# Patient Record
Sex: FEMALE | Race: WHITE | Employment: OTHER | URBAN - METROPOLITAN AREA
[De-identification: names, ages, dates, MRNs, and addresses within clinical notes are randomized per-mention and may not be internally consistent; named-entity substitution may affect disease eponyms.]

---

## 2017-03-13 ENCOUNTER — TELEPHONE (OUTPATIENT)
Dept: FAMILY MEDICINE CLINIC | Age: 62
End: 2017-03-13

## 2017-06-22 ENCOUNTER — OFFICE VISIT (OUTPATIENT)
Dept: FAMILY MEDICINE CLINIC | Age: 62
End: 2017-06-22

## 2017-06-22 VITALS
WEIGHT: 233 LBS | TEMPERATURE: 97.9 F | HEART RATE: 60 BPM | DIASTOLIC BLOOD PRESSURE: 86 MMHG | RESPIRATION RATE: 16 BRPM | BODY MASS INDEX: 42.62 KG/M2 | SYSTOLIC BLOOD PRESSURE: 142 MMHG

## 2017-06-22 DIAGNOSIS — Z87.81 S/P ORIF (OPEN REDUCTION INTERNAL FIXATION) FRACTURE: ICD-10-CM

## 2017-06-22 DIAGNOSIS — Z98.890 S/P ORIF (OPEN REDUCTION INTERNAL FIXATION) FRACTURE: ICD-10-CM

## 2017-06-22 DIAGNOSIS — M53.3 SACROILIAC JOINT PAIN: Primary | ICD-10-CM

## 2017-06-22 DIAGNOSIS — Z72.0 TOBACCO ABUSE: ICD-10-CM

## 2017-06-22 DIAGNOSIS — I10 ESSENTIAL HYPERTENSION: ICD-10-CM

## 2017-06-22 DIAGNOSIS — E66.01 MORBID (SEVERE) OBESITY DUE TO EXCESS CALORIES (HCC): ICD-10-CM

## 2017-06-22 PROCEDURE — 99214 OFFICE O/P EST MOD 30 MIN: CPT | Performed by: NURSE PRACTITIONER

## 2017-06-22 RX ORDER — NAPROXEN 250 MG/1
250 TABLET ORAL 2 TIMES DAILY WITH MEALS
Qty: 60 TABLET | Refills: 0 | Status: SHIPPED | OUTPATIENT
Start: 2017-06-22 | End: 2017-08-15 | Stop reason: SDUPTHER

## 2017-06-22 ASSESSMENT — ENCOUNTER SYMPTOMS
BACK PAIN: 1
ABDOMINAL PAIN: 0
COUGH: 1
APNEA: 0
ABDOMINAL DISTENTION: 0
TROUBLE SWALLOWING: 0
NAUSEA: 0
BLOOD IN STOOL: 0
ANAL BLEEDING: 0
SHORTNESS OF BREATH: 0
CONSTIPATION: 1
VOICE CHANGE: 0
DIARRHEA: 0
VOMITING: 0
WHEEZING: 1
PHOTOPHOBIA: 0

## 2017-08-15 DIAGNOSIS — J20.9 ACUTE BRONCHITIS, UNSPECIFIED ORGANISM: ICD-10-CM

## 2017-08-15 DIAGNOSIS — M53.3 SACROILIAC JOINT PAIN: ICD-10-CM

## 2017-08-15 RX ORDER — NAPROXEN 250 MG/1
250 TABLET ORAL 2 TIMES DAILY PRN
Qty: 60 TABLET | Refills: 0 | Status: SHIPPED | OUTPATIENT
Start: 2017-08-15 | End: 2017-10-17 | Stop reason: SDUPTHER

## 2017-08-16 RX ORDER — ALBUTEROL SULFATE 90 UG/1
1 AEROSOL, METERED RESPIRATORY (INHALATION) EVERY 6 HOURS PRN
Qty: 1 INHALER | Refills: 2 | Status: SHIPPED | OUTPATIENT
Start: 2017-08-16 | End: 2018-01-16 | Stop reason: SDUPTHER

## 2017-08-28 ENCOUNTER — HOSPITAL ENCOUNTER (OUTPATIENT)
Dept: WOMENS IMAGING | Age: 62
Discharge: HOME | End: 2017-08-28
Payer: COMMERCIAL

## 2017-08-28 DIAGNOSIS — Z12.31 VISIT FOR SCREENING MAMMOGRAM: ICD-10-CM

## 2017-08-28 PROCEDURE — G0202 SCR MAMMO BI INCL CAD: HCPCS

## 2017-10-17 ENCOUNTER — OFFICE VISIT (OUTPATIENT)
Dept: FAMILY MEDICINE CLINIC | Age: 62
End: 2017-10-17
Payer: COMMERCIAL

## 2017-10-17 VITALS
WEIGHT: 241 LBS | RESPIRATION RATE: 16 BRPM | HEIGHT: 62 IN | BODY MASS INDEX: 44.35 KG/M2 | DIASTOLIC BLOOD PRESSURE: 78 MMHG | HEART RATE: 60 BPM | TEMPERATURE: 98.1 F | SYSTOLIC BLOOD PRESSURE: 120 MMHG

## 2017-10-17 DIAGNOSIS — Z00.00 ANNUAL PHYSICAL EXAM: Primary | ICD-10-CM

## 2017-10-17 DIAGNOSIS — H61.21 EXCESSIVE CERUMEN IN EAR CANAL, RIGHT: ICD-10-CM

## 2017-10-17 DIAGNOSIS — M17.0 PRIMARY OSTEOARTHRITIS OF BOTH KNEES: ICD-10-CM

## 2017-10-17 DIAGNOSIS — Z72.0 TOBACCO ABUSE: ICD-10-CM

## 2017-10-17 DIAGNOSIS — R63.5 WEIGHT GAIN: ICD-10-CM

## 2017-10-17 PROCEDURE — 99396 PREV VISIT EST AGE 40-64: CPT | Performed by: FAMILY MEDICINE

## 2017-10-17 RX ORDER — NAPROXEN 250 MG/1
250 TABLET ORAL 2 TIMES DAILY PRN
Qty: 60 TABLET | Refills: 1 | Status: SHIPPED | OUTPATIENT
Start: 2017-10-17 | End: 2018-07-11 | Stop reason: SDUPTHER

## 2017-10-17 RX ORDER — NAPROXEN 250 MG/1
250 TABLET ORAL 2 TIMES DAILY PRN
COMMUNITY
End: 2017-10-17 | Stop reason: SDUPTHER

## 2017-10-17 ASSESSMENT — PATIENT HEALTH QUESTIONNAIRE - PHQ9
SUM OF ALL RESPONSES TO PHQ QUESTIONS 1-9: 1
1. LITTLE INTEREST OR PLEASURE IN DOING THINGS: 0
2. FEELING DOWN, DEPRESSED OR HOPELESS: 1
SUM OF ALL RESPONSES TO PHQ9 QUESTIONS 1 & 2: 1

## 2017-10-18 ENCOUNTER — HOSPITAL ENCOUNTER (OUTPATIENT)
Age: 62
Discharge: HOME | End: 2017-10-18
Payer: COMMERCIAL

## 2017-10-18 DIAGNOSIS — R63.5 WEIGHT GAIN: ICD-10-CM

## 2017-10-18 DIAGNOSIS — Z00.00 ANNUAL PHYSICAL EXAM: ICD-10-CM

## 2017-10-18 LAB
ALBUMIN SERPL-MCNC: 3.9 G/DL (ref 3.5–5.1)
ALP BLD-CCNC: 95 U/L (ref 38–126)
ALT SERPL-CCNC: 6 U/L (ref 11–66)
ANION GAP SERPL CALCULATED.3IONS-SCNC: 11 MEQ/L (ref 8–16)
AST SERPL-CCNC: 16 U/L (ref 5–40)
BILIRUB SERPL-MCNC: 0.3 MG/DL (ref 0.3–1.2)
BUN BLDV-MCNC: 11 MG/DL (ref 7–22)
CALCIUM SERPL-MCNC: 8.8 MG/DL (ref 8.5–10.5)
CHLORIDE BLD-SCNC: 103 MEQ/L (ref 98–111)
CHOLESTEROL, TOTAL: 149 MG/DL (ref 100–199)
CO2: 31 MEQ/L (ref 23–33)
CREAT SERPL-MCNC: 0.5 MG/DL (ref 0.4–1.2)
GFR SERPL CREATININE-BSD FRML MDRD: > 90 ML/MIN/1.73M2
GLUCOSE BLD-MCNC: 93 MG/DL (ref 70–108)
HDLC SERPL-MCNC: 49 MG/DL
LDL CHOLESTEROL CALCULATED: 79 MG/DL
POTASSIUM SERPL-SCNC: 4.7 MEQ/L (ref 3.5–5.2)
SODIUM BLD-SCNC: 145 MEQ/L (ref 135–145)
TOTAL PROTEIN: 7 G/DL (ref 6.1–8)
TRIGL SERPL-MCNC: 106 MG/DL (ref 0–199)
TSH SERPL DL<=0.05 MIU/L-ACNC: 1.77 UIU/ML (ref 0.4–4.2)

## 2017-10-18 PROCEDURE — 80061 LIPID PANEL: CPT

## 2017-10-18 PROCEDURE — 84443 ASSAY THYROID STIM HORMONE: CPT

## 2017-10-18 PROCEDURE — 80053 COMPREHEN METABOLIC PANEL: CPT

## 2017-10-18 PROCEDURE — 36415 COLL VENOUS BLD VENIPUNCTURE: CPT

## 2017-10-18 ASSESSMENT — ENCOUNTER SYMPTOMS
SHORTNESS OF BREATH: 0
ABDOMINAL PAIN: 0
NAUSEA: 0
VOMITING: 0
EYES NEGATIVE: 1
DIARRHEA: 0
BLOOD IN STOOL: 0

## 2017-10-18 NOTE — PROGRESS NOTES
Chief Complaint   Patient presents with   Christopher Wilhelm Check-Up     Patient requesting general check up. Requesting lab orders.  Medication Refill     Order pended. Bhavesh Benjamin is a 57 y. o.female    Pt presents for annual wellness physical exam.        Pt stable since last visit- no new problems for diagnoses listed below:  Patient Active Problem List   Diagnosis    Tobacco abuse    Class 3 obesity due to excess calories without serious comorbidity with body mass index (BMI) of 40.0 to 44.9 in adult [facility])     No complaints today except for some right ear discomfort with decreased hearing. Due for labs and screenings. Still smoking. Has cut back to 1/2 ppd. Not exercising due to knee issues and arthritis. No changes in family history. Declines flu shot. Will be due for colonoscopy in the spring. Body mass index is 44.08 kg/m². Review of Systems   Constitutional: Positive for unexpected weight change (gain). Negative for chills, fatigue and fever. HENT: Negative. Eyes: Negative. Respiratory: Negative for shortness of breath. Cardiovascular: Negative for chest pain, palpitations and leg swelling. Gastrointestinal: Negative for abdominal pain, blood in stool, diarrhea, nausea and vomiting. Genitourinary: Negative. Musculoskeletal: Positive for arthralgias. Negative for myalgias. Skin: Negative for rash. Neurological: Negative for dizziness and headaches. Hematological: Negative. Psychiatric/Behavioral: Negative. All other systems reviewed and are negative.           OBJECTIVE     /78 (Site: Left Arm, Position: Sitting, Cuff Size: Large Adult)   Pulse 60   Temp 98.1 °F (36.7 °C) (Oral)   Resp 16   Ht 5' 2\" (1.575 m)   Wt 241 lb (109.3 kg)   BMI 44.08 kg/m²     Wt Readings from Last 3 Encounters:   10/17/17 241 lb (109.3 kg)   06/22/17 233 lb (105.7 kg)   06/29/16 232 lb (105.2 kg)     BP Readings from Last 3 Encounters:   10/17/17 120/78

## 2018-01-15 ENCOUNTER — TELEPHONE (OUTPATIENT)
Dept: FAMILY MEDICINE CLINIC | Age: 63
End: 2018-01-15

## 2018-01-16 ENCOUNTER — OFFICE VISIT (OUTPATIENT)
Dept: FAMILY MEDICINE CLINIC | Age: 63
End: 2018-01-16
Payer: COMMERCIAL

## 2018-01-16 VITALS
RESPIRATION RATE: 20 BRPM | WEIGHT: 247.2 LBS | BODY MASS INDEX: 45.21 KG/M2 | SYSTOLIC BLOOD PRESSURE: 132 MMHG | HEART RATE: 64 BPM | DIASTOLIC BLOOD PRESSURE: 84 MMHG | TEMPERATURE: 97.8 F

## 2018-01-16 DIAGNOSIS — J01.91 ACUTE RECURRENT SINUSITIS, UNSPECIFIED LOCATION: Primary | ICD-10-CM

## 2018-01-16 PROCEDURE — 99213 OFFICE O/P EST LOW 20 MIN: CPT | Performed by: FAMILY MEDICINE

## 2018-01-16 RX ORDER — CEFUROXIME AXETIL 250 MG/1
250 TABLET ORAL 2 TIMES DAILY
Qty: 20 TABLET | Refills: 0 | Status: SHIPPED | OUTPATIENT
Start: 2018-01-16 | End: 2018-01-26

## 2018-01-16 RX ORDER — ALBUTEROL SULFATE 90 UG/1
1 AEROSOL, METERED RESPIRATORY (INHALATION) EVERY 6 HOURS PRN
Qty: 1 INHALER | Refills: 2 | Status: SHIPPED | OUTPATIENT
Start: 2018-01-16 | End: 2018-05-18

## 2018-01-16 ASSESSMENT — ENCOUNTER SYMPTOMS
NAUSEA: 0
SORE THROAT: 1
RHINORRHEA: 1
VOMITING: 0
SINUS PRESSURE: 1
SINUS PAIN: 1
COUGH: 1

## 2018-01-16 NOTE — PROGRESS NOTES
Chief Complaint   Patient presents with    Sinusitis     Sinus pressure, productive cough, and congestion for the past few weeks but pressure worsening over the past few days. Not using anything OTC for symptoms. Yumiko Mahoney is a 57 y. o.female      Pt complains of a 2 week h/o sinus pressure, cough and congestion. Symptoms worse over the last 3-4 days. Not using any OTC meds. Smoker of 1/2 ppd. Declines smoking cessation aids. No fever or body aches. Denies wheezing. Using her albuterol inhaler at night. Review of Systems   Constitutional: Positive for fatigue. Negative for fever. HENT: Positive for congestion, postnasal drip, rhinorrhea, sinus pain, sinus pressure and sore throat. Negative for ear pain. Respiratory: Positive for cough. Cardiovascular: Negative. Gastrointestinal: Negative for nausea and vomiting. Musculoskeletal: Negative for myalgias. Neurological: Negative for headaches. All other systems reviewed and are negative. OBJECTIVE     /84 (Site: Left Arm, Position: Sitting, Cuff Size: Large Adult)   Pulse 64   Temp 97.8 °F (36.6 °C) (Oral)   Resp 20   Wt 247 lb 3.2 oz (112.1 kg)   BMI 45.21 kg/m²     Physical Exam   Constitutional: She appears well-developed and well-nourished. HENT:   Right Ear: Tympanic membrane normal.   Left Ear: Tympanic membrane normal.   Nose: Mucosal edema present. Right sinus exhibits maxillary sinus tenderness and frontal sinus tenderness. Left sinus exhibits maxillary sinus tenderness and frontal sinus tenderness. Mouth/Throat: Oropharynx is clear and moist.   Cardiovascular: Normal rate and regular rhythm. No murmur heard. Pulmonary/Chest: Breath sounds normal. She has no wheezes. Musculoskeletal: She exhibits no edema. Lymphadenopathy:     She has no cervical adenopathy. ASSESSMENT      1.  Acute recurrent sinusitis, unspecified location        PLAN     Requested Prescriptions Signed Prescriptions Disp Refills    albuterol sulfate HFA (PROAIR HFA) 108 (90 Base) MCG/ACT inhaler 1 Inhaler 2     Sig: Inhale 1 puff into the lungs every 6 hours as needed for Wheezing or Shortness of Breath    cefUROXime (CEFTIN) 250 MG tablet 20 tablet 0     Sig: Take 1 tablet by mouth 2 times daily for 10 days       Trial of nasal saline rinses for 24-48 hours. If not improved, start ceftin as above for sinuses.     Refill of albuterol given to use prn wheezing      Follow up if not better            Electronically signed by Alcira Richardson MD on 1/16/2018 at 4:48 PM

## 2018-02-02 ENCOUNTER — OFFICE VISIT (OUTPATIENT)
Dept: FAMILY MEDICINE CLINIC | Age: 63
End: 2018-02-02
Payer: COMMERCIAL

## 2018-02-02 VITALS
BODY MASS INDEX: 44.77 KG/M2 | SYSTOLIC BLOOD PRESSURE: 116 MMHG | TEMPERATURE: 97.8 F | OXYGEN SATURATION: 91 % | WEIGHT: 243.3 LBS | HEIGHT: 62 IN | RESPIRATION RATE: 22 BRPM | HEART RATE: 69 BPM | DIASTOLIC BLOOD PRESSURE: 70 MMHG

## 2018-02-02 DIAGNOSIS — J20.9 ACUTE BRONCHITIS DUE TO INFECTION: Primary | ICD-10-CM

## 2018-02-02 DIAGNOSIS — R05.9 COUGH: ICD-10-CM

## 2018-02-02 DIAGNOSIS — R06.02 SOB (SHORTNESS OF BREATH): ICD-10-CM

## 2018-02-02 PROCEDURE — 96372 THER/PROPH/DIAG INJ SC/IM: CPT | Performed by: NURSE PRACTITIONER

## 2018-02-02 PROCEDURE — 99213 OFFICE O/P EST LOW 20 MIN: CPT | Performed by: NURSE PRACTITIONER

## 2018-02-02 RX ORDER — ALBUTEROL SULFATE 2.5 MG/3ML
2.5 SOLUTION RESPIRATORY (INHALATION) ONCE
Status: COMPLETED | OUTPATIENT
Start: 2018-02-02 | End: 2018-02-02

## 2018-02-02 RX ORDER — AZITHROMYCIN 250 MG/1
TABLET, FILM COATED ORAL
Qty: 6 TABLET | Refills: 0 | Status: SHIPPED | OUTPATIENT
Start: 2018-02-02 | End: 2018-05-18

## 2018-02-02 RX ORDER — PROMETHAZINE HYDROCHLORIDE AND CODEINE PHOSPHATE 6.25; 1 MG/5ML; MG/5ML
5 SYRUP ORAL 4 TIMES DAILY PRN
Qty: 120 ML | Refills: 0 | Status: SHIPPED | OUTPATIENT
Start: 2018-02-02 | End: 2018-02-09

## 2018-02-02 RX ORDER — PREDNISONE 20 MG/1
20 TABLET ORAL 2 TIMES DAILY
Qty: 10 TABLET | Refills: 0 | Status: SHIPPED | OUTPATIENT
Start: 2018-02-02 | End: 2018-02-07

## 2018-02-02 RX ORDER — METHYLPREDNISOLONE ACETATE 80 MG/ML
80 INJECTION, SUSPENSION INTRA-ARTICULAR; INTRALESIONAL; INTRAMUSCULAR; SOFT TISSUE ONCE
Status: COMPLETED | OUTPATIENT
Start: 2018-02-02 | End: 2018-02-02

## 2018-02-02 RX ADMIN — METHYLPREDNISOLONE ACETATE 80 MG: 80 INJECTION, SUSPENSION INTRA-ARTICULAR; INTRALESIONAL; INTRAMUSCULAR; SOFT TISSUE at 09:57

## 2018-02-02 RX ADMIN — ALBUTEROL SULFATE 2.5 MG: 2.5 SOLUTION RESPIRATORY (INHALATION) at 09:56

## 2018-02-02 ASSESSMENT — ENCOUNTER SYMPTOMS
EYE ITCHING: 0
VOMITING: 0
ABDOMINAL PAIN: 0
EYE DISCHARGE: 0
CONSTIPATION: 0
RHINORRHEA: 1
WHEEZING: 1
CHEST TIGHTNESS: 1
SHORTNESS OF BREATH: 1
EYE REDNESS: 0
DIARRHEA: 0
SINUS PRESSURE: 1
EYE PAIN: 0
NAUSEA: 0
BACK PAIN: 0
COUGH: 1
TROUBLE SWALLOWING: 0
SORE THROAT: 0

## 2018-02-02 NOTE — PATIENT INSTRUCTIONS
You may receive a survey about your visit with us today. The feedback from our patients helps us identify what is working well and where the service to all patients can be enhanced. Thank you! Patient Education        Bronchitis: Care Instructions  Your Care Instructions    Bronchitis is inflammation of the bronchial tubes, which carry air to the lungs. The tubes swell and produce mucus, or phlegm. The mucus and inflamed bronchial tubes make you cough. You may have trouble breathing. Most cases of bronchitis are caused by viruses like those that cause colds. Antibiotics usually do not help and they may be harmful. Bronchitis usually develops rapidly and lasts about 2 to 3 weeks in otherwise healthy people. Follow-up care is a key part of your treatment and safety. Be sure to make and go to all appointments, and call your doctor if you are having problems. It's also a good idea to know your test results and keep a list of the medicines you take. How can you care for yourself at home? · Take all medicines exactly as prescribed. Call your doctor if you think you are having a problem with your medicine. · Get some extra rest.  · Take an over-the-counter pain medicine, such as acetaminophen (Tylenol), ibuprofen (Advil, Motrin), or naproxen (Aleve) to reduce fever and relieve body aches. Read and follow all instructions on the label. · Do not take two or more pain medicines at the same time unless the doctor told you to. Many pain medicines have acetaminophen, which is Tylenol. Too much acetaminophen (Tylenol) can be harmful. · Take an over-the-counter cough medicine that contains dextromethorphan to help quiet a dry, hacking cough so that you can sleep. Avoid cough medicines that have more than one active ingredient. Read and follow all instructions on the label. · Breathe moist air from a humidifier, hot shower, or sink filled with hot water.  The heat and moisture will thin mucus so you can cough it out.  · Do not smoke. Smoking can make bronchitis worse. If you need help quitting, talk to your doctor about stop-smoking programs and medicines. These can increase your chances of quitting for good. When should you call for help? Call 911 anytime you think you may need emergency care. For example, call if:  ? · You have severe trouble breathing. ?Call your doctor now or seek immediate medical care if:  ? · You have new or worse trouble breathing. ? · You cough up dark brown or bloody mucus (sputum). ? · You have a new or higher fever. ? · You have a new rash. ? Watch closely for changes in your health, and be sure to contact your doctor if:  ? · You cough more deeply or more often, especially if you notice more mucus or a change in the color of your mucus. ? · You are not getting better as expected. Where can you learn more? Go to [email or web address].[email or web address] org and sign in to your Find[employer]Best account. Enter H333 in the Carlypso box to learn more about \"Bronchitis: Care Instructions. \"     If you do not have an account, please click on the \"Sign Up Now\" link. Current as of: May 12, 2017  Content Version: 11.5  © 4860-6845 [employer], [employer]orporated. Care instructions adapted under license by [facility] ([facility]). If you have questions about a medical condition or this instruction, always ask your healthcare professional. [place] [address] any warranty or liability for your use of this information.

## 2018-02-05 ENCOUNTER — OFFICE VISIT (OUTPATIENT)
Dept: FAMILY MEDICINE CLINIC | Age: 63
End: 2018-02-05
Payer: COMMERCIAL

## 2018-02-05 ENCOUNTER — HOSPITAL ENCOUNTER (EMERGENCY)
Age: 63
Discharge: HOME | End: 2018-02-05
Attending: FAMILY MEDICINE
Payer: COMMERCIAL

## 2018-02-05 ENCOUNTER — TELEPHONE (OUTPATIENT)
Dept: FAMILY MEDICINE CLINIC | Age: 63
End: 2018-02-05

## 2018-02-05 ENCOUNTER — APPOINTMENT (OUTPATIENT)
Dept: GENERAL RADIOLOGY | Age: 63
End: 2018-02-05
Payer: COMMERCIAL

## 2018-02-05 VITALS
SYSTOLIC BLOOD PRESSURE: 138 MMHG | RESPIRATION RATE: 24 BRPM | OXYGEN SATURATION: 85 % | DIASTOLIC BLOOD PRESSURE: 88 MMHG | TEMPERATURE: 97.3 F | BODY MASS INDEX: 44.72 KG/M2 | HEIGHT: 62 IN | WEIGHT: 243 LBS | HEART RATE: 68 BPM

## 2018-02-05 VITALS
BODY MASS INDEX: 43.05 KG/M2 | OXYGEN SATURATION: 93 % | HEART RATE: 73 BPM | SYSTOLIC BLOOD PRESSURE: 150 MMHG | RESPIRATION RATE: 17 BRPM | HEIGHT: 63 IN | TEMPERATURE: 97.8 F | WEIGHT: 243 LBS | DIASTOLIC BLOOD PRESSURE: 68 MMHG

## 2018-02-05 DIAGNOSIS — J44.1 COPD EXACERBATION (HCC): Primary | ICD-10-CM

## 2018-02-05 DIAGNOSIS — R06.02 SHORTNESS OF BREATH: Primary | ICD-10-CM

## 2018-02-05 DIAGNOSIS — J44.1 COPD EXACERBATION (HCC): ICD-10-CM

## 2018-02-05 LAB
ALBUMIN SERPL-MCNC: 3.9 G/DL (ref 3.5–5.1)
ALP BLD-CCNC: 81 U/L (ref 38–126)
ALT SERPL-CCNC: 10 U/L (ref 11–66)
ANION GAP SERPL CALCULATED.3IONS-SCNC: 14 MEQ/L (ref 8–16)
ANISOCYTOSIS: ABNORMAL
APTT: 29.7 SECONDS (ref 22–38)
AST SERPL-CCNC: 26 U/L (ref 5–40)
BASOPHILS # BLD: 0.3 %
BASOPHILS ABSOLUTE: 0 THOU/MM3 (ref 0–0.1)
BILIRUB SERPL-MCNC: 0.3 MG/DL (ref 0.3–1.2)
BUN BLDV-MCNC: 15 MG/DL (ref 7–22)
CALCIUM SERPL-MCNC: 9.2 MG/DL (ref 8.5–10.5)
CHLORIDE BLD-SCNC: 97 MEQ/L (ref 98–111)
CO2: 32 MEQ/L (ref 23–33)
CREAT SERPL-MCNC: 0.6 MG/DL (ref 0.4–1.2)
D-DIMER QUANTITATIVE: 450 NG/ML FEU (ref 0–500)
EKG ATRIAL RATE: 67 BPM
EKG P AXIS: 70 DEGREES
EKG P-R INTERVAL: 110 MS
EKG Q-T INTERVAL: 414 MS
EKG QRS DURATION: 78 MS
EKG QTC CALCULATION (BAZETT): 437 MS
EKG R AXIS: -6 DEGREES
EKG T AXIS: -28 DEGREES
EKG VENTRICULAR RATE: 67 BPM
EOSINOPHIL # BLD: 0.1 %
EOSINOPHILS ABSOLUTE: 0 THOU/MM3 (ref 0–0.4)
FLU A ANTIGEN: NEGATIVE
FLU B ANTIGEN: NEGATIVE
GFR SERPL CREATININE-BSD FRML MDRD: > 90 ML/MIN/1.73M2
GLUCOSE BLD-MCNC: 139 MG/DL (ref 70–108)
HCT VFR BLD CALC: 44.8 % (ref 37–47)
HEMOGLOBIN: 14.2 GM/DL (ref 12–16)
INR BLD: 0.97 (ref 0.85–1.13)
LACTIC ACID: 1.9 MMOL/L (ref 0.5–2.2)
LIPASE: 15.4 U/L (ref 5.6–51.3)
LYMPHOCYTES # BLD: 12.2 %
LYMPHOCYTES ABSOLUTE: 1 THOU/MM3 (ref 1–4.8)
MCH RBC QN AUTO: 28.4 PG (ref 27–31)
MCHC RBC AUTO-ENTMCNC: 31.6 GM/DL (ref 33–37)
MCV RBC AUTO: 89.8 FL (ref 81–99)
MONOCYTES # BLD: 7.4 %
MONOCYTES ABSOLUTE: 0.6 THOU/MM3 (ref 0.4–1.3)
NUCLEATED RED BLOOD CELLS: 0 /100 WBC
OSMOLALITY CALCULATION: 288.1 MOSMOL/KG (ref 275–300)
PDW BLD-RTO: 15.2 % (ref 11.5–14.5)
PLATELET # BLD: 263 THOU/MM3 (ref 130–400)
PMV BLD AUTO: 8.6 FL (ref 7.4–10.4)
POTASSIUM REFLEX MAGNESIUM: 4.5 MEQ/L (ref 3.5–5.2)
PRO-BNP: 310.7 PG/ML (ref 0–900)
RBC # BLD: 4.99 MILL/MM3 (ref 4.2–5.4)
SEG NEUTROPHILS: 80 %
SEGMENTED NEUTROPHILS ABSOLUTE COUNT: 6.6 THOU/MM3 (ref 1.8–7.7)
SODIUM BLD-SCNC: 143 MEQ/L (ref 135–145)
TOTAL PROTEIN: 7.4 G/DL (ref 6.1–8)
TROPONIN T: < 0.01 NG/ML
WBC # BLD: 8.2 THOU/MM3 (ref 4.8–10.8)

## 2018-02-05 PROCEDURE — 96375 TX/PRO/DX INJ NEW DRUG ADDON: CPT

## 2018-02-05 PROCEDURE — 99212 OFFICE O/P EST SF 10 MIN: CPT | Performed by: NURSE PRACTITIONER

## 2018-02-05 PROCEDURE — 36415 COLL VENOUS BLD VENIPUNCTURE: CPT

## 2018-02-05 PROCEDURE — 85379 FIBRIN DEGRADATION QUANT: CPT

## 2018-02-05 PROCEDURE — 99285 EMERGENCY DEPT VISIT HI MDM: CPT

## 2018-02-05 PROCEDURE — 6360000002 HC RX W HCPCS: Performed by: FAMILY MEDICINE

## 2018-02-05 PROCEDURE — 85730 THROMBOPLASTIN TIME PARTIAL: CPT

## 2018-02-05 PROCEDURE — 87040 BLOOD CULTURE FOR BACTERIA: CPT

## 2018-02-05 PROCEDURE — 85610 PROTHROMBIN TIME: CPT

## 2018-02-05 PROCEDURE — 80053 COMPREHEN METABOLIC PANEL: CPT

## 2018-02-05 PROCEDURE — 93005 ELECTROCARDIOGRAM TRACING: CPT

## 2018-02-05 PROCEDURE — 83690 ASSAY OF LIPASE: CPT

## 2018-02-05 PROCEDURE — 71046 X-RAY EXAM CHEST 2 VIEWS: CPT

## 2018-02-05 PROCEDURE — 84484 ASSAY OF TROPONIN QUANT: CPT

## 2018-02-05 PROCEDURE — 6370000000 HC RX 637 (ALT 250 FOR IP): Performed by: FAMILY MEDICINE

## 2018-02-05 PROCEDURE — 83880 ASSAY OF NATRIURETIC PEPTIDE: CPT

## 2018-02-05 PROCEDURE — 85025 COMPLETE CBC W/AUTO DIFF WBC: CPT

## 2018-02-05 PROCEDURE — 94640 AIRWAY INHALATION TREATMENT: CPT

## 2018-02-05 PROCEDURE — 96365 THER/PROPH/DIAG IV INF INIT: CPT

## 2018-02-05 PROCEDURE — 87804 INFLUENZA ASSAY W/OPTIC: CPT

## 2018-02-05 PROCEDURE — 83605 ASSAY OF LACTIC ACID: CPT

## 2018-02-05 RX ORDER — ALBUTEROL SULFATE 2.5 MG/3ML
5 SOLUTION RESPIRATORY (INHALATION) ONCE
Status: COMPLETED | OUTPATIENT
Start: 2018-02-05 | End: 2018-02-05

## 2018-02-05 RX ORDER — LEVOFLOXACIN 5 MG/ML
750 INJECTION, SOLUTION INTRAVENOUS ONCE
Status: COMPLETED | OUTPATIENT
Start: 2018-02-05 | End: 2018-02-05

## 2018-02-05 RX ORDER — ALBUTEROL SULFATE 90 UG/1
2 AEROSOL, METERED RESPIRATORY (INHALATION) EVERY 4 HOURS PRN
Qty: 1 INHALER | Refills: 0 | Status: SHIPPED | OUTPATIENT
Start: 2018-02-05 | End: 2019-02-05 | Stop reason: SDUPTHER

## 2018-02-05 RX ORDER — METHYLPREDNISOLONE SODIUM SUCCINATE 125 MG/2ML
125 INJECTION, POWDER, LYOPHILIZED, FOR SOLUTION INTRAMUSCULAR; INTRAVENOUS ONCE
Status: COMPLETED | OUTPATIENT
Start: 2018-02-05 | End: 2018-02-05

## 2018-02-05 RX ORDER — PREDNISONE 20 MG/1
60 TABLET ORAL DAILY
Qty: 15 TABLET | Refills: 0 | Status: SHIPPED | OUTPATIENT
Start: 2018-02-05 | End: 2018-02-10

## 2018-02-05 RX ORDER — IPRATROPIUM BROMIDE AND ALBUTEROL SULFATE 2.5; .5 MG/3ML; MG/3ML
2 SOLUTION RESPIRATORY (INHALATION) ONCE
Status: COMPLETED | OUTPATIENT
Start: 2018-02-05 | End: 2018-02-05

## 2018-02-05 RX ORDER — LEVOFLOXACIN 500 MG/1
500 TABLET, FILM COATED ORAL DAILY
Qty: 10 TABLET | Refills: 0 | Status: SHIPPED | OUTPATIENT
Start: 2018-02-05 | End: 2018-02-15

## 2018-02-05 RX ADMIN — IPRATROPIUM BROMIDE AND ALBUTEROL SULFATE 2 AMPULE: .5; 3 SOLUTION RESPIRATORY (INHALATION) at 18:23

## 2018-02-05 RX ADMIN — LEVOFLOXACIN 750 MG: 5 INJECTION, SOLUTION INTRAVENOUS at 18:17

## 2018-02-05 RX ADMIN — ALBUTEROL SULFATE 5 MG: 2.5 SOLUTION RESPIRATORY (INHALATION) at 18:33

## 2018-02-05 RX ADMIN — METHYLPREDNISOLONE SODIUM SUCCINATE 125 MG: 125 INJECTION, POWDER, FOR SOLUTION INTRAMUSCULAR; INTRAVENOUS at 18:16

## 2018-02-05 ASSESSMENT — ENCOUNTER SYMPTOMS
SHORTNESS OF BREATH: 1
CONSTIPATION: 0
SHORTNESS OF BREATH: 1
BACK PAIN: 0
NAUSEA: 0
VOMITING: 0
CHEST TIGHTNESS: 1
ABDOMINAL PAIN: 0
EYE ITCHING: 0
EYE PAIN: 0
BACK PAIN: 0
TROUBLE SWALLOWING: 0
CHEST TIGHTNESS: 1
NAUSEA: 0
RHINORRHEA: 0
RHINORRHEA: 0
WHEEZING: 1
EYE DISCHARGE: 0
WHEEZING: 1
EYE DISCHARGE: 0
SORE THROAT: 0
EYE PAIN: 0
SINUS PRESSURE: 0
DIARRHEA: 0
ABDOMINAL PAIN: 1
DIARRHEA: 0
VOMITING: 0
EYE REDNESS: 0
SORE THROAT: 0
COUGH: 1
COUGH: 1

## 2018-02-05 ASSESSMENT — PAIN DESCRIPTION - DESCRIPTORS: DESCRIPTORS: DISCOMFORT

## 2018-02-05 ASSESSMENT — PAIN DESCRIPTION - PAIN TYPE: TYPE: ACUTE PAIN

## 2018-02-05 ASSESSMENT — PAIN SCALES - GENERAL: PAINLEVEL_OUTOF10: 3

## 2018-02-05 ASSESSMENT — PAIN DESCRIPTION - ORIENTATION: ORIENTATION: LEFT;MID;UPPER

## 2018-02-05 ASSESSMENT — PAIN DESCRIPTION - LOCATION: LOCATION: ABDOMEN

## 2018-02-05 ASSESSMENT — PAIN DESCRIPTION - FREQUENCY: FREQUENCY: INTERMITTENT

## 2018-02-05 NOTE — PROGRESS NOTES
[address] [facility]  [facility]  [address] [address]  Dept: [phone]  Dept Fax: [phone]  Loc: [phone]      Celsa Hughes is a 57 y.o. female who presents today for Shortness of Breath (here today for trouble breathing- seen JE 02/02/2018 for same issues- did not wake up last evening to do inhalers- thinks this is issue. Cannot cough this up. did take cough syrup last evening)      HPI:     URI    This is a recurrent problem. The current episode started in the past 7 days (Symptoms started last week, worsening SOB). The problem has been gradually worsening. There has been no fever. Associated symptoms include congestion, coughing and wheezing. Pertinent negatives include no abdominal pain, chest pain, diarrhea, dysuria, ear pain, headaches, nausea, rhinorrhea, sore throat or vomiting. The patient is allergic to flonase [fluticasone propionate]; lisinopril; and mucinex d [pseudoephedrine-guaifenesin er]. Past Medical History  Zoey  has a past medical history of Anxiety; Chronic back pain; Depression; Diverticulosis; Obesity; Osteoarthritis; Tobacco abuse; and Tobacco abuse. Medications    Current Outpatient Prescriptions:     albuterol-ipratropium (COMBIVENT RESPIMAT)  MCG/ACT AERS inhaler, Inhale 1 puff into the lungs every 6 hours as needed for Wheezing, Disp: 1 Inhaler, Rfl: 0    azithromycin (ZITHROMAX Z-ANDRÉS) 250 MG tablet, 2 tablets day 1 then1 tablet days 2 - 5., Disp: 6 tablet, Rfl: 0    predniSONE (DELTASONE) 20 MG tablet, Take 1 tablet by mouth 2 times daily for 5 days, Disp: 10 tablet, Rfl: 0    promethazine-codeine (PHENERGAN WITH CODEINE) 6.25-10 MG/5ML syrup, Take 5 mLs by mouth 4 times daily as needed for Cough for up to 7 days. , Disp: 120 mL, Rfl: 0    albuterol sulfate HFA (PROAIR HFA) 108 (90 Base) MCG/ACT inhaler, Inhale 1 puff into the lungs every 6 hours as needed for Wheezing or Shortness of Breath, Disp: 1 Inhaler, Rfl: 2    naproxen (NAPROSYN) 250 MG tablet, Take 1 tablet by mouth 2 times daily as needed for Pain, Disp: 60 tablet, Rfl: 1    Calcium Citrate-Vitamin D (CALCIUM + D PO), Take by mouth daily, Disp: , Rfl:     UNABLE TO FIND, Occuvite 1 QD, Disp: , Rfl:     fexofenadine (ALLEGRA) 180 MG tablet, Take 180 mg by mouth daily. , Disp: , Rfl:     Subjective:      Review of Systems   Constitutional: Negative for activity change, appetite change, chills, fatigue and fever. HENT: Positive for congestion. Negative for ear discharge, ear pain, hearing loss, postnasal drip, rhinorrhea, sinus pressure, sore throat and trouble swallowing. Eyes: Negative for pain, discharge, redness and itching. Respiratory: Positive for cough, chest tightness, shortness of breath and wheezing. Cardiovascular: Negative for chest pain, palpitations and leg swelling. Gastrointestinal: Negative for abdominal pain, constipation, diarrhea, nausea and vomiting. Endocrine: Negative. Genitourinary: Negative for dysuria, flank pain, frequency and hematuria. Musculoskeletal: Negative for arthralgias, back pain, joint swelling and myalgias. Skin: Negative. Neurological: Negative for dizziness, light-headedness and headaches. Hematological: Negative. Objective:     Vitals:    02/05/18 1542   BP: 138/88   Site: Left Arm   Position: Sitting   Pulse: 68   Resp: 24   Temp: 97.3 °F (36.3 °C)   TempSrc: Oral   SpO2: (!) 85%   Weight: 243 lb (110.2 kg)   Height: 5' 2\" (1.575 m)     Physical Exam   Constitutional: She is oriented to person, place, and time. She appears well-developed and well-nourished. No distress. HENT:   Head: Normocephalic and atraumatic. Right Ear: External ear normal.   Left Ear: External ear normal.   Nose: Nose normal.   Mouth/Throat: Oropharynx is clear and moist. No oropharyngeal exudate. Cardiovascular: Normal rate, regular rhythm and normal heart sounds.   Exam reveals no gallop and no friction rub.    No murmur heard. Pulmonary/Chest: Effort normal. No respiratory distress. She has no wheezes. She has no rales. She exhibits no tenderness. DBS throughout with an occasional exp wheeze. PO 85% RA. Pursed lip breathing and orthopneic on exam.    Neurological: She is alert and oriented to person, place, and time. Skin: Skin is warm and dry. Psychiatric: She has a normal mood and affect. Her behavior is normal. Judgment normal.   Nursing note and vitals reviewed. Assessment/Plan:    Patient evaluated in office on Friday. No improvement noted in condition. Patient continues to smoke. She called office today for a Qvar or Combivent inhaler and Tramadol. Combivent was called into [facility]'s, patient states that she used old Combivent inhaler from another provider. Patient was told that there was no more that I could do that would help her outpatient. The next course of treatment would be to go to ER for further testing. Patient refusing EMS transport to ER, she wishes to drive herself despite her orthopnea and SOB on exertion. She will be met be a friend who will then help her into the ER. I did discuss with the patient risks to self and others if she chooses to drive self. León De La Rosa was seen today for shortness of breath. Diagnoses and all orders for this visit:    Shortness of breath    COPD exacerbation ([address]        Return if symptoms worsen or fail to improve. Patient instructions given and reviewed.      Electronically signed by Patricia Spencer CNP on 2/5/2018 at 4:56 PM

## 2018-02-05 NOTE — TELEPHONE ENCOUNTER
She will need referral to another Pulmonologist after she feels better for PFTs etc. I will give her the Combivent inhaler, [facility] on [address]. She can DC use of Albuterol,  She can use Tylenol for the pain, she was also given Phenergan/Codeine cough syrup.  Mayi iVdales

## 2018-02-05 NOTE — ED PROVIDER NOTES
hemotympanum. Mouth/Throat: Uvula is midline, oropharynx is clear and moist and mucous membranes are normal. Mucous membranes are not pale, not dry and not cyanotic. No oropharyngeal exudate, posterior oropharyngeal edema, posterior oropharyngeal erythema or tonsillar abscesses. Eyes: Conjunctivae are normal. Right eye exhibits no discharge. Left eye exhibits no discharge. No scleral icterus. Neck: Normal range of motion. Neck supple. No JVD present. Cardiovascular: Normal rate, regular rhythm and normal heart sounds. Exam reveals no gallop and no friction rub. No murmur heard. Pulmonary/Chest: Effort normal. No accessory muscle usage. No respiratory distress. She has no decreased breath sounds. She has wheezes (diffuse). She has no rhonchi. She has no rales. Abdominal: Soft. She exhibits no distension. There is no tenderness. There is no rebound and no guarding. Musculoskeletal: Normal range of motion. She exhibits no edema. Neurological: She is alert and oriented to person, place, and time. She exhibits normal muscle tone. She displays no seizure activity. GCS eye subscore is 4. GCS verbal subscore is 5. GCS motor subscore is 6. Skin: Skin is warm and dry. No rash noted. She is not diaphoretic. Psychiatric: She has a normal mood and affect. Her behavior is normal. Thought content normal.   Nursing note and vitals reviewed. DIFFERENTIAL DIAGNOSIS:   Differentials include, but are not limited to COPD exacerbation, pneumonia, and URI. DIAGNOSTIC RESULTS     EKG: All EKG's are interpreted by the Emergency Department Physician who either signs or Co-signs this chart in the absence of a cardiologist.  EKG interpreted by Rogers Abbott MD:    RADIOLOGY: non-plain film images(s) such as CT, Ultrasound and MRI are read by the radiologist.    XR CHEST STANDARD (2 VW)   Final Result   1. Slight hyperinflation is noted. No other acute cardiac pulmonary findings.             **This report has been created using voice recognition software. It may contain minor errors which are inherent in voice recognition technology. **      Final report electronically signed by Dr. Smith Friday on 2/5/2018 6:07 PM          LABS:   Labs Reviewed   CBC WITH AUTO DIFFERENTIAL - Abnormal; Notable for the following:        Result Value    MCHC 31.6 (*)     RDW 15.2 (*)     All other components within normal limits   COMPREHENSIVE METABOLIC PANEL W/ REFLEX TO MG FOR LOW K - Abnormal; Notable for the following:     Glucose 139 (*)     Chloride 97 (*)     ALT 10 (*)     All other components within normal limits   RAPID INFLUENZA A/B ANTIGENS   CULTURE BLOOD #1   CULTURE BLOOD #2   APTT   TROPONIN   BRAIN NATRIURETIC PEPTIDE   LIPASE   LACTIC ACID, PLASMA   PROTIME-INR   D-DIMER, QUANTITATIVE   ANION GAP   GLOMERULAR FILTRATION RATE, ESTIMATED   OSMOLALITY       EMERGENCY DEPARTMENT COURSE:   Vitals:    Vitals:    02/05/18 1658 02/05/18 1819 02/05/18 1916 02/05/18 1920   BP: (!) 193/112 (!) 201/101 (!) 170/105 (!) 150/68   Pulse: 71 61 73    Resp: 28 19 17    Temp: 97.8 °F (36.6 °C)      TempSrc: Oral      SpO2: 90% 95% 93%    Weight: 243 lb (110.2 kg)      Height: 5' 2.5\" (1.588 m)          5:35 PM: The patient was seen and evaluated. Appropriate laboratory and radiology tests were ordered. At bedside, the patient was treated with Levaquin, solu-medrol, duoneb, and Proventil. MDM:  The patient was seen and evaluated for cough. On exam, the patient had diffuse wheezing. Appropriate laboratory and radiology tests were ordered. At bedside, the patient was treated with Levaquin, solu-medrol, duoneb, and Proventil. Patient's workup is negative. X-ray does not reveal any pneumonia. X-ray reveals hyperinflation consistent with COPD. Patient's lab work is satisfactory. 750pm patient is offered  an admission for COPD exacerbation but she declines. She would like to try outpatient treatment.   Blood pressure is slightly elevated

## 2018-02-06 NOTE — ED NOTES
In to assess pt. Pt sitting up on side of bed talking to family member at bedside. Pt denies pain. Respirations easy. Call light in reach. Will monitor.      Dawit Lester RN  02/05/18 1429

## 2018-02-11 LAB
BLOOD CULTURE, ROUTINE: NORMAL
BLOOD CULTURE, ROUTINE: NORMAL

## 2018-05-16 ENCOUNTER — TELEPHONE (OUTPATIENT)
Dept: FAMILY MEDICINE CLINIC | Age: 63
End: 2018-05-16

## 2018-05-18 ENCOUNTER — OFFICE VISIT (OUTPATIENT)
Dept: FAMILY MEDICINE CLINIC | Age: 63
End: 2018-05-18
Payer: COMMERCIAL

## 2018-05-18 VITALS
SYSTOLIC BLOOD PRESSURE: 126 MMHG | HEIGHT: 62 IN | HEART RATE: 60 BPM | DIASTOLIC BLOOD PRESSURE: 80 MMHG | TEMPERATURE: 97.5 F | RESPIRATION RATE: 16 BRPM | BODY MASS INDEX: 44.16 KG/M2 | WEIGHT: 240 LBS

## 2018-05-18 DIAGNOSIS — R19.7 DIARRHEA, UNSPECIFIED TYPE: Primary | ICD-10-CM

## 2018-05-18 DIAGNOSIS — R03.0 TRANSIENT ELEVATED BLOOD PRESSURE: ICD-10-CM

## 2018-05-18 PROCEDURE — 99213 OFFICE O/P EST LOW 20 MIN: CPT | Performed by: FAMILY MEDICINE

## 2018-05-18 RX ORDER — LISINOPRIL 5 MG/1
5 TABLET ORAL DAILY
Qty: 30 TABLET | Refills: 0 | Status: SHIPPED | OUTPATIENT
Start: 2018-05-18 | End: 2019-06-20 | Stop reason: SDUPTHER

## 2018-05-20 ASSESSMENT — ENCOUNTER SYMPTOMS
ABDOMINAL DISTENTION: 1
VOMITING: 0
RESPIRATORY NEGATIVE: 1
BLOOD IN STOOL: 0
NAUSEA: 0
DIARRHEA: 1

## 2018-07-11 DIAGNOSIS — M17.0 PRIMARY OSTEOARTHRITIS OF BOTH KNEES: ICD-10-CM

## 2018-07-11 RX ORDER — NAPROXEN 250 MG/1
TABLET ORAL
Qty: 60 TABLET | Refills: 0 | Status: SHIPPED | OUTPATIENT
Start: 2018-07-11 | End: 2018-11-02 | Stop reason: SDUPTHER

## 2018-09-05 ENCOUNTER — HOSPITAL ENCOUNTER (OUTPATIENT)
Dept: WOMENS IMAGING | Age: 63
Discharge: HOME | End: 2018-09-05
Payer: MEDICARE

## 2018-09-05 DIAGNOSIS — Z12.31 VISIT FOR SCREENING MAMMOGRAM: ICD-10-CM

## 2018-09-05 PROCEDURE — 77067 SCR MAMMO BI INCL CAD: CPT

## 2018-09-10 ENCOUNTER — TELEPHONE (OUTPATIENT)
Dept: FAMILY MEDICINE CLINIC | Age: 63
End: 2018-09-10

## 2018-09-25 ENCOUNTER — CARE COORDINATION (OUTPATIENT)
Dept: CARE COORDINATION | Age: 63
End: 2018-09-25

## 2018-09-26 ENCOUNTER — HOSPITAL ENCOUNTER (OUTPATIENT)
Age: 63
Discharge: HOME | End: 2018-09-26
Payer: MEDICARE

## 2018-09-26 ENCOUNTER — HOSPITAL ENCOUNTER (OUTPATIENT)
Dept: GENERAL RADIOLOGY | Age: 63
Discharge: HOME | End: 2018-09-26
Payer: MEDICARE

## 2018-09-26 ENCOUNTER — OFFICE VISIT (OUTPATIENT)
Dept: FAMILY MEDICINE CLINIC | Age: 63
End: 2018-09-26
Payer: MEDICARE

## 2018-09-26 VITALS
RESPIRATION RATE: 18 BRPM | WEIGHT: 241.6 LBS | OXYGEN SATURATION: 92 % | BODY MASS INDEX: 44.19 KG/M2 | DIASTOLIC BLOOD PRESSURE: 80 MMHG | TEMPERATURE: 97.6 F | HEART RATE: 60 BPM | SYSTOLIC BLOOD PRESSURE: 126 MMHG

## 2018-09-26 DIAGNOSIS — J44.1 COPD EXACERBATION (HCC): ICD-10-CM

## 2018-09-26 DIAGNOSIS — J01.90 ACUTE BACTERIAL SINUSITIS: Primary | ICD-10-CM

## 2018-09-26 DIAGNOSIS — B96.89 ACUTE BACTERIAL SINUSITIS: Primary | ICD-10-CM

## 2018-09-26 DIAGNOSIS — Z72.0 TOBACCO ABUSE: ICD-10-CM

## 2018-09-26 PROCEDURE — 99213 OFFICE O/P EST LOW 20 MIN: CPT | Performed by: NURSE PRACTITIONER

## 2018-09-26 PROCEDURE — 71046 X-RAY EXAM CHEST 2 VIEWS: CPT

## 2018-09-26 RX ORDER — CEFDINIR 300 MG/1
300 CAPSULE ORAL 2 TIMES DAILY
Qty: 20 CAPSULE | Refills: 0 | Status: SHIPPED | OUTPATIENT
Start: 2018-09-26 | End: 2018-10-06

## 2018-09-26 RX ORDER — AZITHROMYCIN 250 MG/1
TABLET, FILM COATED ORAL
Qty: 1 PACKET | Refills: 0 | Status: CANCELLED | OUTPATIENT
Start: 2018-09-26 | End: 2018-09-30

## 2018-09-26 ASSESSMENT — PATIENT HEALTH QUESTIONNAIRE - PHQ9
SUM OF ALL RESPONSES TO PHQ9 QUESTIONS 1 & 2: 0
1. LITTLE INTEREST OR PLEASURE IN DOING THINGS: 0
SUM OF ALL RESPONSES TO PHQ QUESTIONS 1-9: 0
SUM OF ALL RESPONSES TO PHQ QUESTIONS 1-9: 0
2. FEELING DOWN, DEPRESSED OR HOPELESS: 0

## 2018-09-26 ASSESSMENT — ENCOUNTER SYMPTOMS
SORE THROAT: 0
SINUS PRESSURE: 1
SINUS COMPLAINT: 1
HOARSE VOICE: 1
TROUBLE SWALLOWING: 0
COUGH: 1
RHINORRHEA: 1
SHORTNESS OF BREATH: 1

## 2018-09-26 NOTE — PROGRESS NOTES
[facility]  [address] [address]  Dept: [phone]  Dept Fax: [phone]     Visit Date:  9/26/2018      Patient:  Leida Bradford  YOB: 1955    HPI:     Chief Complaint   Patient presents with    Sinusitis     allergies and sinus issues for several months, congestion mostly at night, in the morning very congested, head ache, congested cough, SOB    Mass     left hand sometimes painful, x 1 month or so        Sinus Problem   This is a new problem. Episode onset: 3 months. The problem has been waxing and waning since onset. There has been no fever. Associated symptoms include congestion, coughing, headaches, a hoarse voice, neck pain, shortness of breath (at times with activity), sinus pressure and sneezing. Pertinent negatives include no chills or sore throat. Past treatments include acetaminophen and oral decongestants. The treatment provided mild relief. Medications    Current Outpatient Prescriptions:     cefdinir (OMNICEF) 300 MG capsule, Take 1 capsule by mouth 2 times daily for 10 days, Disp: 20 capsule, Rfl: 0    albuterol sulfate HFA (PROVENTIL HFA) 108 (90 Base) MCG/ACT inhaler, Inhale 2 puffs into the lungs every 4 hours as needed for Wheezing or Shortness of Breath (Space out to every 6 hours as symptoms improve) Space out to every 6 hours as symptoms improve., Disp: 1 Inhaler, Rfl: 0    Calcium Citrate-Vitamin D (CALCIUM + D PO), Take by mouth daily, Disp: , Rfl:     UNABLE TO FIND, Occuvite 1 QD, Disp: , Rfl:     fexofenadine (ALLEGRA) 180 MG tablet, Take 180 mg by mouth daily. , Disp: , Rfl:     naproxen (NAPROSYN) 250 MG tablet, TAKE 1 TABLET BY MOUTH TWICE DAILY AS NEEDED FOR PAIN, Disp: 60 tablet, Rfl: 0    lisinopril (PRINIVIL;ZESTRIL) 5 MG tablet, Take 1 tablet by mouth daily, Disp: 30 tablet, Rfl: 0    albuterol-ipratropium (COMBIVENT RESPIMAT)  MCG/ACT AERS inhaler, Inhale 1 puff into the lungs every 6 hours as needed for Wheezing, Disp: 1 Inhaler, Rfl: 0    The patient is allergic to flonase [fluticasone propionate] and mucinex d [pseudoephedrine-guaifenesin er]. Past Medical History  Zoey  has a past medical history of Anxiety; Chronic back pain; Diverticulosis; Obesity; Osteoarthritis; Tobacco abuse; and Tobacco abuse. Subjective:      Review of Systems   Constitutional: Positive for fatigue. Negative for chills and fever. HENT: Positive for congestion, hoarse voice, postnasal drip, rhinorrhea, sinus pressure and sneezing. Negative for sore throat and trouble swallowing. Respiratory: Positive for cough and shortness of breath (at times with activity). Cardiovascular: Negative for chest pain and palpitations. Musculoskeletal: Positive for neck pain. Neurological: Positive for headaches. Objective:     /80   Pulse 60   Temp 97.6 °F (36.4 °C) (Oral)   Resp 18   Wt 241 lb 9.6 oz (109.6 kg)   SpO2 92%   BMI 44.19 kg/m²     Physical Exam   Constitutional: She is oriented to person, place, and time. She appears well-developed and well-nourished. No distress. HENT:   Head: Normocephalic and atraumatic. Right Ear: Hearing, tympanic membrane, external ear and ear canal normal.   Left Ear: Hearing, tympanic membrane, external ear and ear canal normal.   Nose: Mucosal edema present. Right sinus exhibits frontal sinus tenderness. Right sinus exhibits no maxillary sinus tenderness. Left sinus exhibits frontal sinus tenderness. Left sinus exhibits no maxillary sinus tenderness. Mouth/Throat: Uvula is midline, oropharynx is clear and moist and mucous membranes are normal. No oropharyngeal exudate. Neck: Normal range of motion. Neck supple. No tracheal deviation present. Cardiovascular: Normal rate, regular rhythm and normal heart sounds. Pulmonary/Chest: Effort normal. No respiratory distress. She has wheezes (expiratory). Abdominal: Soft. Bowel sounds are normal. There is no tenderness. Neurological: She is alert and oriented to person, place, and time. Coordination normal.   Skin: Skin is warm and dry. Psychiatric: She has a normal mood and affect. Her behavior is normal. Judgment and thought content normal.   Vitals reviewed. Assessment/Plan:      Zoey was seen today for sinusitis and mass. Diagnoses and all orders for this visit:    Acute bacterial sinusitis  -     cefdinir (OMNICEF) 300 MG capsule; Take 1 capsule by mouth 2 times daily for 10 days    COPD exacerbation (HCC)  -     XR CHEST STANDARD (2 VW); Future    Tobacco abuse  -     XR CHEST STANDARD (2 VW); Future    Class 3 obesity due to excess calories without serious comorbidity with body mass index (BMI) of 40.0 to 44.9 in [facility])    - rest and increase fluids  - tylenol as needed for pain  - May switch from allegra to Claritin  - Chest x-ray today, will call with results  - Call office with any questions or concerns, or if symptoms are getting worse or changing      Return if symptoms worsen or fail to improve. Patient instructions given and reviewed.         Electronically signed by GAETANO Harry CNP on 9/26/2018 at 1:40 PM

## 2018-09-26 NOTE — PATIENT INSTRUCTIONS
hot, wet towel or a warm gel pack on your face 3 or 4 times a day for 5 to 10 minutes each time. · Try a decongestant nasal spray like oxymetazoline (Afrin). Do not use it for more than 3 days in a row. Using it for more than 3 days can make your congestion worse. When should you call for help? Call your doctor now or seek immediate medical care if:    · You have new or worse swelling or redness in your face or around your eyes.     · You have a new or higher fever.    Watch closely for changes in your health, and be sure to contact your doctor if:    · You have new or worse facial pain.     · The mucus from your nose becomes thicker (like pus) or has new blood in it.     · You are not getting better as expected. Where can you learn more? Go to https://Zhui Xinmaceb.RoosterBi. org and sign in to your Otelic account. Enter H036 in the [employer] box to learn more about \"Sinusitis: Care Instructions. \"     If you do not have an account, please click on the \"Sign Up Now\" link. Current as of: May 12, 2017  Content Version: 11.7  © 7036-7679 [employer]. Care instructions adapted under license by [facility] ([facility]). If you have questions about a medical condition or this instruction, always ask your healthcare professional. [place] [address] any warranty or liability for your use of this information. Patient Education        Stopping Smoking: Care Instructions  Your Care Instructions  Cigarette smokers crave the nicotine in cigarettes. Giving it up is much harder than simply changing a habit. Your body has to stop craving the nicotine. It is hard to quit, but you can do it. There are many tools that people use to quit smoking. You may find that combining tools works best for you. There are several steps to quitting. First you get ready to quit. Then you get support to help you. After that, you learn new skills and behaviors to become a nonsmoker.  For many people, a of: November 29, 2017  Content Version: 11.7  © 5978-4902 [employer]. Care instructions adapted under license by [facility] ([facility]). If you have questions about a medical condition or this instruction, always ask your healthcare professional. [address] any warranty or liability for your use of this information.

## 2018-09-28 ENCOUNTER — TELEPHONE (OUTPATIENT)
Dept: FAMILY MEDICINE CLINIC | Age: 63
End: 2018-09-28

## 2018-09-28 DIAGNOSIS — R93.89 ABNORMAL RADIOLOGIC DENSITY: Primary | ICD-10-CM

## 2018-09-28 NOTE — TELEPHONE ENCOUNTER
----- Message from GAETANO Sanders CNP sent at 9/26/2018 12:39 PM EDT -----  Please call pt and let her know that the x-ray did not show any pneumonia or other acute process. The radiologist would like to have another view (apical lordotic view of the chest) done to look at a dark spot on the right upper chest, it could be a shadow or vessel, but he wants to rule out that a more significant lung nodule.   Thanks- WS

## 2018-10-23 ENCOUNTER — HOSPITAL ENCOUNTER (OUTPATIENT)
Dept: GENERAL RADIOLOGY | Age: 63
Discharge: HOME | End: 2018-10-23
Payer: MEDICARE

## 2018-10-23 ENCOUNTER — HOSPITAL ENCOUNTER (OUTPATIENT)
Age: 63
Discharge: HOME | End: 2018-10-23
Payer: MEDICARE

## 2018-10-23 DIAGNOSIS — R93.89 ABNORMAL RADIOLOGIC DENSITY: ICD-10-CM

## 2018-10-23 PROCEDURE — 71045 X-RAY EXAM CHEST 1 VIEW: CPT

## 2018-11-02 DIAGNOSIS — M17.0 PRIMARY OSTEOARTHRITIS OF BOTH KNEES: ICD-10-CM

## 2018-11-05 RX ORDER — NAPROXEN 250 MG/1
TABLET ORAL
Qty: 60 TABLET | Refills: 0 | Status: SHIPPED | OUTPATIENT
Start: 2018-11-05 | End: 2019-02-05 | Stop reason: SDUPTHER

## 2018-11-26 ENCOUNTER — TELEPHONE (OUTPATIENT)
Dept: FAMILY MEDICINE CLINIC | Age: 63
End: 2018-11-26

## 2018-11-29 ENCOUNTER — NURSE ONLY (OUTPATIENT)
Dept: FAMILY MEDICINE CLINIC | Age: 63
End: 2018-11-29
Payer: MEDICARE

## 2018-11-29 DIAGNOSIS — Z23 NEED FOR VACCINATION WITH 13-POLYVALENT PNEUMOCOCCAL CONJUGATE VACCINE: Primary | ICD-10-CM

## 2018-11-29 PROCEDURE — G0009 ADMIN PNEUMOCOCCAL VACCINE: HCPCS | Performed by: FAMILY MEDICINE

## 2018-11-29 PROCEDURE — 90670 PCV13 VACCINE IM: CPT | Performed by: FAMILY MEDICINE

## 2018-12-04 ENCOUNTER — CARE COORDINATION (OUTPATIENT)
Dept: CARE COORDINATION | Age: 63
End: 2018-12-04

## 2019-02-05 DIAGNOSIS — M17.0 PRIMARY OSTEOARTHRITIS OF BOTH KNEES: ICD-10-CM

## 2019-02-05 RX ORDER — NAPROXEN 250 MG/1
TABLET ORAL
Qty: 60 TABLET | Refills: 0 | Status: SHIPPED | OUTPATIENT
Start: 2019-02-05 | End: 2019-05-14 | Stop reason: SDUPTHER

## 2019-02-18 ENCOUNTER — TELEPHONE (OUTPATIENT)
Dept: FAMILY MEDICINE CLINIC | Age: 64
End: 2019-02-18

## 2019-02-19 ENCOUNTER — OFFICE VISIT (OUTPATIENT)
Dept: FAMILY MEDICINE CLINIC | Age: 64
End: 2019-02-19
Payer: MEDICARE

## 2019-02-19 VITALS
WEIGHT: 242.38 LBS | DIASTOLIC BLOOD PRESSURE: 78 MMHG | TEMPERATURE: 97.7 F | RESPIRATION RATE: 20 BRPM | HEART RATE: 68 BPM | SYSTOLIC BLOOD PRESSURE: 146 MMHG | BODY MASS INDEX: 44.33 KG/M2

## 2019-02-19 DIAGNOSIS — M54.40 BACK PAIN OF LUMBOSACRAL REGION WITH SCIATICA: ICD-10-CM

## 2019-02-19 DIAGNOSIS — M54.31 SCIATICA OF RIGHT SIDE: Primary | ICD-10-CM

## 2019-02-19 PROCEDURE — 99213 OFFICE O/P EST LOW 20 MIN: CPT | Performed by: NURSE PRACTITIONER

## 2019-02-19 RX ORDER — TRAMADOL HYDROCHLORIDE 50 MG/1
50 TABLET ORAL EVERY 8 HOURS PRN
Qty: 9 TABLET | Refills: 0 | Status: SHIPPED | OUTPATIENT
Start: 2019-02-19 | End: 2019-02-22

## 2019-02-19 RX ORDER — PREDNISONE 10 MG/1
TABLET ORAL
Qty: 30 TABLET | Refills: 0 | Status: SHIPPED | OUTPATIENT
Start: 2019-02-19 | End: 2019-03-01

## 2019-02-19 RX ORDER — BACLOFEN 10 MG/1
10 TABLET ORAL 2 TIMES DAILY
Qty: 20 TABLET | Refills: 0 | Status: SHIPPED | OUTPATIENT
Start: 2019-02-19 | End: 2019-03-01

## 2019-02-19 ASSESSMENT — ENCOUNTER SYMPTOMS
COLOR CHANGE: 0
BACK PAIN: 1
ABDOMINAL PAIN: 0
DIARRHEA: 0
NAUSEA: 0
CONSTIPATION: 1

## 2019-02-22 DIAGNOSIS — M54.16 LUMBAR BACK PAIN WITH RADICULOPATHY AFFECTING RIGHT LOWER EXTREMITY: Primary | ICD-10-CM

## 2019-02-22 RX ORDER — HYDROCODONE BITARTRATE AND ACETAMINOPHEN 5; 325 MG/1; MG/1
1 TABLET ORAL 2 TIMES DAILY PRN
Qty: 6 TABLET | Refills: 0 | Status: SHIPPED | OUTPATIENT
Start: 2019-02-22 | End: 2019-02-25

## 2019-03-20 ENCOUNTER — TELEPHONE (OUTPATIENT)
Dept: FAMILY MEDICINE CLINIC | Age: 64
End: 2019-03-20

## 2019-03-22 ENCOUNTER — OFFICE VISIT (OUTPATIENT)
Dept: FAMILY MEDICINE CLINIC | Age: 64
End: 2019-03-22
Payer: MEDICARE

## 2019-03-22 VITALS
WEIGHT: 247 LBS | RESPIRATION RATE: 17 BRPM | OXYGEN SATURATION: 95 % | DIASTOLIC BLOOD PRESSURE: 90 MMHG | SYSTOLIC BLOOD PRESSURE: 160 MMHG | HEART RATE: 68 BPM | HEIGHT: 62 IN | TEMPERATURE: 97.4 F | BODY MASS INDEX: 45.45 KG/M2

## 2019-03-22 DIAGNOSIS — J20.9 BRONCHITIS, ACUTE, WITH BRONCHOSPASM: Primary | ICD-10-CM

## 2019-03-22 PROCEDURE — 99213 OFFICE O/P EST LOW 20 MIN: CPT | Performed by: NURSE PRACTITIONER

## 2019-03-22 RX ORDER — METHYLPREDNISOLONE 4 MG/1
TABLET ORAL
Qty: 1 KIT | Refills: 0 | Status: SHIPPED | OUTPATIENT
Start: 2019-03-22 | End: 2019-03-28

## 2019-03-22 RX ORDER — CEFUROXIME AXETIL 250 MG/1
250 TABLET ORAL 2 TIMES DAILY
Qty: 20 TABLET | Refills: 0 | Status: SHIPPED | OUTPATIENT
Start: 2019-03-22 | End: 2019-04-01

## 2019-03-22 ASSESSMENT — ENCOUNTER SYMPTOMS
EYE DISCHARGE: 0
HEMOPTYSIS: 0
COUGH: 1
SHORTNESS OF BREATH: 1
SORE THROAT: 1
WHEEZING: 1
HEARTBURN: 0
RHINORRHEA: 1
EYE REDNESS: 0
EYE PAIN: 1
TROUBLE SWALLOWING: 0

## 2019-03-22 ASSESSMENT — PATIENT HEALTH QUESTIONNAIRE - PHQ9
2. FEELING DOWN, DEPRESSED OR HOPELESS: 0
SUM OF ALL RESPONSES TO PHQ QUESTIONS 1-9: 0
1. LITTLE INTEREST OR PLEASURE IN DOING THINGS: 0
SUM OF ALL RESPONSES TO PHQ9 QUESTIONS 1 & 2: 0
SUM OF ALL RESPONSES TO PHQ QUESTIONS 1-9: 0

## 2019-05-14 DIAGNOSIS — M17.0 PRIMARY OSTEOARTHRITIS OF BOTH KNEES: ICD-10-CM

## 2019-05-15 RX ORDER — NAPROXEN 250 MG/1
TABLET ORAL
Qty: 60 TABLET | Refills: 0 | Status: SHIPPED | OUTPATIENT
Start: 2019-05-15 | End: 2019-06-14 | Stop reason: SDUPTHER

## 2019-05-15 NOTE — TELEPHONE ENCOUNTER
Can we call pt and remind her that she needs to schedule a follow up appointment for routine visits? Does she even need the naprosyn anymore?   Thanks -WS

## 2019-05-15 NOTE — TELEPHONE ENCOUNTER
EP request received from [facility] for refill on naprosyn  Last seen 3/22/19 WS.  No future appt  Last rx 2/5/19 #60/0

## 2019-06-10 ENCOUNTER — TELEPHONE (OUTPATIENT)
Dept: FAMILY MEDICINE CLINIC | Age: 64
End: 2019-06-10

## 2019-06-10 NOTE — TELEPHONE ENCOUNTER
Pt called and is not feeling well and would like to have a wellness and a office visit back to back. She knows that no acute issues can be talked about in her wellness but she doesn't want to come on 2 different days she wants them to be back to back and I couldn't find anything until July and she wants it as soon as possible.  Please call pt with appt

## 2019-06-10 NOTE — TELEPHONE ENCOUNTER
I am assuming that it is a Medicare Wellness that patient is referring to. Hasn't had a f/u for chronic conditions in our office for several months. Ok to schedule as Medicare Wellness/6 month check in the next week or two? I can see if patient willing to see WS or TS also.

## 2019-06-14 ENCOUNTER — OFFICE VISIT (OUTPATIENT)
Dept: FAMILY MEDICINE CLINIC | Age: 64
End: 2019-06-14
Payer: MEDICARE

## 2019-06-14 VITALS
SYSTOLIC BLOOD PRESSURE: 118 MMHG | HEIGHT: 62 IN | OXYGEN SATURATION: 93 % | HEART RATE: 68 BPM | WEIGHT: 242 LBS | DIASTOLIC BLOOD PRESSURE: 74 MMHG | RESPIRATION RATE: 20 BRPM | BODY MASS INDEX: 44.53 KG/M2 | TEMPERATURE: 97.6 F

## 2019-06-14 DIAGNOSIS — Z00.00 ROUTINE GENERAL MEDICAL EXAMINATION AT A HEALTH CARE FACILITY: ICD-10-CM

## 2019-06-14 DIAGNOSIS — Z13.220 SCREENING FOR HYPERLIPIDEMIA: ICD-10-CM

## 2019-06-14 DIAGNOSIS — J20.9 BRONCHITIS, ACUTE, WITH BRONCHOSPASM: ICD-10-CM

## 2019-06-14 DIAGNOSIS — Z12.12 SCREENING FOR COLORECTAL CANCER: ICD-10-CM

## 2019-06-14 DIAGNOSIS — Z12.11 SCREENING FOR COLORECTAL CANCER: ICD-10-CM

## 2019-06-14 DIAGNOSIS — J44.1 COPD EXACERBATION (HCC): Primary | ICD-10-CM

## 2019-06-14 DIAGNOSIS — R53.83 FATIGUE, UNSPECIFIED TYPE: ICD-10-CM

## 2019-06-14 DIAGNOSIS — Z87.891 PERSONAL HISTORY OF TOBACCO USE, PRESENTING HAZARDS TO HEALTH: ICD-10-CM

## 2019-06-14 DIAGNOSIS — Z13.1 SCREENING FOR DIABETES MELLITUS (DM): ICD-10-CM

## 2019-06-14 DIAGNOSIS — M17.0 PRIMARY OSTEOARTHRITIS OF BOTH KNEES: ICD-10-CM

## 2019-06-14 PROCEDURE — 99406 BEHAV CHNG SMOKING 3-10 MIN: CPT | Performed by: NURSE PRACTITIONER

## 2019-06-14 PROCEDURE — 99213 OFFICE O/P EST LOW 20 MIN: CPT | Performed by: NURSE PRACTITIONER

## 2019-06-14 PROCEDURE — G0438 PPPS, INITIAL VISIT: HCPCS | Performed by: NURSE PRACTITIONER

## 2019-06-14 RX ORDER — NAPROXEN 250 MG/1
TABLET ORAL
Qty: 60 TABLET | Refills: 3 | Status: SHIPPED | OUTPATIENT
Start: 2019-06-14 | End: 2020-10-21 | Stop reason: SDUPTHER

## 2019-06-14 RX ORDER — AZITHROMYCIN 250 MG/1
250 TABLET, FILM COATED ORAL SEE ADMIN INSTRUCTIONS
Qty: 6 TABLET | Refills: 0 | Status: SHIPPED | OUTPATIENT
Start: 2019-06-14 | End: 2019-06-19

## 2019-06-14 RX ORDER — PREDNISONE 10 MG/1
TABLET ORAL
Qty: 30 TABLET | Refills: 0 | Status: SHIPPED | OUTPATIENT
Start: 2019-06-14 | End: 2019-06-24

## 2019-06-14 RX ORDER — ALBUTEROL SULFATE 90 UG/1
AEROSOL, METERED RESPIRATORY (INHALATION)
Qty: 1 INHALER | Refills: 3 | Status: SHIPPED | OUTPATIENT
Start: 2019-06-14 | End: 2019-10-25 | Stop reason: SDUPTHER

## 2019-06-14 ASSESSMENT — ENCOUNTER SYMPTOMS
BACK PAIN: 1
SHORTNESS OF BREATH: 1
DIARRHEA: 0
COUGH: 1
VOMITING: 0
CONSTIPATION: 0
WHEEZING: 1
RHINORRHEA: 1
BLOOD IN STOOL: 0
NAUSEA: 0

## 2019-06-14 ASSESSMENT — LIFESTYLE VARIABLES
HAS A RELATIVE, FRIEND, DOCTOR, OR ANOTHER HEALTH PROFESSIONAL EXPRESSED CONCERN ABOUT YOUR DRINKING OR SUGGESTED YOU CUT DOWN: 0
HAVE YOU OR SOMEONE ELSE BEEN INJURED AS A RESULT OF YOUR DRINKING: 0
HOW MANY STANDARD DRINKS CONTAINING ALCOHOL DO YOU HAVE ON A TYPICAL DAY: 1
HOW OFTEN DO YOU HAVE A DRINK CONTAINING ALCOHOL: 1
HOW OFTEN DURING THE LAST YEAR HAVE YOU FAILED TO DO WHAT WAS NORMALLY EXPECTED FROM YOU BECAUSE OF DRINKING: 0
HOW OFTEN DURING THE LAST YEAR HAVE YOU NEEDED AN ALCOHOLIC DRINK FIRST THING IN THE MORNING TO GET YOURSELF GOING AFTER A NIGHT OF HEAVY DRINKING: 0
HOW OFTEN DURING THE LAST YEAR HAVE YOU FOUND THAT YOU WERE NOT ABLE TO STOP DRINKING ONCE YOU HAD STARTED: 1
HOW OFTEN DURING THE LAST YEAR HAVE YOU HAD A FEELING OF GUILT OR REMORSE AFTER DRINKING: 0
HOW OFTEN DURING THE LAST YEAR HAVE YOU BEEN UNABLE TO REMEMBER WHAT HAPPENED THE NIGHT BEFORE BECAUSE YOU HAD BEEN DRINKING: 0

## 2019-06-14 ASSESSMENT — ANXIETY QUESTIONNAIRES: GAD7 TOTAL SCORE: 0

## 2019-06-14 ASSESSMENT — PATIENT HEALTH QUESTIONNAIRE - PHQ9
SUM OF ALL RESPONSES TO PHQ QUESTIONS 1-9: 1
SUM OF ALL RESPONSES TO PHQ QUESTIONS 1-9: 1

## 2019-06-14 NOTE — PATIENT INSTRUCTIONS
Patient Education        Learning About Benefits From Quitting Smoking  How does quitting smoking make you healthier? If you're thinking about quitting smoking, you may have a few reasons to be smoke-free. Your health may be one of them. · When you quit smoking, you lower your risks for cancer, lung disease, heart attack, stroke, blood vessel disease, and blindness from macular degeneration. · When you're smoke-free, you get sick less often, and you heal faster. You are less likely to get colds, flu, bronchitis, and pneumonia. · As a nonsmoker, you may find that your mood is better and you are less stressed. When and how will you feel healthier? Quitting has real health benefits that start from day 1 of being smoke-free. And the longer you stay smoke-free, the healthier you get and the better you feel. The first hours  · After just 20 minutes, your blood pressure and heart rate go down. That means there's less stress on your heart and blood vessels. · Within 12 hours, the level of carbon monoxide in your blood drops back to normal. That makes room for more oxygen. With more oxygen in your body, you may notice that you have more energy than when you smoked. After 2 weeks  · Your lungs start to work better. · Your risk of heart attack starts to drop. After 1 month  · When your lungs are clear, you cough less and breathe deeper, so it's easier to be active. · Your sense of taste and smell return. That means you can enjoy food more than you have since you started smoking. Over the years  · After 1 year, your risk of heart disease is half what it would be if you kept smoking. · After 5 years, your risk of stroke starts to shrink. Within a few years after that, it's about the same as if you'd never smoked. · After 10 years, your risk of dying from lung cancer is cut by about half. And your risk for many other types of cancer is lower too. How would quitting help others in your life?   When you quit smoking, you improve the health of everyone who now breathes in your smoke. · Their heart, lung, and cancer risks drop, much like yours. · They are sick less. For babies and small children, living smoke-free means they're less likely to have ear infections, pneumonia, and bronchitis. · If you're a woman who is or will be pregnant someday, quitting smoking means a healthier . · Children who are close to you are less likely to become adult smokers. Where can you learn more? Go to https://Thetis PharmaceuticalspechanelIn*Situ Architecture.Calixar. org and sign in to your Factor.io account. Enter [id number] in the [employer] box to learn more about \"Learning About Benefits From Quitting Smoking. \"     If you do not have an account, please click on the \"Sign Up Now\" link. Current as of: 2018  Content Version: 12.0  © 6986-2587 [employer]wise, [employer]. Care instructions adapted under license by [facility] ([facility]). If you have questions about a medical condition or this instruction, always ask your healthcare professional. [address] any warranty or liability for your use of this information. You may receive a survey regarding the care you received during your visit. Your input is valuable to us. We encourage you to complete and return your survey. We hope you will choose us in the future for your healthcare needs. Personalized Preventive Plan for [facility] - 2019  Medicare offers a range of preventive health benefits. Some of the tests and screenings are paid in full while other may be subject to a deductible, co-insurance, and/or copay. Some of these benefits include a comprehensive review of your medical history including lifestyle, illnesses that may run in your family, and various assessments and screenings as appropriate.     After reviewing your medical record and screening and assessments performed today your provider may have ordered immunizations, labs, imaging, and/or referrals for you. A list of these orders (if applicable) as well as your Preventive Care list are included within your After Visit Summary for your review. Other Preventive Recommendations:    · A preventive eye exam performed by an eye specialist is recommended every 1-2 years to screen for glaucoma; cataracts, macular degeneration, and other eye disorders. · A preventive dental visit is recommended every 6 months. · Try to get at least 150 minutes of exercise per week or 10,000 steps per day on a pedometer . · Order or download the FREE \"Exercise & Physical Activity: Your Everyday Guide\" from [employer]. Call 7-548.998.7014 or search [employer] online. · You need 3951-4822 mg of calcium and 6651-4621 IU of vitamin D per day. It is possible to meet your calcium requirement with diet alone, but a vitamin D supplement is usually necessary to meet this goal.  · When exposed to the sun, use a sunscreen that protects against both UVA and UVB radiation with an SPF of 30 or greater. Reapply every 2 to 3 hours or after sweating, drying off with a towel, or swimming. · Always wear a seat belt when traveling in a car. Always wear a helmet when riding a bicycle or motorcycle.

## 2019-06-14 NOTE — PROGRESS NOTES
Chief Complaint   Patient presents with   [facility] AWV     Here today for Medicare AWV.  Cough     C/O cough with chest congestion, sinus congestion, HA, SOB x 2 weeks. Using inhaler.  Medication Refill     Requesting Rx for Naproxen with refills. Rafal Ram is a 70 y. o.female      Here for follow up of chronic health problems including:    Patient Active Problem List   Diagnosis    Tobacco abuse    Class 3 obesity due to excess calories without serious comorbidity with body mass index (BMI) of 40.0 to 44.9 in adult     PT has had cough, chest congestion, sinus congestion, Headache, sob for the last two weeks. Has been using her inhaler with minimal relief. Does have pain in her left leg from previous femur repair screws. States this causes her back and hip pain. Naprosyn offers moderate relief. Pt has had a 3# weight loss in the last 3 months    Review of Systems   Constitutional: Positive for activity change, appetite change and fatigue. Negative for chills, diaphoresis and fever. HENT: Positive for postnasal drip and rhinorrhea. Respiratory: Positive for cough, shortness of breath (with exertion) and wheezing. Cardiovascular: Negative for chest pain, palpitations and leg swelling. Gastrointestinal: Negative for blood in stool, constipation, diarrhea, nausea and vomiting. Genitourinary: Negative for dysuria and hematuria. Musculoskeletal: Positive for back pain and gait problem (due to back and hip pain). Negative for myalgias. Neurological: Negative for dizziness and headaches. All other systems reviewed and are negative.         OBJECTIVE     /74 (Site: Left Upper Arm, Cuff Size: Large Adult)   Pulse 68   Temp 97.6 °F (36.4 °C) (Oral)   Resp 20   Ht 5' 1.5\" (1.562 m)   Wt 242 lb (109.8 kg)   SpO2 93%   BMI 44.99 kg/m²     Wt Readings from Last 3 Encounters:   06/14/19 242 lb (109.8 kg)   03/22/19 247 lb (112 kg)   02/19/19 242 lb 6 oz (109.9 kg)       Physical Exam   Constitutional: She is oriented to person, place, and time. She appears well-developed and well-nourished. HENT:   Head: Normocephalic and atraumatic. Right Ear: External ear normal.   Left Ear: External ear normal.   Nose: Nose normal.   Mouth/Throat: Oropharynx is clear and moist.   Eyes: Pupils are equal, round, and reactive to light. Conjunctivae and EOM are normal.   Neck: Normal range of motion. Neck supple. Cardiovascular: Normal rate, regular rhythm, normal heart sounds and intact distal pulses. Pulmonary/Chest: Effort normal. She has wheezes. Abdominal: Soft. Bowel sounds are normal.   Musculoskeletal: Normal range of motion. Neurological: She is alert and oriented to person, place, and time. She has normal reflexes. Skin: Skin is warm and dry. Small red area below right great toe   Psychiatric: She has a normal mood and affect. Her behavior is normal. Judgment and thought content normal.   Nursing note and vitals reviewed. Immunization History   Administered Date(s) Administered    Pneumococcal 13-valent Conjugate ([id number]) 11/29/2018    Pneumococcal Polysaccharide ([id number]) 08/01/2009    Tdap (Boostrix, Adacel) 09/26/2011       Health Maintenance   Topic Date Due    Cervical cancer screen  06/25/1974    Diabetes screen  06/25/1993    Colon cancer screen colonoscopy  05/05/2018    DEXA (modify frequency per FRAX score)  06/25/2018    Potassium monitoring  02/05/2019    Creatinine monitoring  02/05/2019    Shingles Vaccine (1 of 2) 06/14/2020 (Originally 6/25/2003)    Hepatitis C screen  06/20/2022 (Originally 1955)    HIV screen  06/20/2022 (Originally 6/25/1968)    Flu vaccine (Season Ended) 09/01/2019    Pneumococcal 65+ years Vaccine (2 of 2 - PPSV23) 11/29/2019    Breast cancer screen  09/05/2020    DTaP/Tdap/Td vaccine (2 - Td) 09/26/2021    Lipid screen  10/18/2022       No future appointments. ASSESSMENT      1. COPD exacerbation ([facility] [address]    2. Bronchitis, acute, with bronchospasm    3. Primary osteoarthritis of both knees    4. Personal history of tobacco use, presenting hazards to health    5. Screening for colorectal cancer    6. Screening for diabetes mellitus (DM)    7. Screening for hyperlipidemia    8. Routine general medical examination at a health care facility        PLAN        Requested Prescriptions     Signed Prescriptions Disp Refills    naproxen (NAPROSYN) 250 MG tablet 60 tablet 3     Sig: TAKE 1 TABLET BY MOUTH TWICE DAILY AS NEEDED FOR PAIN    albuterol sulfate HFA (PROAIR HFA) 108 (90 Base) MCG/ACT inhaler 1 Inhaler 3     Sig: Inhale 2 puffs into the lungs every 4-6 hours as needed for wheezing x1 month    predniSONE (DELTASONE) 10 MG tablet 30 tablet 0     Si po qd for 3 days, then 3 po qd for 3 days, then 2 po qd for 3 days, then 1 po qd for 3 days    azithromycin (ZITHROMAX) 250 MG tablet 6 tablet 0     Sig: Take 1 tablet by mouth See Admin Instructions for 5 days 500mg on day 1 followed by 250mg on days 2 - 5       Orders Placed This Encounter   Procedures    Lipid Panel     Standing Status:   Future     Standing Expiration Date:   2020     Order Specific Question:   Is Patient Fasting?/# of Hours     Answer:   yes/12    Comprehensive Metabolic Panel     Standing Status:   Future     Standing Expiration Date:   2020    Amb External Referral To Gastroenterology     Referral Priority:   Routine     Referral Type:   Consult for Advice and Opinion     Referral Reason:   Specialty Services Required     Referred to Provider:   Ari Johnson MD     Requested Specialty:   Gastroenterology     Number of Visits Requested:   1    [place] TOBACCO USE CESSATION INTERMEDIATE 3-10 MINUTES [37706]     Declines Dexa at this time  Encouraged screening COLONOSCOPY- referral placed.  Last one 08  Encouraged NEW SHINGLES SH[facility]) - check with insurance re coverage and location of

## 2019-06-14 NOTE — PROGRESS NOTES
Medicare Annual Wellness Visit  Name: Cyndee Leung Date: 2019   MRN: [id number] Sex: Female   Age: 70 y.o. Ethnicity: Non-/Non    : 1955 Race: White      Zoey Edwards is here for [employer] (Here today for Medicare AWV.); Cough (C/O cough with chest congestion, sinus congestion, HA, SOB x 2 weeks. Using inhaler.); and Medication Refill (Requesting Rx for Naproxen with refills. )    Screenings for behavioral, psychosocial and functional/safety risks, and cognitive dysfunction are all negative except as indicated below. These results, as well as other patient data from the [address] form, are documented in Flowsheets linked to this Encounter. Allergies   Allergen Reactions    Flonase [Fluticasone Propionate] Other (See Comments)     Causes Epistaxis    Mucinex D [Pseudoephedrine-Guaifenesin Er] Other (See Comments)     Doesn't work, makes congestion worse.  Tramadol      Headache      Prior to Visit Medications    Medication Sig Taking? Authorizing Provider   naproxen (NAPROSYN) 250 MG tablet TAKE 1 TABLET BY MOUTH TWICE DAILY AS NEEDED FOR PAIN Yes GAETANO Wells - CNP   PROAIR  (90 Base) MCG/ACT inhaler INHALE 1 PUFF INTO THE LUNGS EVERY 6 HOURS AS NEEDED FOR WHEEZING OR SHORTNESS OF BREATH Yes Rainer Osei MD   lisinopril (PRINIVIL;ZESTRIL) 5 MG tablet Take 1 tablet by mouth daily  Patient taking differently: Take 5 mg by mouth daily Takes PRN Yes Rainer Osei MD   Calcium Citrate-Vitamin D (CALCIUM + D PO) Take by mouth daily Yes Historical Provider, MD   UNABLE TO FIND Occuvite 1 QD Yes Historical Provider, MD   fexofenadine (ALLEGRA) 180 MG tablet Take 180 mg by mouth daily.  Yes Historical Provider, MD     Past Medical History:   Diagnosis Date    Anxiety     Chronic back pain     Diverticulosis     Obesity     Osteoarthritis     Tobacco abuse     Tobacco abuse      Past Surgical History:   Procedure Laterality Date    APPENDECTOMY  1990's    CHOLECYSTECTOMY  1990's    COLONOSCOPY  2008    Dr. Terry Byrne Left 10/08/2016    femur fracture--done [address] [facility] [address]  2008    JOINT REPLACEMENT      Right total knee    KNEE ARTHROSCOPY  1998    left knee    KNEE ARTHROSCOPY Right 9/30/15,12/9/15    Dr. Pato Peña Right 05/23/2016    TONSILLECTOMY AND ADENOIDECTOMY  1980's     Family History   Problem Relation Age of Onset    High Blood Pressure Mother     Stroke Mother     Diabetes Father        CareTeam (Including outside providers/suppliers regularly involved in providing care):   Patient Care Team:  Hyacinth Garza MD as PCP - General (Family Medicine)  Hyacinth Garza [facility]    Wt Readings from Last 3 Encounters:   06/14/19 242 lb (109.8 kg)   03/22/19 247 lb (112 kg)   02/19/19 242 lb 6 oz (109.9 kg)     Vitals:    06/14/19 1422   BP: 118/74   Site: Left Upper Arm   Cuff Size: Large Adult   Pulse: 68   Resp: 20   Temp: 97.6 °F (36.4 °C)   TempSrc: Oral   SpO2: 93%   Weight: 242 lb (109.8 kg)   Height: 5' 1.5\" (1.562 m)     Body mass index is 44.99 kg/m². Based upon direct observation of the patient, evaluation of cognition reveals recent and remote memory intact.     General Appearance: alert and oriented to person, place and time, well developed and well- nourished, in no acute distress  Skin: warm and dry, no rash or erythema  Head: normocephalic and atraumatic  Eyes: pupils equal, round, and reactive to light, extraocular eye movements intact, conjunctivae normal  ENT: tympanic membrane, external ear and ear canal normal bilaterally, nose without deformity, nasal mucosa and turbinates normal without polyps  Neck: supple and non-tender without mass, no thyromegaly or thyroid nodules, no cervical lymphadenopathy  Pulmonary/Chest: clear to auscultation bilaterally- no wheezes, rales or rhonchi, normal air movement, issue    Personalized Preventive Plan   Current Health Maintenance Status  Immunization History   Administered Date(s) Administered    Pneumococcal 13-valent Conjugate ([id number]) 11/29/2018    Pneumococcal Polysaccharide (Cfangnsyt84) 08/01/2009    Tdap (Boostrix, Adacel) 09/26/2011        Health Maintenance   Topic Date Due    Cervical cancer screen  06/25/1974    Diabetes screen  06/25/1993    Shingles Vaccine (1 of 2) 06/25/2003    Colon cancer screen colonoscopy  05/05/2018    DEXA (modify frequency per FRAX score)  06/25/2018    Potassium monitoring  02/05/2019    Creatinine monitoring  02/05/2019    Hepatitis C screen  06/20/2022 (Originally 1955)    HIV screen  06/20/2022 (Originally 6/25/1968)    Flu vaccine (Season Ended) 09/01/2019    Pneumococcal 65+ years Vaccine (2 of 2 - PPSV23) 11/29/2019    Breast cancer screen  09/05/2020    DTaP/Tdap/Td vaccine (2 - Td) 09/26/2021    Lipid screen  10/18/2022     Recommendations for Preventive Services Due: see orders and patient instructions/AVS.  . Recommended screening schedule for the next 5-10 years is provided to the patient in written form: see Patient Instructions/AVS.        Cardiovascular Disease Risk Counseling: Assessed the patient's risk to develop cardiovascular disease and reviewed main risk factors. Reviewed steps to reduce disease risk including:   · Quitting tobacco use, reducing amount smoked, or not starting the habit  · Making healthy food choices  · Being physically active and gradualy increasing activity levels   · Reduce weight and determine a healthy BMI goal  · Monitor blood pressure and treat if higher than 140/90 mmHg  · Maintain blood total cholesterol levels under 5 mmol/l or 190 mg/dl  · Maintain LDL cholesterol levels under 3.0 mmol/l or 115 mg/dl   · Control blood glucose levels  · Consider taking aspirin (75 mg daily), once blood pressure is controlled   Provided a follow up plan.   Time spent (minutes): 10

## 2019-06-18 ENCOUNTER — HOSPITAL ENCOUNTER (OUTPATIENT)
Age: 64
Discharge: HOME | End: 2019-06-18
Payer: MEDICARE

## 2019-06-18 DIAGNOSIS — R53.83 FATIGUE, UNSPECIFIED TYPE: ICD-10-CM

## 2019-06-18 DIAGNOSIS — Z13.1 SCREENING FOR DIABETES MELLITUS (DM): ICD-10-CM

## 2019-06-18 DIAGNOSIS — Z13.220 SCREENING FOR HYPERLIPIDEMIA: ICD-10-CM

## 2019-06-18 LAB
ALBUMIN SERPL-MCNC: 4.2 G/DL (ref 3.5–5.1)
ALP BLD-CCNC: 90 U/L (ref 38–126)
ALT SERPL-CCNC: 7 U/L (ref 11–66)
ANION GAP SERPL CALCULATED.3IONS-SCNC: 11 MEQ/L (ref 8–16)
AST SERPL-CCNC: 14 U/L (ref 5–40)
BILIRUB SERPL-MCNC: 0.4 MG/DL (ref 0.3–1.2)
BUN BLDV-MCNC: 15 MG/DL (ref 7–22)
CALCIUM SERPL-MCNC: 9.3 MG/DL (ref 8.5–10.5)
CHLORIDE BLD-SCNC: 99 MEQ/L (ref 98–111)
CHOLESTEROL, TOTAL: 161 MG/DL (ref 100–199)
CO2: 31 MEQ/L (ref 23–33)
CREAT SERPL-MCNC: 0.6 MG/DL (ref 0.4–1.2)
GFR SERPL CREATININE-BSD FRML MDRD: > 90 ML/MIN/1.73M2
GLUCOSE BLD-MCNC: 79 MG/DL (ref 70–108)
HDLC SERPL-MCNC: 61 MG/DL
LDL CHOLESTEROL CALCULATED: 81 MG/DL
POTASSIUM SERPL-SCNC: 4.5 MEQ/L (ref 3.5–5.2)
SODIUM BLD-SCNC: 141 MEQ/L (ref 135–145)
T4 FREE: 1.26 NG/DL (ref 0.93–1.76)
TOTAL PROTEIN: 7.2 G/DL (ref 6.1–8)
TRIGL SERPL-MCNC: 97 MG/DL (ref 0–199)
TSH SERPL DL<=0.05 MIU/L-ACNC: 1.92 UIU/ML (ref 0.4–4.2)

## 2019-06-18 PROCEDURE — 80053 COMPREHEN METABOLIC PANEL: CPT

## 2019-06-18 PROCEDURE — 84443 ASSAY THYROID STIM HORMONE: CPT

## 2019-06-18 PROCEDURE — 84439 ASSAY OF FREE THYROXINE: CPT

## 2019-06-18 PROCEDURE — 80061 LIPID PANEL: CPT

## 2019-06-18 PROCEDURE — 36415 COLL VENOUS BLD VENIPUNCTURE: CPT

## 2019-06-19 ENCOUNTER — TELEPHONE (OUTPATIENT)
Dept: FAMILY MEDICINE CLINIC | Age: 64
End: 2019-06-19

## 2019-06-19 NOTE — TELEPHONE ENCOUNTER
Left message to call back.         ----- Message from GAETANO Snow CNP sent at 6/19/2019  7:51 AM EDT -----  Please let patient know her labs were all normal. Thanks, TS

## 2019-06-20 RX ORDER — LISINOPRIL 5 MG/1
5 TABLET ORAL DAILY
Qty: 30 TABLET | Refills: 0 | Status: SHIPPED | OUTPATIENT
Start: 2019-06-20 | End: 2019-10-25 | Stop reason: DRUGHIGH

## 2019-06-20 NOTE — TELEPHONE ENCOUNTER
Oj December for refill. Any chest pain, headache, arm/neck/jaw numbness or tingling? If any of these are occurring, she will need evaluated in ED. Please have her call me an update tomorrow.  TS

## 2019-07-02 ENCOUNTER — TELEPHONE (OUTPATIENT)
Dept: FAMILY MEDICINE CLINIC | Age: 64
End: 2019-07-02

## 2019-07-24 ENCOUNTER — OFFICE VISIT (OUTPATIENT)
Dept: FAMILY MEDICINE CLINIC | Age: 64
End: 2019-07-24
Payer: MEDICARE

## 2019-07-24 VITALS
HEART RATE: 62 BPM | WEIGHT: 241.4 LBS | DIASTOLIC BLOOD PRESSURE: 72 MMHG | SYSTOLIC BLOOD PRESSURE: 138 MMHG | OXYGEN SATURATION: 95 % | RESPIRATION RATE: 18 BRPM | BODY MASS INDEX: 44.87 KG/M2

## 2019-07-24 DIAGNOSIS — R53.83 FATIGUE, UNSPECIFIED TYPE: ICD-10-CM

## 2019-07-24 DIAGNOSIS — R06.02 SOB (SHORTNESS OF BREATH): Primary | ICD-10-CM

## 2019-07-24 DIAGNOSIS — Z87.891 PERSONAL HISTORY OF TOBACCO USE, PRESENTING HAZARDS TO HEALTH: ICD-10-CM

## 2019-07-24 PROCEDURE — 99214 OFFICE O/P EST MOD 30 MIN: CPT | Performed by: NURSE PRACTITIONER

## 2019-07-24 ASSESSMENT — ENCOUNTER SYMPTOMS
CONSTIPATION: 0
VOMITING: 0
COUGH: 1
BLOOD IN STOOL: 0
SHORTNESS OF BREATH: 1
WHEEZING: 1
DIARRHEA: 0
NAUSEA: 0

## 2019-07-24 NOTE — PATIENT INSTRUCTIONS
Patient Education        beclomethasone inhalation  Pronunciation:  be kloe METH a sone  Brand:  Qvar, Qvar Redihaler  What is the most important information I should know about beclomethasone inhalation? Beclomethasone inhalation will not work fast enough to treat an asthma attack. Use only a fast acting inhalation medicine for an asthma attack. What is beclomethasone inhalation? Beclomethasone is a steroid. It prevents the release of substances in the body that cause inflammation. Beclomethasone inhalation is used to prevent asthma attacks in adults and children who are at least 11years old. This medication will not treat an asthma attack that has already begun. Beclomethasone inhalation may also be used for purposes not listed in this medication guide. What should I discuss with my healthcare provider before using beclomethasone inhalation? You should not use beclomethasone if you are allergic to it. To make sure this medicine is safe for you, tell your doctor if you have ever had:  · any type of bacterial, fungal, or viral infection;  · active tuberculosis infection that is not being treated;  · herpes infection of the eyes;  · osteoporosis, or low bone mineral density;  · a weak immune system; or  · cataracts, glaucoma, or increased pressure inside your eyes. It is not known whether this medicine will harm an unborn baby. Tell your doctor if you are pregnant or plan to become pregnant. Beclomethasone inhalation can pass into breast milk and may harm a nursing baby. You should not breast-feed while using this medicine. Beclomethasone should not be given to a child younger than 11years old. How should I use beclomethasone inhalation? Follow all directions on your prescription label. Do not use this medicine in larger or smaller amounts or for longer than recommended. Beclomethasone is not a rescue medicine. It will not work fast enough to treat an asthma attack.  Use only a fast-acting asthma

## 2019-08-13 ENCOUNTER — HOSPITAL ENCOUNTER (OUTPATIENT)
Dept: PULMONOLOGY | Age: 64
Discharge: HOME | End: 2019-08-13
Payer: MEDICARE

## 2019-08-13 DIAGNOSIS — R06.02 SOB (SHORTNESS OF BREATH): ICD-10-CM

## 2019-08-13 PROCEDURE — 94060 EVALUATION OF WHEEZING: CPT

## 2019-08-13 PROCEDURE — 94729 DIFFUSING CAPACITY: CPT

## 2019-08-13 PROCEDURE — 94726 PLETHYSMOGRAPHY LUNG VOLUMES: CPT

## 2019-08-22 ENCOUNTER — TELEPHONE (OUTPATIENT)
Dept: FAMILY MEDICINE CLINIC | Age: 64
End: 2019-08-22

## 2019-09-18 ENCOUNTER — OFFICE VISIT (OUTPATIENT)
Dept: FAMILY MEDICINE CLINIC | Age: 64
End: 2019-09-18
Payer: MEDICARE

## 2019-09-18 VITALS
WEIGHT: 243 LBS | OXYGEN SATURATION: 95 % | HEART RATE: 63 BPM | DIASTOLIC BLOOD PRESSURE: 84 MMHG | RESPIRATION RATE: 16 BRPM | SYSTOLIC BLOOD PRESSURE: 146 MMHG | TEMPERATURE: 98.4 F | BODY MASS INDEX: 45.17 KG/M2

## 2019-09-18 DIAGNOSIS — M79.605 LEFT LEG PAIN: ICD-10-CM

## 2019-09-18 DIAGNOSIS — R09.81 NASAL CONGESTION: ICD-10-CM

## 2019-09-18 DIAGNOSIS — R05.9 COUGH: Primary | ICD-10-CM

## 2019-09-18 PROCEDURE — 99213 OFFICE O/P EST LOW 20 MIN: CPT | Performed by: NURSE PRACTITIONER

## 2019-09-18 ASSESSMENT — ENCOUNTER SYMPTOMS
COUGH: 1
RHINORRHEA: 1
SHORTNESS OF BREATH: 0
CONSTIPATION: 1
VOMITING: 0
DIARRHEA: 0
NAUSEA: 0
BLOOD IN STOOL: 0

## 2019-10-17 ENCOUNTER — HOSPITAL ENCOUNTER (OUTPATIENT)
Dept: WOMENS IMAGING | Age: 64
Discharge: HOME | End: 2019-10-17
Payer: MEDICARE

## 2019-10-17 DIAGNOSIS — Z12.31 VISIT FOR SCREENING MAMMOGRAM: ICD-10-CM

## 2019-10-17 PROCEDURE — 77067 SCR MAMMO BI INCL CAD: CPT

## 2019-10-17 PROCEDURE — 77063 BREAST TOMOSYNTHESIS BI: CPT

## 2019-10-24 ENCOUNTER — TELEPHONE (OUTPATIENT)
Dept: FAMILY MEDICINE CLINIC | Age: 64
End: 2019-10-24

## 2019-10-25 RX ORDER — LISINOPRIL 10 MG/1
10 TABLET ORAL DAILY
Qty: 30 TABLET | Refills: 0 | Status: SHIPPED | OUTPATIENT
Start: 2019-10-25 | End: 2019-11-05 | Stop reason: SDUPTHER

## 2019-10-25 RX ORDER — ALBUTEROL SULFATE 90 UG/1
AEROSOL, METERED RESPIRATORY (INHALATION)
Qty: 1 INHALER | Refills: 3 | Status: SHIPPED | OUTPATIENT
Start: 2019-10-25 | End: 2020-01-03 | Stop reason: ALTCHOICE

## 2019-10-25 RX ORDER — LISINOPRIL 10 MG/1
10 TABLET ORAL DAILY
Qty: 90 TABLET | Refills: 0 | OUTPATIENT
Start: 2019-10-25

## 2019-11-05 ENCOUNTER — OFFICE VISIT (OUTPATIENT)
Dept: FAMILY MEDICINE CLINIC | Age: 64
End: 2019-11-05
Payer: MEDICARE

## 2019-11-05 VITALS
RESPIRATION RATE: 20 BRPM | DIASTOLIC BLOOD PRESSURE: 90 MMHG | HEART RATE: 68 BPM | TEMPERATURE: 97.3 F | WEIGHT: 241 LBS | BODY MASS INDEX: 44.8 KG/M2 | SYSTOLIC BLOOD PRESSURE: 146 MMHG

## 2019-11-05 DIAGNOSIS — I10 ESSENTIAL HYPERTENSION: Primary | ICD-10-CM

## 2019-11-05 PROCEDURE — 99213 OFFICE O/P EST LOW 20 MIN: CPT | Performed by: FAMILY MEDICINE

## 2019-11-05 RX ORDER — LISINOPRIL 10 MG/1
10 TABLET ORAL DAILY
Qty: 30 TABLET | Refills: 5 | Status: SHIPPED | OUTPATIENT
Start: 2019-11-05 | End: 2020-04-07 | Stop reason: SDUPTHER

## 2019-11-05 ASSESSMENT — ENCOUNTER SYMPTOMS
SHORTNESS OF BREATH: 0
GASTROINTESTINAL NEGATIVE: 1
COUGH: 0

## 2019-12-17 ENCOUNTER — OFFICE VISIT (OUTPATIENT)
Dept: FAMILY MEDICINE CLINIC | Age: 64
End: 2019-12-17
Payer: MEDICARE

## 2019-12-17 VITALS
DIASTOLIC BLOOD PRESSURE: 80 MMHG | TEMPERATURE: 97.8 F | WEIGHT: 245 LBS | BODY MASS INDEX: 45.54 KG/M2 | SYSTOLIC BLOOD PRESSURE: 133 MMHG | HEART RATE: 64 BPM

## 2019-12-17 DIAGNOSIS — J20.9 BRONCHITIS, ACUTE, WITH BRONCHOSPASM: Primary | ICD-10-CM

## 2019-12-17 DIAGNOSIS — B96.89 ACUTE BACTERIAL SINUSITIS: ICD-10-CM

## 2019-12-17 DIAGNOSIS — J01.90 ACUTE BACTERIAL SINUSITIS: ICD-10-CM

## 2019-12-17 PROCEDURE — 99214 OFFICE O/P EST MOD 30 MIN: CPT | Performed by: NURSE PRACTITIONER

## 2019-12-17 RX ORDER — CEFDINIR 300 MG/1
300 CAPSULE ORAL 2 TIMES DAILY
Qty: 20 CAPSULE | Refills: 0 | Status: SHIPPED | OUTPATIENT
Start: 2019-12-17 | End: 2019-12-27

## 2019-12-17 RX ORDER — METHYLPREDNISOLONE 4 MG/1
TABLET ORAL
Qty: 1 KIT | Refills: 0 | Status: SHIPPED | OUTPATIENT
Start: 2019-12-17 | End: 2019-12-23

## 2019-12-17 ASSESSMENT — ENCOUNTER SYMPTOMS
SINUS PAIN: 1
SHORTNESS OF BREATH: 0
HOARSE VOICE: 0
TROUBLE SWALLOWING: 0
SINUS PRESSURE: 1
SWOLLEN GLANDS: 0
WHEEZING: 1
COUGH: 1
RHINORRHEA: 1
SORE THROAT: 1
BACK PAIN: 0

## 2019-12-25 ENCOUNTER — PATIENT MESSAGE (OUTPATIENT)
Dept: FAMILY MEDICINE CLINIC | Age: 64
End: 2019-12-25

## 2019-12-26 ENCOUNTER — PATIENT MESSAGE (OUTPATIENT)
Dept: FAMILY MEDICINE CLINIC | Age: 64
End: 2019-12-26

## 2019-12-26 ENCOUNTER — HOSPITAL ENCOUNTER (EMERGENCY)
Age: 64
Discharge: HOME | End: 2019-12-26
Payer: MEDICARE

## 2019-12-26 VITALS
HEIGHT: 62 IN | WEIGHT: 245 LBS | RESPIRATION RATE: 16 BRPM | SYSTOLIC BLOOD PRESSURE: 142 MMHG | DIASTOLIC BLOOD PRESSURE: 74 MMHG | HEART RATE: 73 BPM | OXYGEN SATURATION: 92 % | TEMPERATURE: 98.6 F | BODY MASS INDEX: 45.08 KG/M2

## 2019-12-26 DIAGNOSIS — B97.89 ACUTE VIRAL SINUSITIS: Primary | ICD-10-CM

## 2019-12-26 DIAGNOSIS — J01.90 ACUTE VIRAL SINUSITIS: Primary | ICD-10-CM

## 2019-12-26 PROCEDURE — 99212 OFFICE O/P EST SF 10 MIN: CPT

## 2019-12-26 PROCEDURE — 99213 OFFICE O/P EST LOW 20 MIN: CPT | Performed by: NURSE PRACTITIONER

## 2019-12-26 RX ORDER — AZELASTINE 1 MG/ML
1 SPRAY, METERED NASAL 2 TIMES DAILY
Qty: 1 BOTTLE | Refills: 3
Start: 2019-12-26 | End: 2020-07-15

## 2019-12-26 RX ORDER — PREDNISONE 20 MG/1
20 TABLET ORAL 2 TIMES DAILY
Qty: 10 TABLET | Refills: 0 | Status: SHIPPED | OUTPATIENT
Start: 2019-12-26 | End: 2019-12-31

## 2019-12-26 ASSESSMENT — ENCOUNTER SYMPTOMS
SINUS PAIN: 1
COUGH: 1
SORE THROAT: 1
WHEEZING: 1
RHINORRHEA: 1
SWOLLEN GLANDS: 0

## 2019-12-27 ENCOUNTER — TELEPHONE (OUTPATIENT)
Dept: FAMILY MEDICINE CLINIC | Age: 64
End: 2019-12-27

## 2020-01-03 ENCOUNTER — OFFICE VISIT (OUTPATIENT)
Dept: FAMILY MEDICINE CLINIC | Age: 65
End: 2020-01-03
Payer: MEDICARE

## 2020-01-03 VITALS
BODY MASS INDEX: 44.04 KG/M2 | HEART RATE: 80 BPM | SYSTOLIC BLOOD PRESSURE: 128 MMHG | TEMPERATURE: 97.5 F | DIASTOLIC BLOOD PRESSURE: 72 MMHG | RESPIRATION RATE: 18 BRPM | WEIGHT: 240.8 LBS

## 2020-01-03 PROCEDURE — G0009 ADMIN PNEUMOCOCCAL VACCINE: HCPCS | Performed by: FAMILY MEDICINE

## 2020-01-03 PROCEDURE — 99213 OFFICE O/P EST LOW 20 MIN: CPT | Performed by: FAMILY MEDICINE

## 2020-01-03 PROCEDURE — 90732 PPSV23 VACC 2 YRS+ SUBQ/IM: CPT | Performed by: FAMILY MEDICINE

## 2020-01-03 ASSESSMENT — PATIENT HEALTH QUESTIONNAIRE - PHQ9
2. FEELING DOWN, DEPRESSED OR HOPELESS: 0
SUM OF ALL RESPONSES TO PHQ QUESTIONS 1-9: 0
SUM OF ALL RESPONSES TO PHQ QUESTIONS 1-9: 0
1. LITTLE INTEREST OR PLEASURE IN DOING THINGS: 0
SUM OF ALL RESPONSES TO PHQ9 QUESTIONS 1 & 2: 0

## 2020-01-03 NOTE — PROGRESS NOTES
Chief Complaint   Patient presents with    Follow-up     pt overall doing well, does complain of an ongoing cough/sinus infection states since her BP has been up    Discuss Medications     states shes not sure if she is allergic to cefidinr had some issues taking it     Immunizations     discuss pneumonia vaccine          SUBJECTIVE     Zoey Fabian is a 75 y. o.female      Pt complains of ongoing wheezing. She has been treated with antibiotics and prednisone with minimal relief. Has not been taking Qvar because she couldn't afford it. Using albuterol as needed. BPs stable. She is doing well with her BP med. Takes all meds as directed and denies side effects. She continues to smoke. Body mass index is 44.04 kg/m². Review of Systems   Constitutional: Negative. HENT: Negative. Respiratory: Positive for cough and wheezing. Cardiovascular: Negative. Gastrointestinal: Negative. Genitourinary: Negative. Neurological: Negative. All other systems reviewed and are negative. OBJECTIVE     /72   Pulse 80   Temp 97.5 °F (36.4 °C) (Oral)   Resp 18   Wt 240 lb 12.8 oz (109.2 kg)   BMI 44.04 kg/m²     BP Readings from Last 3 Encounters:   01/03/20 128/72   12/26/19 (!) 142/74   12/17/19 133/80       Physical Exam  Vitals signs reviewed. Constitutional:       General: She is not in acute distress. Appearance: She is well-developed. HENT:      Head: Normocephalic and atraumatic. Right Ear: Tympanic membrane normal.      Left Ear: Tympanic membrane normal.      Mouth/Throat:      Mouth: Mucous membranes are moist.      Pharynx: No posterior oropharyngeal erythema. Eyes:      Conjunctiva/sclera: Conjunctivae normal.   Cardiovascular:      Rate and Rhythm: Normal rate and regular rhythm. Heart sounds: No murmur. Pulmonary:      Breath sounds: Wheezing present. Lymphadenopathy:      Cervical: No cervical adenopathy.    Neurological:      Mental Status: She is alert. ASSESSMENT      1. Essential hypertension    2. Need for pneumococcal vaccine    3. Tobacco abuse          PLAN     Continue current BP med    Add Advair 100/50 1 puff inh bid. Rinse mouth after use    Pneumovax today    Smoking cessation again encouraged.       Orders Placed This Encounter   Procedures    Pneumococcal polysaccharide vaccine 23-valent greater than or equal to 1yo subcutaneous/IM         Follow up if not better            Electronically signed by Jairo Carrion MD on 1/3/2020 at 11:58 AM

## 2020-01-03 NOTE — PROGRESS NOTES
Immunization(s) given during visit:    Immunizations Administered     Name Date Dose Route    Pneumococcal Polysaccharide ([id number]) 1/3/2020 0.5 mL Intramuscular    Site: Deltoid- Left    Lot: [id number]    NDC: 3107-0593-23

## 2020-01-05 RX ORDER — ALBUTEROL SULFATE 90 UG/1
AEROSOL, METERED RESPIRATORY (INHALATION)
Qty: 1 INHALER | Refills: 3 | COMMUNITY
Start: 2020-01-05 | End: 2020-07-15 | Stop reason: SDUPTHER

## 2020-01-05 ASSESSMENT — ENCOUNTER SYMPTOMS
GASTROINTESTINAL NEGATIVE: 1
COUGH: 1
WHEEZING: 1

## 2020-04-07 RX ORDER — LISINOPRIL 10 MG/1
10 TABLET ORAL DAILY
Qty: 90 TABLET | Refills: 2 | Status: SHIPPED | OUTPATIENT
Start: 2020-04-07 | End: 2021-01-18 | Stop reason: SDUPTHER

## 2020-04-07 NOTE — TELEPHONE ENCOUNTER
Request for #90/3 for pt's lisinopril from the pt's insurance.      Last written: 11-5-19 #30/5  Last seen: 1-3-20  Next visit: no future    Order pended for #90/2

## 2020-07-15 ENCOUNTER — OFFICE VISIT (OUTPATIENT)
Dept: FAMILY MEDICINE CLINIC | Age: 65
End: 2020-07-15
Payer: MEDICARE

## 2020-07-15 VITALS
WEIGHT: 236.2 LBS | BODY MASS INDEX: 43.2 KG/M2 | SYSTOLIC BLOOD PRESSURE: 144 MMHG | TEMPERATURE: 97.9 F | DIASTOLIC BLOOD PRESSURE: 70 MMHG | RESPIRATION RATE: 18 BRPM

## 2020-07-15 PROCEDURE — 99214 OFFICE O/P EST MOD 30 MIN: CPT | Performed by: NURSE PRACTITIONER

## 2020-07-15 RX ORDER — AMOXICILLIN 875 MG/1
875 TABLET, COATED ORAL 2 TIMES DAILY
Qty: 20 TABLET | Refills: 0 | Status: SHIPPED | OUTPATIENT
Start: 2020-07-15 | End: 2020-07-25

## 2020-07-15 RX ORDER — ALBUTEROL SULFATE 90 UG/1
AEROSOL, METERED RESPIRATORY (INHALATION)
Qty: 3 INHALER | Refills: 3 | Status: SHIPPED | OUTPATIENT
Start: 2020-07-15 | End: 2021-06-21 | Stop reason: SDUPTHER

## 2020-07-15 ASSESSMENT — ENCOUNTER SYMPTOMS
VOMITING: 0
BLOOD IN STOOL: 0
DIARRHEA: 0
CONSTIPATION: 0
COUGH: 1
BACK PAIN: 1
SINUS PRESSURE: 1
SHORTNESS OF BREATH: 1
NAUSEA: 0

## 2020-07-15 NOTE — PATIENT INSTRUCTIONS
Patient Education        Sinusitis: Care Instructions  Your Care Instructions        Sinusitis is an infection of the lining of the sinus cavities in your head. Sinusitis often follows a cold. It causes pain and pressure in your head and face. In most cases, sinusitis gets better on its own in 1 to 2 weeks. But some mild symptoms may last for several weeks. Sometimes antibiotics are needed. Follow-up care is a key part of your treatment and safety. Be sure to make and go to all appointments, and call your doctor if you are having problems. It's also a good idea to know your test results and keep a list of the medicines you take. How can you care for yourself at home? · Take an over-the-counter pain medicine, such as acetaminophen (Tylenol), ibuprofen (Advil, Motrin), or naproxen (Aleve). Read and follow all instructions on the label. · If the doctor prescribed antibiotics, take them as directed. Do not stop taking them just because you feel better. You need to take the full course of antibiotics. · Be careful when taking over-the-counter cold or flu medicines and Tylenol at the same time. Many of these medicines have acetaminophen, which is Tylenol. Read the labels to make sure that you are not taking more than the recommended dose. Too much acetaminophen (Tylenol) can be harmful. · Breathe warm, moist air from a steamy shower, a hot bath, or a sink filled with hot water. Avoid cold, dry air. Using a humidifier in your home may help. Follow the directions for cleaning the machine. · Use saline (saltwater) nasal washes to help keep your nasal passages open and wash out mucus and bacteria. You can buy saline nose drops at a grocery store or drugstore. Or you can make your own at home by adding 1 teaspoon of salt and 1 teaspoon of baking soda to 2 cups of distilled water. If you make your own, fill a bulb syringe with the solution, insert the tip into your nostril, and squeeze gently. Jenena Good your nose.   · Put a hot, wet towel or a warm gel pack on your face 3 or 4 times a day for 5 to 10 minutes each time. · Try a decongestant nasal spray like oxymetazoline (Afrin). Do not use it for more than 3 days in a row. Using it for more than 3 days can make your congestion worse. When should you call for help? Call your doctor now or seek immediate medical care if:  · You have new or worse swelling or redness in your face or around your eyes. · You have a new or higher fever. Watch closely for changes in your health, and be sure to contact your doctor if:  · You have new or worse facial pain. · The mucus from your nose becomes thicker (like pus) or has new blood in it. · You are not getting better as expected. Where can you learn more? Go to https://[email or web address]chaneleweb.SiOx. org and sign in to your [employer] account. Enter A894 in the Visib[employer] box to learn more about \"Sinusitis: Care Instructions. \"     If you do not have an account, please click on the \"Sign Up Now\" link. Current as of: July 29, 2019               Content Version: 12.5  © 6695-4829 [employer]. Care instructions adapted under license by [facility] ([facility]). If you have questions about a medical condition or this instruction, always ask your healthcare professional. [address] any warranty or liability for your use of this information.

## 2020-07-15 NOTE — PROGRESS NOTES
Chief Complaint   Patient presents with    Sinusitis     sinus infection that started a few weeks ago,     Hip Pain     right hip pain misses, knee and goes down to lower leg, x 1 month worsening          SUBJECTIVE     Zoey TOVAR Vasu Bradford is a 77 y. o.female      Pt complains of right hip pain that radiates down her leg. She does have some similar pain on the left side. She denies numbness/tingling and described the pain like a toothache that skips the knee. She is taking naproxen, but is limited due to macular degeneration. This minimally helps the pain. Pt has had a cough since December. She will get a tickle in her throat and that will trigger the cough. Over the past week she has had some nasal pressure and tenderness. She does have a lot of sinus congestions, but not always much drainage. She is taking Allegra. She is bringing up a lot of mucous. Review of Systems   Constitutional: Negative for chills, diaphoresis and fever. HENT: Positive for congestion and sinus pressure. Respiratory: Positive for cough and shortness of breath (occasional). Cardiovascular: Negative for chest pain, palpitations and leg swelling. Gastrointestinal: Negative for blood in stool, constipation, diarrhea, nausea and vomiting. Genitourinary: Negative for dysuria and hematuria. Musculoskeletal: Positive for arthralgias (hips) and back pain. Negative for myalgias. Neurological: Positive for headaches. Negative for dizziness. All other systems reviewed and are negative. OBJECTIVE     BP (!) 144/70   Temp 97.9 °F (36.6 °C)   Resp 18   Wt 236 lb 3.2 oz (107.1 kg)   BMI 43.20 kg/m²     Physical Exam  Vitals signs and nursing note reviewed. Constitutional:       Appearance: She is well-developed. HENT:      Head: Normocephalic and atraumatic. Right Ear: External ear normal.      Left Ear: External ear normal.      Nose: Congestion present.       Right Sinus: Maxillary sinus tenderness and frontal sinus tenderness present. Left Sinus: Maxillary sinus tenderness and frontal sinus tenderness present. Eyes:      Conjunctiva/sclera: Conjunctivae normal.      Pupils: Pupils are equal, round, and reactive to light. Neck:      Musculoskeletal: Normal range of motion and neck supple. Cardiovascular:      Rate and Rhythm: Normal rate and regular rhythm. Heart sounds: Normal heart sounds. Pulmonary:      Effort: Pulmonary effort is normal.      Breath sounds: Normal breath sounds. Abdominal:      General: Bowel sounds are normal.      Palpations: Abdomen is soft. Musculoskeletal: Normal range of motion. Right hip: She exhibits tenderness. Left hip: She exhibits decreased strength. Lumbar back: She exhibits tenderness. Skin:     General: Skin is warm and dry. Neurological:      Mental Status: She is alert and oriented to person, place, and time. Deep Tendon Reflexes: Reflexes are normal and symmetric. Psychiatric:         Behavior: Behavior normal.         Thought Content: Thought content normal.         Judgment: Judgment normal.           No results found for this visit on 07/15/20. ASSESSMENT       Diagnosis Orders   1. Acute non-recurrent sinusitis, unspecified location     2. Lumbar back pain     3. COPD exacerbation ([facility] [address]     4. Right hip pain         PLAN     Requested Prescriptions     Signed Prescriptions Disp Refills    albuterol sulfate HFA (PROAIR HFA) 108 (90 Base) MCG/ACT inhaler 3 Inhaler 3     Sig: Inhale 2 puffs into the lungs every 4-6 hours as needed for wheezing    amoxicillin (AMOXIL) 875 MG tablet 20 tablet 0     Sig: Take 1 tablet by mouth 2 times daily for 10 days     Will hold on xrays until patient checks on insurance coverage for desired orthopaedic surgeon.   Amoxicillin sent to pharmacy  Refills sent  Pt to follow up if breathing does not improve after antibiotic      Electronically signed by GAETANO Orona CNP on 7/15/2020 at 4:55

## 2020-10-19 NOTE — TELEPHONE ENCOUNTER
This medication refill is regarding a refill   Refill requested by [address]      Qvar redihaler 40 mcg / act   1 puff 2 x per day      Date of last visit: 7/15/2020  Date of next visit: Visit date not found  Date of last refill: 1/15/20  Pharmacy Name: [address] [facility]

## 2020-10-20 NOTE — TELEPHONE ENCOUNTER
This medication refill is regarding a refill   Refill requested by [address] [place] [address]    Requested Prescriptions     Pending Prescriptions Disp Refills    naproxen (NAPROSYN) 250 MG tablet 60 tablet 3     Sig: TAKE 1 TABLET BY MOUTH TWICE DAILY AS NEEDED FOR PAIN       Date of last visit: 7/15/2020  Date of next visit: Visit date not found  Date of last refill: 6/14/19  Pharmacy Name: [address] [place] [address]

## 2020-10-21 RX ORDER — NAPROXEN 250 MG/1
TABLET ORAL
Qty: 60 TABLET | Refills: 0 | Status: SHIPPED | OUTPATIENT
Start: 2020-10-21 | End: 2021-02-17 | Stop reason: SDUPTHER

## 2020-11-18 ENCOUNTER — OFFICE VISIT (OUTPATIENT)
Dept: FAMILY MEDICINE CLINIC | Age: 65
End: 2020-11-18
Payer: MEDICARE

## 2020-11-18 VITALS
DIASTOLIC BLOOD PRESSURE: 70 MMHG | WEIGHT: 238.2 LBS | BODY MASS INDEX: 43.57 KG/M2 | SYSTOLIC BLOOD PRESSURE: 130 MMHG | TEMPERATURE: 96.7 F | RESPIRATION RATE: 16 BRPM | HEART RATE: 69 BPM

## 2020-11-18 PROCEDURE — G0439 PPPS, SUBSEQ VISIT: HCPCS | Performed by: NURSE PRACTITIONER

## 2020-11-18 ASSESSMENT — PATIENT HEALTH QUESTIONNAIRE - PHQ9
SUM OF ALL RESPONSES TO PHQ QUESTIONS 1-9: 0
SUM OF ALL RESPONSES TO PHQ9 QUESTIONS 1 & 2: 0
1. LITTLE INTEREST OR PLEASURE IN DOING THINGS: 0
SUM OF ALL RESPONSES TO PHQ QUESTIONS 1-9: 0
2. FEELING DOWN, DEPRESSED OR HOPELESS: 0
SUM OF ALL RESPONSES TO PHQ QUESTIONS 1-9: 0

## 2020-11-18 ASSESSMENT — LIFESTYLE VARIABLES
HOW OFTEN DO YOU HAVE A DRINK CONTAINING ALCOHOL: 1
HOW MANY STANDARD DRINKS CONTAINING ALCOHOL DO YOU HAVE ON A TYPICAL DAY: 0
HOW OFTEN DO YOU HAVE SIX OR MORE DRINKS ON ONE OCCASION: 1
AUDIT-C TOTAL SCORE: 2

## 2020-11-18 ASSESSMENT — ENCOUNTER SYMPTOMS
COUGH: 1
NAUSEA: 0
CONSTIPATION: 0
SHORTNESS OF BREATH: 1
VOMITING: 0
SINUS PRESSURE: 1
DIARRHEA: 0
BLOOD IN STOOL: 0

## 2020-11-18 NOTE — PROGRESS NOTES
Chief Complaint   Patient presents with   [facility] AW[facility]     still having breathing issues       SUBJECTIVE     Zoey Gutierrez is a 77 y. o.female      Here for Medicare Annual Wellness Visit    Patient Active Problem List   Diagnosis    Tobacco abuse    Class 3 obesity due to excess calories without serious comorbidity with body mass index (BMI) of 40.0 to 44.9 in adult    Essential hypertension     Pt notes increased sob. States this is mostly with exertion or wearing a mask. Currently using Qvar once daily as it affects her sleep if she uses it at night. Is using Albuterol about three times during day and about twice at night. Qvar is $90/month and albuterol is 3 inhalers for $30. Currently smoking 13 cigarettes daily. Pt c/o constant sinus pressure. Crowder not feel sinus rinses help. She is using Sinex at this time. She has uses Flonase in the past but it causes nose bleeds. Monitors BP occasionally. Reports it is always below 140/90. Weight increased 2# since last visit 4 months ago. Review of Systems   Constitutional: Negative for chills, diaphoresis and fever. HENT: Positive for sinus pressure. Respiratory: Positive for cough (baseline) and shortness of breath (chronic). Cardiovascular: Negative for chest pain, palpitations and leg swelling. Gastrointestinal: Negative for blood in stool, constipation, diarrhea, nausea and vomiting. Genitourinary: Negative for dysuria and hematuria. Musculoskeletal: Negative for myalgias. Neurological: Negative for dizziness and headaches. All other systems reviewed and are negative.             OBJECTIVE     /70 (Site: Left Upper Arm)   Pulse 69   Temp 96.7 °F (35.9 °C) (Temporal)   Resp 16   Wt 238 lb 3.2 oz (108 kg)   BMI 43.57 kg/m²     Wt Readings from Last 3 Encounters:   11/18/20 238 lb 3.2 oz (108 kg)   07/15/20 236 lb 3.2 oz (107.1 kg)   01/03/20 240 lb 12.8 oz (109.2 kg)       Physical Exam  Vitals signs and nursing note reviewed. Constitutional:       Appearance: She is well-developed. HENT:      Head: Normocephalic and atraumatic. Right Ear: External ear normal.      Left Ear: External ear normal.      Nose: Nose normal.   Eyes:      Conjunctiva/sclera: Conjunctivae normal.      Pupils: Pupils are equal, round, and reactive to light. Neck:      Musculoskeletal: Normal range of motion and neck supple. Cardiovascular:      Rate and Rhythm: Normal rate and regular rhythm. Heart sounds: Normal heart sounds. Pulmonary:      Effort: Pulmonary effort is normal.      Breath sounds: Wheezing present. Abdominal:      General: Bowel sounds are normal.      Palpations: Abdomen is soft. Musculoskeletal: Normal range of motion. Skin:     General: Skin is warm and dry. Neurological:      Mental Status: She is alert and oriented to person, place, and time. Deep Tendon Reflexes: Reflexes are normal and symmetric. Psychiatric:         Behavior: Behavior normal.         Thought Content:  Thought content normal.         Judgment: Judgment normal.           Component      Latest Ref Rng & Units 6/18/2019   Glucose      70 - 108 mg/dL 79   Creatinine      0.4 - 1.2 mg/dL 0.6   BUN      7 - 22 mg/dL 15   Sodium      135 - 145 meq/L 141   Potassium      3.5 - 5.2 meq/L 4.5   Chloride      98 - 111 meq/L 99   CO2      23 - 33 meq/L 31   Calcium      8.5 - 10.5 mg/dL 9.3   AST      5 - 40 U/L 14   Alk Phos      38 - 126 U/L 90   Total Protein      6.1 - 8.0 g/dL 7.2   Albumin      3.5 - 5.1 g/dL 4.2   Bilirubin      0.3 - 1.2 mg/dL 0.4   ALT      11 - 66 U/L 7 (L)   Cholesterol, Total      100 - 199 mg/dL 161   Triglycerides      0 - 199 mg/dL 97   HDL Cholesterol      mg/dL 61   LDL Calculated      mg/dL 81   TSH      0.400 - 4.20 uIU/mL 1.920   T4 Free      0.93 - 1.76 ng/dL 1.26   Anion Gap      8.0 - 16.0 meq/L 11.0   Est, Glom Filt Rate      ml/min/1.73m2 >90       Immunization History   Administered Date(s) Administered    Pneumococcal Conjugate 13-valent ([id number]) 11/29/2018    Pneumococcal Polysaccharide ([id number]) 08/01/2009, 01/03/2020    Tdap (Boostrix, Adacel) 09/26/2011         Health Maintenance   Topic Date Due    Shingles Vaccine (1 of 2) 06/25/2003    DEXA (modify frequency per FRAX score)  06/25/2008    Annual Wellness Visit (AWV)  05/29/2019    Potassium monitoring  06/18/2020    Creatinine monitoring  06/18/2020    Flu vaccine (1) 09/01/2020    Hepatitis C screen  06/20/2022 (Originally 1955)    DTaP/Tdap/Td vaccine (2 - Td) 09/26/2021    Breast cancer screen  10/17/2021    Lipid screen  06/18/2024    Colon cancer screen colonoscopy  08/01/2024    Pneumococcal 65+ years Vaccine  Completed    Hepatitis A vaccine  Aged Out    Hepatitis B vaccine  Aged Out    Hib vaccine  Aged Out    Meningococcal (ACWY) vaccine  Aged Out       Future Appointments   Date Time Provider Rossi Hein   5/19/2021  2:00 PM Niko Clemente, APRN - CNP [facility] [place]         ASSESSMENT       Diagnosis Orders   1. Routine general medical examination at a health care facility  Comprehensive Metabolic Panel    Lipid Panel   2. Essential hypertension  Comprehensive Metabolic Panel    Lipid Panel   3. Screening, lipid  Lipid Panel   4. Tobacco abuse     5. Class 3 severe obesity due to excess calories with serious comorbidity and body mass index (BMI) of 40.0 to 44.9 in adult [facility])         PLAN          Orders Placed This Encounter   Procedures    Comprehensive Metabolic Panel     Standing Status:   Future     Standing Expiration Date:   11/18/2021    Lipid Panel     Standing Status:   Future     Standing Expiration Date:   11/18/2021     Order Specific Question:   Is Patient Fasting?/# of Hours     Answer:   yes/12       Encouraged annual FLU VACCINE  - Pt declines  Encouraged NEW SHINGLES SHOT [facility]) - check with insurance re coverage and location of administration.   Colonoscopy completed 19 with Dr Dariela Samuels. To repeat in . Mammo due - Pt would like wait until after Covid surges  Dexa - Pt would like wait until after Covid surges  Pelvic management - Pt declines  Labs - CMP, lipid now  Recommend referral to pulmonology for her ongoing sob. Pt declines at this time. Discussed changing maintenance inhaler to cut down on need for albuterol, she declines due to cost.   Continue current medications  Okay to try Mucinex  Refills  Follow up in 6 months           Return in 6 months (on 2021) for Medicare Annual Wellness Visit in 1 year. Electronically signed by GAETANO Franco CNP on 2020 at 7:39 PM       Medicare Annual Wellness Visit  Name: Jatin York Date: 2020   MRN: [id number] Sex: Female   Age: 77 y.o. Ethnicity: Non-/Non    : 1955 Race: White      Zoey Feliciano surya is here for [employer] (still having breathing issues)    Screenings for behavioral, psychosocial and functional/safety risks, and cognitive dysfunction are all negative except as indicated below. These results, as well as other patient data from the [address] form, are documented in Flowsheets linked to this Encounter. Allergies   Allergen Reactions    Flonase [Fluticasone Propionate] Other (See Comments)     Causes Epistaxis    Mucinex D [Pseudoephedrine-Guaifenesin Er] Other (See Comments)     Doesn't work, makes congestion worse.  Tramadol      Headache          Prior to Visit Medications    Medication Sig Taking?  Authorizing Provider   beclomethasone (QVAR REDIHALER) 40 MCG/ACT AERB inhaler Inhale 1 puff into the lungs 2 times daily Yes Harshil Ervin MD   naproxen (NAPROSYN) 250 MG tablet TAKE 1 TABLET BY MOUTH TWICE DAILY AS NEEDED FOR PAIN Yes Harshil Ervin MD   albuterol sulfate HFA (PROAIR HFA) 108 (90 Base) MCG/ACT inhaler Inhale 2 puffs into the lungs every 4-6 hours as needed for wheezing Yes Nini Gonzalez GAETANO Flores - CNP   lisinopril (PRINIVIL;ZESTRIL) 10 MG tablet Take 1 tablet by mouth daily Yes Mena Hammonds MD   Calcium Citrate-Vitamin D (CALCIUM + D PO) Take by mouth daily Yes Historical Provider, MD   UNABLE TO FIND Occuvite 1 QD Yes Historical Provider, MD   fexofenadine (ALLEGRA) 180 MG tablet Take 180 mg by mouth daily. Yes Historical Provider, MD         Past Medical History:   Diagnosis Date    Anxiety     Chronic back pain     Diverticulosis     Essential hypertension 11/5/2019    Obesity     Osteoarthritis     Tobacco abuse     Tobacco abuse        Past Surgical History:   Procedure Laterality Date    APPENDECTOMY  1990's    CHOLECYSTECTOMY  1990's    COLONOSCOPY  2008    Dr. Jeancarlos Mathews Left 10/08/2016    femur fracture--done in [facility] [address]  2008    JOINT REPLACEMENT      Right total knee    KNEE ARTHROSCOPY  1998    left knee    KNEE ARTHROSCOPY Right 9/30/15,12/9/15    Dr. Jessica Grade Right 05/23/2016    TONSILLECTOMY AND ADENOIDECTOMY  1980's         Family History   Problem Relation Age of Onset    High Blood Pressure Mother     Stroke Mother     Diabetes Father        CareTeam (Including outside providers/suppliers regularly involved in providing care):   Patient Care Team:  Mena Hammonds MD as PCP - General (Family Medicine)  Mena Hammonds MD as PCP - REHABILITATION [facility] Empaneled Provider    Wt Readings from Last 3 Encounters:   11/18/20 238 lb 3.2 oz (108 kg)   07/15/20 236 lb 3.2 oz (107.1 kg)   01/03/20 240 lb 12.8 oz (109.2 kg)     Vitals:    11/18/20 1403   BP: 130/70   Site: Left Upper Arm   Pulse: 69   Resp: 16   Temp: 96.7 °F (35.9 °C)   TempSrc: Temporal   Weight: 238 lb 3.2 oz (108 kg)     Body mass index is 43.57 kg/m². Based upon direct observation of the patient, evaluation of cognition reveals recent and remote memory intact.       Patient's complete Health Risk Assessment and screening values have been reviewed and are found in [facility]. The following problems were reviewed today and where indicated follow up appointments were made and/or referrals ordered. Positive Risk Factor Screenings with Interventions:     Substance History:  Social History     Tobacco History     Smoking Status  Current Every Day Smoker Smoking Frequency  0.25 packs/day for 40 years (10 pk yrs) Smoking Tobacco Type  Cigarettes    Smokeless Tobacco Use  Never Used    Tobacco Comment  9-12 cigarettes per day, trying to cut back          Alcohol History     Alcohol Use Status  Yes Comment  rarely          Drug Use     Drug Use Status  No          Sexual Activity     Sexually Active  Not Currently               Alcohol Screening: Audit-C Score: 2    A score of 8 or more is associated with harmful or hazardous drinking. A score of 13 or more in women, and 15 or more in men, is likely to indicate alcohol dependence. Substance Abuse Interventions:  · Tobacco abuse:  discussed cessation    General Health and ACP:  General  In general, how would you say your health is?: Fair  In the past 7 days, have you experienced any of the following? New or Increased Pain, New or Increased Fatigue, Loneliness, Social Isolation, Stress or Anger?: (!) New or Increased Pain  Do you get the social and emotional support that you need?: Yes  Do you have a Living Will?: Yes  Advance Directives     Power of  Living Will ACP-Advance Directive ACP-Power of     Not on File Not on File Filed [address] [facility] [place] Risk Interventions:  · hips and down legs. Following with Chiropractor.      Health Habits/Nutrition:  Health Habits/Nutrition  Do you exercise for at least 20 minutes 2-3 times per week?: (!) No  Have you lost any weight without trying in the past 3 months?: No  Do you eat fewer than 2 meals per day?: (!) Yes  Have you seen a dentist within the past year?: Yes     Health Habits/Nutrition Interventions:  · Inadequate physical activity: recommend increasing physical activity    Hearing/Vision:  No exam data present  Hearing/Vision  Do you or your family notice any trouble with your hearing?: No  Do you have difficulty driving, watching TV, or doing any of your daily activities because of your eyesight?: No  Have you had an eye exam within the past year?: (!) No  Hearing/Vision Interventions:  · recommend vision exam    Safety:  Safety  Do you have working smoke detectors?: Yes  Have all throw rugs been removed or fastened?: (!) No  Do you have non-slip mats or surfaces in all bathtubs/showers?: (!) No  Do all of your stairways have a railing or banister?: Yes  Are your doorways, halls and stairs free of clutter?: Yes  Do you always fasten your seatbelt when you are in a car?: Yes  Safety Interventions:  · Home safety tips provided    Personalized Preventive Plan   Current Health Maintenance Status  Immunization History   Administered Date(s) Administered    Pneumococcal Conjugate 13-valent ([id number]) 11/29/2018    Pneumococcal Polysaccharide ([id number]) 08/01/2009, 01/03/2020    Tdap (Boostrix, Adacel) 09/26/2011        Health Maintenance   Topic Date Due    Shingles Vaccine (1 of 2) 06/25/2003    DEXA (modify frequency per FRAX score)  06/25/2008    Annual Wellness Visit (AWV)  05/29/2019    Potassium monitoring  06/18/2020    Creatinine monitoring  06/18/2020    Flu vaccine (1) 09/01/2020    Hepatitis C screen  06/20/2022 (Originally 1955)    DTaP/Tdap/Td vaccine (2 - Td) 09/26/2021    Breast cancer screen  10/17/2021    Lipid screen  06/18/2024    Colon cancer screen colonoscopy  08/01/2024    Pneumococcal 65+ years Vaccine  Completed    Hepatitis A vaccine  Aged Out    Hepatitis B vaccine  Aged Out    Hib vaccine  Aged Out    Meningococcal (ACWY) vaccine  Aged Out     Recommendations for [employer] Due: see orders and patient instructions/AVS.  .   Recommended screening schedule for the next 5-10 years is provided to the patient in written form: see Patient Katiuska Cash was seen today for medicare awv. Diagnoses and all orders for this visit:    Routine general medical examination at a health care facility  -     Comprehensive Metabolic Panel; Future  -     Lipid Panel; Future    Essential hypertension  -     Comprehensive Metabolic Panel; Future  -     Lipid Panel; Future    Screening, lipid  -     Lipid Panel;  Future    Tobacco abuse    Class 3 severe obesity due to excess calories with serious comorbidity and body mass index (BMI) of 40.0 to 44.9 in [facility])

## 2020-11-18 NOTE — PATIENT INSTRUCTIONS
Personalized Preventive Plan for [facility] - 11/18/2020  Medicare offers a range of preventive health benefits. Some of the tests and screenings are paid in full while other may be subject to a deductible, co-insurance, and/or copay. Some of these benefits include a comprehensive review of your medical history including lifestyle, illnesses that may run in your family, and various assessments and screenings as appropriate. After reviewing your medical record and screening and assessments performed today your provider may have ordered immunizations, labs, imaging, and/or referrals for you. A list of these orders (if applicable) as well as your Preventive Care list are included within your After Visit Summary for your review. Other Preventive Recommendations:    · A preventive eye exam performed by an eye specialist is recommended every 1-2 years to screen for glaucoma; cataracts, macular degeneration, and other eye disorders. · A preventive dental visit is recommended every 6 months. · Try to get at least 150 minutes of exercise per week or 10,000 steps per day on a pedometer . · Order or download the FREE \"Exercise & Physical Activity: Your Everyday Guide\" from [employer]. Call 7-414.946.7820 or search [employer] online. · You need 6843-2434 mg of calcium and 6967-7253 IU of vitamin D per day. It is possible to meet your calcium requirement with diet alone, but a vitamin D supplement is usually necessary to meet this goal.  · When exposed to the sun, use a sunscreen that protects against both UVA and UVB radiation with an SPF of 30 or greater. Reapply every 2 to 3 hours or after sweating, drying off with a towel, or swimming. · Always wear a seat belt when traveling in a car. Always wear a helmet when riding a bicycle or motorcycle.

## 2020-12-10 ENCOUNTER — NURSE ONLY (OUTPATIENT)
Dept: LAB | Age: 65
End: 2020-12-10

## 2020-12-10 LAB
ALBUMIN SERPL-MCNC: 4.1 G/DL (ref 3.5–5.1)
ALP BLD-CCNC: 81 U/L (ref 38–126)
ALT SERPL-CCNC: 9 U/L (ref 11–66)
ANION GAP SERPL CALCULATED.3IONS-SCNC: 13 MEQ/L (ref 8–16)
AST SERPL-CCNC: 20 U/L (ref 5–40)
BILIRUB SERPL-MCNC: 0.5 MG/DL (ref 0.3–1.2)
BUN BLDV-MCNC: 10 MG/DL (ref 7–22)
CALCIUM SERPL-MCNC: 9.5 MG/DL (ref 8.5–10.5)
CHLORIDE BLD-SCNC: 98 MEQ/L (ref 98–111)
CHOLESTEROL, TOTAL: 122 MG/DL (ref 100–199)
CO2: 30 MEQ/L (ref 23–33)
CREAT SERPL-MCNC: 0.5 MG/DL (ref 0.4–1.2)
GFR SERPL CREATININE-BSD FRML MDRD: > 90 ML/MIN/1.73M2
GLUCOSE BLD-MCNC: 100 MG/DL (ref 70–108)
HDLC SERPL-MCNC: 49 MG/DL
LDL CHOLESTEROL CALCULATED: 56 MG/DL
POTASSIUM SERPL-SCNC: 4.2 MEQ/L (ref 3.5–5.2)
SODIUM BLD-SCNC: 141 MEQ/L (ref 135–145)
TOTAL PROTEIN: 7 G/DL (ref 6.1–8)
TRIGL SERPL-MCNC: 84 MG/DL (ref 0–199)

## 2021-01-15 DIAGNOSIS — I10 ESSENTIAL HYPERTENSION: ICD-10-CM

## 2021-01-15 NOTE — TELEPHONE ENCOUNTER
This medication refill is regarding a refill   Refill requested by [address] [place] [address]   Requested Prescriptions     Pending Prescriptions Disp Refills    lisinopril (PRINIVIL;ZESTRIL) 10 MG tablet 90 tablet 2     Sig: Take 1 tablet by mouth daily       Date of last visit: 11/18/2020  Date of next visit: Visit date not found  Date of last refill: 4/7/20  Pharm[facility]: [facility]    Last Lipid Panel:    Lab Results   Component Value Date    CHOL 122 12/10/2020    TRIG 84 12/10/2020    HDL 49 12/10/2020    HDL 40 05/19/2011    LDLCALC 56 12/10/2020     Last CMP:   Lab Results   Component Value Date     12/10/2020    K 4.2 12/10/2020    CL 98 12/10/2020    CO2 30 12/10/2020    BUN 10 12/10/2020    CREATININE 0.5 12/10/2020    GLUCOSE 100 12/10/2020    CALCIUM 9.5 12/10/2020    PROT 7.0 12/10/2020    LABALBU 4.1 12/10/2020    BILITOT 0.5 12/10/2020    ALKPHOS 81 12/10/2020    AST 20 12/10/2020    ALT 9 (L) 12/10/2020    LABGLOM >90 12/10/2020       Last Thyroid:    Lab Results   Component Value Date    TSH 1.920 06/18/2019    T4FREE 1.26 06/18/2019     Last Hemoglobin A1C:  No results found for: LABA1C, AVGG    Rx verified, ordered and set to EP.

## 2021-01-18 RX ORDER — LISINOPRIL 10 MG/1
10 TABLET ORAL DAILY
Qty: 90 TABLET | Refills: 3 | Status: SHIPPED | OUTPATIENT
Start: 2021-01-18 | End: 2021-12-01 | Stop reason: SDUPTHER

## 2021-02-12 ENCOUNTER — TELEPHONE (OUTPATIENT)
Dept: FAMILY MEDICINE CLINIC | Age: 66
End: 2021-02-12

## 2021-02-17 DIAGNOSIS — M17.0 PRIMARY OSTEOARTHRITIS OF BOTH KNEES: ICD-10-CM

## 2021-02-17 RX ORDER — NAPROXEN 250 MG/1
TABLET ORAL
Qty: 60 TABLET | Refills: 0 | Status: SHIPPED | OUTPATIENT
Start: 2021-02-17 | End: 2021-05-26 | Stop reason: SDUPTHER

## 2021-02-17 NOTE — TELEPHONE ENCOUNTER
This medication refill is regarding a electronic request by [facility]    Requested Prescriptions     Pending Prescriptions Disp Refills    naproxen (NAPROSYN) 250 MG tablet 60 tablet 0     Sig: TAKE 1 TABLET BY MOUTH TWICE DAILY AS NEEDED FOR PAIN       Date of last visit: 11/18/2020  Date of next visit: 5/19/2021  Date of last refill: 10/21/2020  60/0    Rx verified, ordered and set to EP.

## 2021-02-17 NOTE — TELEPHONE ENCOUNTER
Rx sent to pharmacy as below:    Requested Prescriptions     Signed Prescriptions Disp Refills    naproxen (NAPROSYN) 250 MG tablet 60 tablet 0     Sig: TAKE 1 TABLET BY MOUTH TWICE DAILY AS NEEDED FOR PAIN     Authorizing Provider: Greggory Meckel           Electronically signed by Pankaj Goddard MD on 2/17/2021 at 2:13 PM

## 2021-05-26 ENCOUNTER — OFFICE VISIT (OUTPATIENT)
Dept: FAMILY MEDICINE CLINIC | Age: 66
End: 2021-05-26
Payer: MEDICARE

## 2021-05-26 VITALS
SYSTOLIC BLOOD PRESSURE: 124 MMHG | TEMPERATURE: 98.4 F | HEART RATE: 60 BPM | WEIGHT: 220.6 LBS | DIASTOLIC BLOOD PRESSURE: 82 MMHG | RESPIRATION RATE: 18 BRPM | HEIGHT: 62 IN | BODY MASS INDEX: 40.59 KG/M2

## 2021-05-26 DIAGNOSIS — M17.0 PRIMARY OSTEOARTHRITIS OF BOTH KNEES: ICD-10-CM

## 2021-05-26 DIAGNOSIS — Z72.0 TOBACCO ABUSE: ICD-10-CM

## 2021-05-26 DIAGNOSIS — I10 ESSENTIAL HYPERTENSION: Primary | ICD-10-CM

## 2021-05-26 DIAGNOSIS — J44.9 CHRONIC OBSTRUCTIVE PULMONARY DISEASE, UNSPECIFIED COPD TYPE (HCC): ICD-10-CM

## 2021-05-26 DIAGNOSIS — Z12.31 ENCOUNTER FOR SCREENING MAMMOGRAM FOR BREAST CANCER: ICD-10-CM

## 2021-05-26 DIAGNOSIS — E66.01 CLASS 3 SEVERE OBESITY DUE TO EXCESS CALORIES WITH SERIOUS COMORBIDITY AND BODY MASS INDEX (BMI) OF 40.0 TO 44.9 IN ADULT (HCC): ICD-10-CM

## 2021-05-26 PROCEDURE — 99214 OFFICE O/P EST MOD 30 MIN: CPT | Performed by: NURSE PRACTITIONER

## 2021-05-26 RX ORDER — NAPROXEN 250 MG/1
TABLET ORAL
Qty: 60 TABLET | Refills: 0 | Status: SHIPPED | OUTPATIENT
Start: 2021-05-26 | End: 2021-10-07

## 2021-05-26 RX ORDER — AMOXICILLIN 500 MG/1
CAPSULE ORAL
COMMUNITY
Start: 2021-04-11 | End: 2021-05-26 | Stop reason: ALTCHOICE

## 2021-05-26 ASSESSMENT — PATIENT HEALTH QUESTIONNAIRE - PHQ9
SUM OF ALL RESPONSES TO PHQ QUESTIONS 1-9: 0
2. FEELING DOWN, DEPRESSED OR HOPELESS: 0
SUM OF ALL RESPONSES TO PHQ9 QUESTIONS 1 & 2: 0
1. LITTLE INTEREST OR PLEASURE IN DOING THINGS: 0

## 2021-05-26 ASSESSMENT — ENCOUNTER SYMPTOMS
BLOOD IN STOOL: 0
VOMITING: 0
BACK PAIN: 1
DIARRHEA: 0
CONSTIPATION: 0
SHORTNESS OF BREATH: 1
NAUSEA: 0
COUGH: 1

## 2021-05-26 NOTE — PATIENT INSTRUCTIONS
Encouraged NEW SHINGLES SHOT [facility]) - check with insurance re coverage and location of administration. Colonoscopy completed 8/1/19 with Dr Jacquie Wilson. To repeat in 2024.   Mammo due - ordered  Dexa - Pt declines at this time  Pelvic management - Pt declines  Labs - Lipid, CMP in 6 months  Continue current medications  Refills - sent  Follow up in 6 months

## 2021-05-26 NOTE — PROGRESS NOTES
Chief Complaint   Patient presents with    6 Month Follow-Up     Pt doing well. No concerns. Stephany Banks is a 77 y. o.female      Here for follow up of HTN and COPD. Patient Active Problem List   Diagnosis    Tobacco abuse    Class 3 obesity due to excess calories without serious comorbidity with body mass index (BMI) of 40.0 to 44.9 in adult    Essential hypertension     Pt reports her breathing has been better since she lost almost 20lbs. She is trying to cut down on snacking lately. She is using 1 puff of albuterol in the mornings. She has not been using the Qvar lately. Home BP has not been monitored recently. Review of Systems   Constitutional: Negative for chills, diaphoresis and fever. Respiratory: Positive for cough and shortness of breath (occasionally - chronic). Cardiovascular: Negative for chest pain, palpitations and leg swelling. Gastrointestinal: Negative for blood in stool, constipation, diarrhea, nausea and vomiting. Genitourinary: Negative for dysuria and hematuria. Musculoskeletal: Positive for arthralgias (right hip) and back pain. Negative for myalgias. Neurological: Negative for dizziness and headaches. All other systems reviewed and are negative. OBJECTIVE     /82   Pulse 60   Temp 98.4 °F (36.9 °C) (Oral)   Resp 18   Ht 5' 2\" (1.575 m)   Wt 220 lb 9.6 oz (100.1 kg)   BMI 40.35 kg/m²     Wt Readings from Last 3 Encounters:   05/26/21 220 lb 9.6 oz (100.1 kg)   11/18/20 238 lb 3.2 oz (108 kg)   07/15/20 236 lb 3.2 oz (107.1 kg)   Weight decreased 18 # since last visit 6 months ago. Physical Exam  Vitals and nursing note reviewed. Constitutional:       Appearance: She is well-developed. HENT:      Head: Normocephalic and atraumatic.       Right Ear: External ear normal.      Left Ear: External ear normal.      Nose: Nose normal.   Eyes:      Conjunctiva/sclera: Conjunctivae normal.      Pupils: Pupils are equal, round, and reactive to light. Cardiovascular:      Rate and Rhythm: Normal rate and regular rhythm. Heart sounds: Normal heart sounds. Pulmonary:      Effort: Pulmonary effort is normal.      Breath sounds: Normal breath sounds. Abdominal:      General: Bowel sounds are normal.      Palpations: Abdomen is soft. Musculoskeletal:         General: Normal range of motion. Cervical back: Normal range of motion and neck supple. Skin:     General: Skin is warm and dry. Neurological:      Mental Status: She is alert and oriented to person, place, and time. Deep Tendon Reflexes: Reflexes are normal and symmetric. Psychiatric:         Behavior: Behavior normal.         Thought Content:  Thought content normal.         Judgment: Judgment normal.         Component      Latest Ref Rng & Units 12/10/2020   Glucose      70 - 108 mg/dL 100   Creatinine      0.4 - 1.2 mg/dL 0.5   BUN      7 - 22 mg/dL 10   Sodium      135 - 145 meq/L 141   Potassium      3.5 - 5.2 meq/L 4.2   Chloride      98 - 111 meq/L 98   CO2      23 - 33 meq/L 30   Calcium      8.5 - 10.5 mg/dL 9.5   AST      5 - 40 U/L 20   Alk Phos      38 - 126 U/L 81   Total Protein      6.1 - 8.0 g/dL 7.0   Albumin      3.5 - 5.1 g/dL 4.1   Bilirubin      0.3 - 1.2 mg/dL 0.5   ALT      11 - 66 U/L 9 (L)   Cholesterol, Total      100 - 199 mg/dL 122   Triglycerides      0 - 199 mg/dL 84   HDL Cholesterol      mg/dL 49   LDL Calculated      mg/dL 56   Anion Gap      8.0 - 16.0 meq/L 13.0   Est, Glom Filt Rate      ml/min/1.73m2 >90       Immunization History   Administered Date(s) Administered    COVID-19, Moderna, PF, 100mcg/0.5mL 02/17/2021, 03/17/2021    Pneumococcal Conjugate 13-valent ([id number]) 11/29/2018    Pneumococcal Polysaccharide ([id number]) 08/01/2009, 01/03/2020    Tdap (Boostrix, Adacel) 09/26/2011         Health Maintenance   Topic Date Due    Shingles Vaccine (1 of 2) Never done    DEXA (modify frequency per [facility] score)  Never done    Hepatitis C screen  06/20/2022 (Originally 1955)    Flu vaccine (Season Ended) 09/01/2021    DTaP/Tdap/Td vaccine (2 - Td) 09/26/2021    Breast cancer screen  10/17/2021    Annual Wellness Visit (AWV)  11/19/2021    Potassium monitoring  12/10/2021    Creatinine monitoring  12/10/2021    Colon cancer screen colonoscopy  08/01/2024    Lipid screen  12/10/2025    Pneumococcal 65+ years Vaccine  Completed    COVID-19 Vaccine  Completed    Hepatitis A vaccine  Aged Out    Hepatitis B vaccine  Aged Out    Hib vaccine  Aged Out    Meningococcal (ACWY) vaccine  Aged Out       Future Appointments   Date Time Provider Rossi Hein   12/1/2021  1:30 PM Lesa Farah MD [address] [facility]         ASSESSMENT       Diagnosis Orders   1. Essential hypertension  Comprehensive Metabolic Panel    Lipid Panel   2. Primary osteoarthritis of both knees  naproxen (NAPROSYN) 250 MG tablet   3. Encounter for screening mammogram for breast cancer  SISI DIGITAL SCREEN W OR WO CAD BILATERAL   4. Class 3 severe obesity due to excess calories with serious comorbidity and body mass index (BMI) of 40.0 to 44.9 in adult [facility])  Comprehensive Metabolic Panel    Lipid Panel   5. Tobacco abuse  Lipid Panel   6.  Chronic obstructive pulmonary disease, unspecified COPD type ([facility]         PLAN        Requested Prescriptions     Signed Prescriptions Disp Refills    naproxen (NAPROSYN) 250 MG tablet 60 tablet 0     Sig: TAKE 1 TABLET BY MOUTH TWICE DAILY AS NEEDED FOR PAIN       Orders Placed This Encounter   Procedures    SISI DIGITAL SCREEN W OR WO CAD BILATERAL     Standing Status:   Future     Standing Expiration Date:   5/26/2022     Order Specific Question:   Reason for exam:     Answer:   screening mammo    Comprehensive Metabolic Panel     Standing Status:   Future     Standing Expiration Date:   5/26/2022    Lipid Panel     Standing Status:   Future     Standing Expiration Date:

## 2021-06-14 ENCOUNTER — NURSE TRIAGE (OUTPATIENT)
Dept: OTHER | Facility: CLINIC | Age: 66
End: 2021-06-14

## 2021-06-14 ENCOUNTER — OFFICE VISIT (OUTPATIENT)
Dept: FAMILY MEDICINE CLINIC | Age: 66
End: 2021-06-14
Payer: MEDICARE

## 2021-06-14 VITALS
SYSTOLIC BLOOD PRESSURE: 110 MMHG | RESPIRATION RATE: 16 BRPM | DIASTOLIC BLOOD PRESSURE: 64 MMHG | BODY MASS INDEX: 39.69 KG/M2 | WEIGHT: 217 LBS | HEART RATE: 76 BPM

## 2021-06-14 DIAGNOSIS — R06.2 WHEEZING: ICD-10-CM

## 2021-06-14 DIAGNOSIS — R09.81 SINUS CONGESTION: ICD-10-CM

## 2021-06-14 DIAGNOSIS — J40 BRONCHITIS: Primary | ICD-10-CM

## 2021-06-14 PROCEDURE — 99213 OFFICE O/P EST LOW 20 MIN: CPT | Performed by: NURSE PRACTITIONER

## 2021-06-14 RX ORDER — AMOXICILLIN 875 MG/1
875 TABLET, COATED ORAL 2 TIMES DAILY
Qty: 20 TABLET | Refills: 0 | Status: SHIPPED | OUTPATIENT
Start: 2021-06-14 | End: 2021-06-24

## 2021-06-14 RX ORDER — PREDNISONE 20 MG/1
20 TABLET ORAL 2 TIMES DAILY
Qty: 10 TABLET | Refills: 0 | Status: SHIPPED | OUTPATIENT
Start: 2021-06-14 | End: 2021-06-19

## 2021-06-14 SDOH — ECONOMIC STABILITY: FOOD INSECURITY: WITHIN THE PAST 12 MONTHS, THE FOOD YOU BOUGHT JUST DIDN'T LAST AND YOU DIDN'T HAVE MONEY TO GET MORE.: NEVER TRUE

## 2021-06-14 SDOH — ECONOMIC STABILITY: FOOD INSECURITY: WITHIN THE PAST 12 MONTHS, YOU WORRIED THAT YOUR FOOD WOULD RUN OUT BEFORE YOU GOT MONEY TO BUY MORE.: NEVER TRUE

## 2021-06-14 ASSESSMENT — SOCIAL DETERMINANTS OF HEALTH (SDOH): HOW HARD IS IT FOR YOU TO PAY FOR THE VERY BASICS LIKE FOOD, HOUSING, MEDICAL CARE, AND HEATING?: NOT HARD AT ALL

## 2021-06-14 NOTE — PATIENT INSTRUCTIONS
Patient Education        prednisone  Pronunciation:  PRED ni les  Brand:  Mario  What is the most important information I should know about prednisone? You should not use prednisone if you have a fungal infection anywhere in your body. You should not stop using prednisone suddenly. Follow your doctor's instructions about tapering your dose. What is prednisone? Prednisone is a steroid that reduces inflammation in the body, and also suppresses your immune system. Prednisone is used to treat many different conditions such as hormonal disorders, skin diseases, arthritis, lupus, psoriasis, allergic conditions, ulcerative colitis, Crohn's disease, eye diseases, lung diseases, asthma, tuberculosis, blood cell disorders, kidney disorders, leukemia, lymphoma, multiple sclerosis, organ transplant rejection, swelling from a brain tumor or injury. Prednisone may also be used for purposes not listed in this medication guide. What should I discuss with my healthcare provider before taking prednisone? You should not use prednisone if you are allergic to it, or if you have a fungal infection anywhere in your body. Steroid medication can weaken your immune system, making it easier for you to get an infection or worsening an infection you already have. Tell your doctor about any illness or infection you've had within the past several weeks.   Tell your doctor if you have ever had:  · heart problems, high blood pressure, or a heart attack;  · glaucoma or cataracts;  · herpes infection of the eyes;  · past or present tuberculosis;  · a parasite infection that causes diarrhea (such as threadworms);  · any illness that causes diarrhea;  · underactive thyroid;  · diabetes;  · a stomach ulcer, diverticulitis;  · a colostomy or ileostomy;  · osteoporosis or low bone mineral density (steroid medication can increase your risk of bone loss);  · low levels of calcium or potassium in your blood;  · cirrhosis or other liver disease;  · mental illness or psychosis; or  · a muscle disorder such as myasthenia gravis. Long-term use of steroids may lead to bone loss (osteoporosis), especially if you smoke or drink alcohol, if you do not exercise, or if you do not get enough vitamin D or calcium in your diet. It is not known whether this medicine will harm an unborn baby. Tell your doctor if you are pregnant or plan to become pregnant. You should not breastfeed while using prednisone. How should I take prednisone? Follow all directions on your prescription label and read all medication guides or instruction sheets. Your doctor may occasionally change your dose. Use the medicine exactly as directed. Prednisone is taken daily or every other day, depending on the condition being treated. You may need to take the medicine at a certain time of day. Follow your doctor's instructions about when and how often to take this medicine. Take with food if prednisone upsets your stomach. Measure liquid medicine carefully. Use the dosing syringe provided, or use a medicine dose-measuring device (not a kitchen spoon). Swallow the delayed-release tablet whole and do not crush, chew, or break it. Prednisone can weaken (suppress) your immune system, and you may get an infection more easily. Call your doctor if you have signs of infection (fever, weakness, cold or flu symptoms, skin sores, diarrhea, frequent or recurring illness). If you have major surgery or a severe injury or infection, your prednisone dose needs may change. Make sure any doctor caring for you knows you are using this medicine. If you use this medicine long-term, you may need medical tests and vision exams. In case of emergency, wear or carry medical identification to let others know you use a steroid. You should not stop using prednisone suddenly. Follow your doctor's instructions about tapering your dose. Store at room temperature away from moisture, heat, and light.   What happens if I --leg cramps, constipation, irregular heartbeats, fluttering in your chest, increased thirst or urination, numbness or tingling, muscle weakness or limp feeling. Prednisone can affect growth in children. Tell your doctor if your child is not growing at a normal rate while using this medicine. Common side effects may include:  · weight gain (especially in your face or your upper back and torso);  · increased appetite;  · mood changes, trouble sleeping;  · changes in your menstrual periods;  · problems with memory or thought;  · muscle or joint pain;  · weakness;  · headache, dizziness, spinning sensation;  · nausea, bloating, loss of appetite;  · slow wound healing; or  · acne, increased sweating, thinning skin, bruising, pinpoint spots under your skin. This is not a complete list of side effects and others may occur. Call your doctor for medical advice about side effects. You may report side effects to FDA at 1-060-FDA-2788. What other drugs will affect prednisone? Sometimes it is not safe to use certain medications at the same time. Some drugs can affect your blood levels of other drugs you take, which may increase side effects or make the medications less effective. Tell your doctor about all your current medicines. Many drugs can affect prednisone, especially:  · bupropion;  · cyclosporine;  · digoxin;  · ketoconazole;  · an antibiotic;  · birth control pills or hormone replacement therapy;  · a diuretic or \"water pill\";  · insulin or oral diabetes medicine;  · a blood thinner --warfarin, Coumadin, Jantoven; or  · NSAIDs (nonsteroidal anti-inflammatory drugs) --aspirin, ibuprofen (Advil, Motrin), naproxen (Aleve), celecoxib, diclofenac, indomethacin, meloxicam, and others. This list is not complete and many other drugs may affect prednisone. This includes prescription and over-the-counter medicines, vitamins, and herbal products. Not all possible drug interactions are listed here.   Where can I get more healthcare professional. [address] any warranty or liability for your use of this information.

## 2021-06-14 NOTE — PROGRESS NOTES
Chief Complaint   Patient presents with    Cough    Congestion         SUBJECTIVE     Zoey Fields is a 77 y. o.female      Patient complains of Sinus pressure, right nostril being blocked, ear fullness, and sore throat starting Last Friday. She has been sneezing and coughing as well. She did throw up phlegm, but was unsure if it came from her nose or chest. Neti pot only temporarily helps her sinus symptoms. She is having trouble sleeping due to the cough. Denies fever. Denies chest tightness. Some sob when she tries to breath through her nose and ambulate at the same time, but is okay at rest. Pulse ox ranges 92-95%. She is using her inhalers. Review of Systems   Constitutional: Positive for fatigue. Negative for chills, diaphoresis and fever. HENT: Positive for congestion, sinus pressure, sneezing and sore throat. Respiratory: Positive for cough. Negative for shortness of breath. Cardiovascular: Negative for chest pain, palpitations and leg swelling. Gastrointestinal: Negative for blood in stool, constipation, diarrhea, nausea and vomiting. Genitourinary: Negative for dysuria and hematuria. Musculoskeletal: Negative for myalgias. Neurological: Negative for dizziness and headaches. All other systems reviewed and are negative. OBJECTIVE     /64   Pulse 76   Resp 16   Wt 217 lb (98.4 kg)   BMI 39.69 kg/m²     Physical Exam  Vitals and nursing note reviewed. Constitutional:       Appearance: She is well-developed. HENT:      Head: Normocephalic and atraumatic. Right Ear: External ear normal.      Left Ear: External ear normal.      Nose: Congestion present. Right Turbinates: Swollen. Left Turbinates: Swollen. Eyes:      Conjunctiva/sclera: Conjunctivae normal.      Pupils: Pupils are equal, round, and reactive to light. Cardiovascular:      Rate and Rhythm: Normal rate and regular rhythm. Heart sounds: Normal heart sounds.    Pulmonary: Effort: Pulmonary effort is normal.      Breath sounds: Decreased breath sounds and wheezing present. Abdominal:      General: Bowel sounds are normal.      Palpations: Abdomen is soft. Musculoskeletal:         General: Normal range of motion. Cervical back: Normal range of motion and neck supple. Skin:     General: Skin is warm and dry. Neurological:      Mental Status: She is alert and oriented to person, place, and time. Deep Tendon Reflexes: Reflexes are normal and symmetric. Psychiatric:         Behavior: Behavior normal.         Thought Content: Thought content normal.         Judgment: Judgment normal.         No results found for this visit on 06/14/21. ASSESSMENT       Diagnosis Orders   1. Bronchitis  predniSONE (DELTASONE) 20 MG tablet    amoxicillin (AMOXIL) 875 MG tablet   2. Wheezing  predniSONE (DELTASONE) 20 MG tablet    amoxicillin (AMOXIL) 875 MG tablet   3.  Sinus congestion  predniSONE (DELTASONE) 20 MG tablet       PLAN     Requested Prescriptions     Signed Prescriptions Disp Refills    predniSONE (DELTASONE) 20 MG tablet 10 tablet 0     Sig: Take 1 tablet by mouth 2 times daily for 5 days    amoxicillin (AMOXIL) 875 MG tablet 20 tablet 0     Sig: Take 1 tablet by mouth 2 times daily for 10 days       Meds as above  Continue sinus rinses  Increase rest and fluids  Follow up as needed        Electronically signed by GAETANO Marino CNP on 6/15/2021 at 1:54 PM

## 2021-06-15 ASSESSMENT — ENCOUNTER SYMPTOMS
SINUS PRESSURE: 1
COUGH: 1
NAUSEA: 0
SORE THROAT: 1
BLOOD IN STOOL: 0
CONSTIPATION: 0
DIARRHEA: 0
SHORTNESS OF BREATH: 0
VOMITING: 0

## 2021-06-21 RX ORDER — ALBUTEROL SULFATE 90 UG/1
AEROSOL, METERED RESPIRATORY (INHALATION)
Qty: 3 INHALER | Refills: 3 | Status: SHIPPED | OUTPATIENT
Start: 2021-06-21 | End: 2022-07-11

## 2021-06-21 NOTE — TELEPHONE ENCOUNTER
This medication refill is regarding a telephone request from the patient:    Requested Prescriptions     Pending Prescriptions Disp Refills    albuterol sulfate HFA (PROAIR HFA) 108 (90 Base) MCG/ACT inhaler 3 Inhaler 3     Sig: Inhale 2 puffs into the lungs every 4-6 hours as needed for wheezing       Date of last visit: 6/14/2021   Date of next visit: 12/1/2021  Date of last refill: 7/15/20 for a year supply; pt called the pharmacy for a refill and they told her the Rx has been deleted. Pharmacy Name: [facility]    Rx verified, ordered and set to EP.      ROBERT

## 2021-08-12 ENCOUNTER — TELEPHONE (OUTPATIENT)
Dept: FAMILY MEDICINE CLINIC | Age: 66
End: 2021-08-12

## 2021-08-13 NOTE — TELEPHONE ENCOUNTER
Pt returned call that RN left. I explained to her everything in detail that was documented and why she needed to be seen in the office. The patient got extremely upset. I tried my best to explain to her why she had to be seen in the office for the medications to be prescribed and offered her an appt for this Monday. The pt said some profanities and then hung up the phone.

## 2021-08-13 NOTE — TELEPHONE ENCOUNTER
We are not able to prescribe pain medication without seeing patients in person due to [place]'s prescribing laws. If she needs medication, would need an appt.  KAUSHIK

## 2021-09-15 ENCOUNTER — HOSPITAL ENCOUNTER (OUTPATIENT)
Dept: WOMENS IMAGING | Age: 66
Discharge: HOME | End: 2021-09-15
Payer: MEDICARE

## 2021-09-15 DIAGNOSIS — Z12.31 ENCOUNTER FOR SCREENING MAMMOGRAM FOR BREAST CANCER: ICD-10-CM

## 2021-09-15 PROCEDURE — 77063 BREAST TOMOSYNTHESIS BI: CPT

## 2021-10-07 DIAGNOSIS — M17.0 PRIMARY OSTEOARTHRITIS OF BOTH KNEES: ICD-10-CM

## 2021-10-07 RX ORDER — NAPROXEN 250 MG/1
TABLET ORAL
Qty: 60 TABLET | Refills: 0 | Status: SHIPPED | OUTPATIENT
Start: 2021-10-07 | End: 2021-12-01 | Stop reason: SDUPTHER

## 2021-10-07 NOTE — TELEPHONE ENCOUNTER
This medication refill is regarding a electronic request.  Refill requested by Letha 5 Requested Prescriptions     Pending Prescriptions Disp Refills    naproxen (NAPROSYN) 250 MG tablet [Pharmacy Med Name: NAPROXEN 250MG TABLETS] 60 tablet 0     Sig: TAKE 1 TABLET BY MOUTH TWICE DAILY AS NEEDED FOR PAIN     Date of last visit: 6/14/2021   Date of next visit: 12/01/2021  Date of last refill: 05/26/2021 for 60/0    Last Lipid Panel:    Lab Results   Component Value Date    CHOL 122 12/10/2020    TRIG 84 12/10/2020    HDL 49 12/10/2020    HDL 40 05/19/2011    LDLCALC 56 12/10/2020     Last CMP:   Lab Results   Component Value Date     12/10/2020    K 4.2 12/10/2020    CL 98 12/10/2020    CO2 30 12/10/2020    BUN 10 12/10/2020    CREATININE 0.5 12/10/2020    GLUCOSE 100 12/10/2020    CALCIUM 9.5 12/10/2020    PROT 7.0 12/10/2020    LABALBU 4.1 12/10/2020    BILITOT 0.5 12/10/2020    ALKPHOS 81 12/10/2020    AST 20 12/10/2020    ALT 9 (L) 12/10/2020    LABGLOM >90 12/10/2020     Last Thyroid:    Lab Results   Component Value Date    TSH 1.920 06/18/2019    T4FREE 1.26 06/18/2019     Last Hemoglobin A1C:  No results found for: LABA1C, AVGG    Rx verified, ordered and set to EP.

## 2021-11-22 ENCOUNTER — NURSE ONLY (OUTPATIENT)
Dept: LAB | Age: 66
End: 2021-11-22

## 2021-11-22 DIAGNOSIS — Z72.0 TOBACCO ABUSE: ICD-10-CM

## 2021-11-22 DIAGNOSIS — I10 ESSENTIAL HYPERTENSION: ICD-10-CM

## 2021-11-22 DIAGNOSIS — E66.01 CLASS 3 SEVERE OBESITY DUE TO EXCESS CALORIES WITH SERIOUS COMORBIDITY AND BODY MASS INDEX (BMI) OF 40.0 TO 44.9 IN ADULT (HCC): ICD-10-CM

## 2021-11-22 LAB
ALBUMIN SERPL-MCNC: 4.3 G/DL (ref 3.5–5.1)
ALP BLD-CCNC: 102 U/L (ref 38–126)
ALT SERPL-CCNC: < 5 U/L (ref 11–66)
ANION GAP SERPL CALCULATED.3IONS-SCNC: 11 MEQ/L (ref 8–16)
AST SERPL-CCNC: 15 U/L (ref 5–40)
BILIRUB SERPL-MCNC: 0.4 MG/DL (ref 0.3–1.2)
BUN BLDV-MCNC: 11 MG/DL (ref 7–22)
CALCIUM SERPL-MCNC: 9.2 MG/DL (ref 8.5–10.5)
CHLORIDE BLD-SCNC: 100 MEQ/L (ref 98–111)
CHOLESTEROL, TOTAL: 159 MG/DL (ref 100–199)
CO2: 30 MEQ/L (ref 23–33)
CREAT SERPL-MCNC: 0.7 MG/DL (ref 0.4–1.2)
GFR SERPL CREATININE-BSD FRML MDRD: 83 ML/MIN/1.73M2
GLUCOSE BLD-MCNC: 94 MG/DL (ref 70–108)
HDLC SERPL-MCNC: 58 MG/DL
LDL CHOLESTEROL CALCULATED: 87 MG/DL
POTASSIUM SERPL-SCNC: 5.1 MEQ/L (ref 3.5–5.2)
SODIUM BLD-SCNC: 141 MEQ/L (ref 135–145)
TOTAL PROTEIN: 7 G/DL (ref 6.1–8)
TRIGL SERPL-MCNC: 71 MG/DL (ref 0–199)

## 2021-12-01 ENCOUNTER — OFFICE VISIT (OUTPATIENT)
Dept: FAMILY MEDICINE CLINIC | Age: 66
End: 2021-12-01
Payer: MEDICARE

## 2021-12-01 VITALS
DIASTOLIC BLOOD PRESSURE: 66 MMHG | HEART RATE: 60 BPM | RESPIRATION RATE: 16 BRPM | BODY MASS INDEX: 37.99 KG/M2 | SYSTOLIC BLOOD PRESSURE: 122 MMHG | TEMPERATURE: 97.9 F | WEIGHT: 214.4 LBS | HEIGHT: 63 IN

## 2021-12-01 DIAGNOSIS — H61.21 EXCESSIVE CERUMEN IN RIGHT EAR CANAL: ICD-10-CM

## 2021-12-01 DIAGNOSIS — I10 ESSENTIAL HYPERTENSION: ICD-10-CM

## 2021-12-01 DIAGNOSIS — Z00.00 ROUTINE GENERAL MEDICAL EXAMINATION AT A HEALTH CARE FACILITY: Primary | ICD-10-CM

## 2021-12-01 DIAGNOSIS — M17.0 PRIMARY OSTEOARTHRITIS OF BOTH KNEES: ICD-10-CM

## 2021-12-01 PROCEDURE — G0439 PPPS, SUBSEQ VISIT: HCPCS | Performed by: FAMILY MEDICINE

## 2021-12-01 RX ORDER — LISINOPRIL 10 MG/1
10 TABLET ORAL DAILY
Qty: 90 TABLET | Refills: 3 | Status: SHIPPED | OUTPATIENT
Start: 2021-12-01 | End: 2022-10-12

## 2021-12-01 RX ORDER — NAPROXEN 250 MG/1
TABLET ORAL
Qty: 60 TABLET | Refills: 1 | Status: SHIPPED | OUTPATIENT
Start: 2021-12-01 | End: 2022-08-12

## 2021-12-01 RX ORDER — AMOXICILLIN 500 MG/1
CAPSULE ORAL PRN
COMMUNITY
Start: 2021-09-03 | End: 2021-12-27 | Stop reason: ALTCHOICE

## 2021-12-01 ASSESSMENT — LIFESTYLE VARIABLES
HOW OFTEN DO YOU HAVE SIX OR MORE DRINKS ON ONE OCCASION: 0
HOW OFTEN DURING THE LAST YEAR HAVE YOU FOUND THAT YOU WERE NOT ABLE TO STOP DRINKING ONCE YOU HAD STARTED: 0
HAS A RELATIVE, FRIEND, DOCTOR, OR ANOTHER HEALTH PROFESSIONAL EXPRESSED CONCERN ABOUT YOUR DRINKING OR SUGGESTED YOU CUT DOWN: 0
HOW OFTEN DURING THE LAST YEAR HAVE YOU BEEN UNABLE TO REMEMBER WHAT HAPPENED THE NIGHT BEFORE BECAUSE YOU HAD BEEN DRINKING: 0
HOW OFTEN DURING THE LAST YEAR HAVE YOU NEEDED AN ALCOHOLIC DRINK FIRST THING IN THE MORNING TO GET YOURSELF GOING AFTER A NIGHT OF HEAVY DRINKING: 0
AUDIT-C TOTAL SCORE: 2
AUDIT TOTAL SCORE: 2
HOW OFTEN DO YOU HAVE A DRINK CONTAINING ALCOHOL: 1
HAVE YOU OR SOMEONE ELSE BEEN INJURED AS A RESULT OF YOUR DRINKING: 0
HOW OFTEN DURING THE LAST YEAR HAVE YOU HAD A FEELING OF GUILT OR REMORSE AFTER DRINKING: 0
HOW MANY STANDARD DRINKS CONTAINING ALCOHOL DO YOU HAVE ON A TYPICAL DAY: 1
HOW OFTEN DURING THE LAST YEAR HAVE YOU FAILED TO DO WHAT WAS NORMALLY EXPECTED FROM YOU BECAUSE OF DRINKING: 0

## 2021-12-01 ASSESSMENT — PATIENT HEALTH QUESTIONNAIRE - PHQ9
SUM OF ALL RESPONSES TO PHQ QUESTIONS 1-9: 0
SUM OF ALL RESPONSES TO PHQ QUESTIONS 1-9: 0
SUM OF ALL RESPONSES TO PHQ9 QUESTIONS 1 & 2: 0
1. LITTLE INTEREST OR PLEASURE IN DOING THINGS: 0
SUM OF ALL RESPONSES TO PHQ QUESTIONS 1-9: 0
2. FEELING DOWN, DEPRESSED OR HOPELESS: 0

## 2021-12-01 NOTE — PATIENT INSTRUCTIONS
Personalized Preventive Plan for [facility] - 12/1/2021  Medicare offers a range of preventive health benefits. Some of the tests and screenings are paid in full while other may be subject to a deductible, co-insurance, and/or copay. Some of these benefits include a comprehensive review of your medical history including lifestyle, illnesses that may run in your family, and various assessments and screenings as appropriate. After reviewing your medical record and screening and assessments performed today your provider may have ordered immunizations, labs, imaging, and/or referrals for you. A list of these orders (if applicable) as well as your Preventive Care list are included within your After Visit Summary for your review. Other Preventive Recommendations:    · A preventive eye exam performed by an eye specialist is recommended every 1-2 years to screen for glaucoma; cataracts, macular degeneration, and other eye disorders. · A preventive dental visit is recommended every 6 months. · Try to get at least 150 minutes of exercise per week or 10,000 steps per day on a pedometer . · Order or download the FREE \"Exercise & Physical Activity: Your Everyday Guide\" from [employer]. Call 7-413.548.1446 or search [employer] online. · You need 8952-3080 mg of calcium and 2750-4507 IU of vitamin D per day. It is possible to meet your calcium requirement with diet alone, but a vitamin D supplement is usually necessary to meet this goal.  · When exposed to the sun, use a sunscreen that protects against both UVA and UVB radiation with an SPF of 30 or greater. Reapply every 2 to 3 hours or after sweating, drying off with a towel, or swimming. · Always wear a seat belt when traveling in a car. Always wear a helmet when riding a bicycle or motorcycle.

## 2021-12-01 NOTE — PROGRESS NOTES
Per orders of CG I performed a right ear flush with warm water and cerumen spoon. I obtained a moderated amount of ear wax from the ear canal, TM visualized as pearly and white. Patient tolerated well.

## 2021-12-27 ENCOUNTER — VIRTUAL VISIT (OUTPATIENT)
Dept: FAMILY MEDICINE CLINIC | Age: 66
End: 2021-12-27
Payer: MEDICARE

## 2021-12-27 ENCOUNTER — NURSE TRIAGE (OUTPATIENT)
Dept: OTHER | Facility: CLINIC | Age: 66
End: 2021-12-27

## 2021-12-27 DIAGNOSIS — R06.2 WHEEZING: Primary | ICD-10-CM

## 2021-12-27 DIAGNOSIS — R06.02 SOB (SHORTNESS OF BREATH): ICD-10-CM

## 2021-12-27 DIAGNOSIS — R05.9 COUGH: ICD-10-CM

## 2021-12-27 DIAGNOSIS — Z72.0 TOBACCO ABUSE: ICD-10-CM

## 2021-12-27 PROCEDURE — 99213 OFFICE O/P EST LOW 20 MIN: CPT | Performed by: NURSE PRACTITIONER

## 2021-12-27 RX ORDER — PREDNISONE 20 MG/1
20 TABLET ORAL 2 TIMES DAILY
Qty: 10 TABLET | Refills: 0 | Status: SHIPPED | OUTPATIENT
Start: 2021-12-27 | End: 2022-01-01

## 2021-12-27 RX ORDER — AZITHROMYCIN 250 MG/1
250 TABLET, FILM COATED ORAL SEE ADMIN INSTRUCTIONS
Qty: 6 TABLET | Refills: 0 | Status: SHIPPED | OUTPATIENT
Start: 2021-12-27 | End: 2022-01-01

## 2021-12-27 ASSESSMENT — ENCOUNTER SYMPTOMS
WHEEZING: 1
CHEST TIGHTNESS: 1
VOMITING: 0
CONSTIPATION: 0
DIARRHEA: 0
COUGH: 1
RHINORRHEA: 1
NAUSEA: 0
BLOOD IN STOOL: 0
SHORTNESS OF BREATH: 1

## 2021-12-27 NOTE — PATIENT INSTRUCTIONS
Patient Education        Wheezing or Bronchoconstriction: Care Instructions  Your Care Instructions  Wheezing is a whistling noise made during breathing. It occurs when the small airways, or bronchial tubes, that lead to your lungs swell or contract (spasm) and become narrow. This narrowing is called bronchoconstriction. When your airways constrict, it is hard for air to pass through and this makes it hard for you to breathe. Wheezing and bronchoconstriction can be caused by many problems, including:  · An infection such as the flu or a cold. · Allergies such as hay fever. · Diseases such as asthma or chronic obstructive pulmonary disease. · Smoking. Treatment for your wheezing depends on what is causing the problem. Your wheezing may get better without treatment. But you may need to pay attention to things that cause your wheezing and avoid them. Or you may need medicine to help treat the wheezing and to reduce the swelling or to relieve spasms in your lungs. Follow-up care is a key part of your treatment and safety. Be sure to make and go to all appointments, and call your doctor if you are having problems. It is also a good idea to know your test results and keep a list of the medicines you take. How can you care for yourself at home? · Take your medicine exactly as prescribed. Call your doctor if you think you are having a problem with your medicine. You will get more details on the specific medicine your doctor prescribes. · If your doctor prescribed antibiotics, take them as directed. Do not stop taking them just because you feel better. You need to take the full course of antibiotics. · Breathe moist air from a humidifier, hot shower, or sink filled with hot water. This may help ease your symptoms and make it easier for you to breathe. · If you have congestion in your nose and throat, drinking plenty of fluids, especially hot fluids, may help relieve your symptoms.  If you have kidney, heart, or liver disease and have to limit fluids, talk with your doctor before you increase the amount of fluids you drink. · If you have mucus in your airways, it may help to breathe deeply and cough. · Do not smoke or allow others to smoke around you. Smoking can make your wheezing worse. If you need help quitting, talk to your doctor about stop-smoking programs and medicines. These can increase your chances of quitting for good. · Avoid things that may cause your wheezing. These may include colds, smoke, air pollution, dust, pollen, pets, cockroaches, stress, and cold air. When should you call for help? Call 911 anytime you think you may need emergency care. For example, call if:    · You have severe trouble breathing.     · You passed out (lost consciousness). Call your doctor now or seek immediate medical care if:    · You cough up yellow, dark brown, or bloody mucus (sputum).     · You have new or worse shortness of breath.     · Your wheezing is not getting better or it gets worse after you start taking your medicine. Watch closely for changes in your health, and be sure to contact your doctor if:    · You do not get better as expected. Where can you learn more? Go to https://[employer]weetTVpeAlorum[employer]Live On [employer] Go[employer]. org and sign in to your [employer] account. Enter [id number] in the [facility] box to learn more about \"Wheezing or Bronchoconstriction: Care Instructions. \"     If you do not have an account, please click on the \"Sign Up Now\" link. Current as of: July 6, 2021               Content Version: 13.1  © 2006-2021 [employer]. Care instructions adapted under license by [facility] ([facility]). If you have questions about a medical condition or this instruction, always ask your healthcare professional. [place] [address] any warranty or liability for your use of this information.

## 2021-12-27 NOTE — TELEPHONE ENCOUNTER
Received call from Mat Roca at [facility] with [employer]. Subjective: Caller states \"has a cough, ears popping and sob\"     Current Symptoms: Pt with cough, always has a cough but is worse than normal. Sob is worse than her piotr, gets sob when up walking aroundl. Will cough up a thick yellow mucus. Nose is congested. Ears are popping at times. Home covid test is negative yesterday. Slight headache. Pulse ox ranging 95-97%. Onset: 4 days ago;    Associated Symptoms:     Pain Severity: 0/10 ; Temperature: no fever     What has been tried: using inhalers about every 4 hours, allegra    LMP: NA Pregnant: NA    Recommended disposition: go to office now. Care advice provided, patient verbalizes understanding; denies any other questions or concerns; instructed to call back for any new or worsening symptoms. Armin Ham at [facility] calling patient to schedule     Attention Provider: Thank you for allowing me to participate in the care of your patient. The patient was connected to triage in response to information provided to the ECC/PSC. Please do not respond through this encounter as the response is not directed to a shared pool.         Reason for Disposition   MILD difficulty breathing (e.g., minimal/no SOB at rest, SOB with walking, pulse < 100) of new-onset or worse than normal    Protocols used: BREATHING DIFFICULTY-ADULT-OH

## 2021-12-27 NOTE — PROGRESS NOTES
[facility]  [facility]  Dept: [phone]  Dept Fax: [phone]      TELEHEALTH EVALUATION -- Audio/Visual (During YMLCV-44 public health emergency)  This visit was performed via a synchronous telecommunication system. Pt location: Home  Provider location:  Office ([address] [place] [address])  Pt notified that this was a virtual visit and that we will submit a claim for reimbursement to their insurance company. They were made aware that any copays, coinsurance amounts, or other amounts not covered will be their responsibility. Pt accepted? yes    SUBJECTIVE     Zoey A Norm Speaker is a 74 y.o. female    Pt presents for sob, ears popping, increased mucous production, run down, wheezing, nausea off and on, headache, sinus pressure starting slightly about 1 week ago and then worsened Thursday. Denies body aches or chills. Home covid test is negative per patient. She is using flonase, neti pot with minimal relief. She takes allegra daily. No known ill contacts. Pulse ox has been greater than 93%.      Patient Active Problem List   Diagnosis    Tobacco abuse    Class 3 obesity due to excess calories without serious comorbidity with body mass index (BMI) of 40.0 to 44.9 in adult    Essential hypertension       Current Outpatient Medications   Medication Sig Dispense Refill    azithromycin (ZITHROMAX) 250 MG tablet Take 1 tablet by mouth See Admin Instructions for 5 days 500mg on day 1 followed by 250mg on days 2 - 5 6 tablet 0    predniSONE (DELTASONE) 20 MG tablet Take 1 tablet by mouth 2 times daily for 5 days 10 tablet 0    lisinopril (PRINIVIL;ZESTRIL) 10 MG tablet Take 1 tablet by mouth daily 90 tablet 3    naproxen (NAPROSYN) 250 MG tablet TAKE 1 TABLET BY MOUTH TWICE DAILY AS NEEDED FOR PAIN 60 tablet 1    albuterol sulfate HFA (PROAIR HFA) 108 (90 Base) MCG/ACT inhaler Inhale 2 puffs into the lungs every 4-6 hours as needed for wheezing 3 Inhaler 3    beclomethasone (QVAR REDIHALER) 40 MCG/ACT AERB inhaler Inhale 1 puff into the lungs 2 times daily (Patient not taking: Reported on 12/1/2021) 3 Inhaler 3    Calcium Citrate-Vitamin D (CALCIUM + D PO) Take by mouth daily      UNABLE TO FIND Occuvite 1 QD      fexofenadine (ALLEGRA) 180 MG tablet Take 180 mg by mouth daily. No current facility-administered medications for this visit. Review of Systems   Constitutional: Positive for activity change, appetite change and fatigue. Negative for chills, diaphoresis and fever. HENT: Positive for congestion and rhinorrhea. Respiratory: Positive for cough, chest tightness, shortness of breath and wheezing. Cardiovascular: Negative for chest pain, palpitations and leg swelling. Gastrointestinal: Negative for blood in stool, constipation, diarrhea, nausea and vomiting. Genitourinary: Negative for dysuria and hematuria. Musculoskeletal: Negative for myalgias. Neurological: Negative for dizziness and headaches. All other systems reviewed and are negative. OBJECTIVE     Due to this being a TeleHealth encounter, evaluation of the following organ systems is limited: Vitals/Constitutional/EENT/Resp/CV/GI//MS/Neuro/Skin/Heme-Lymph-Imm. PHYSICAL EXAMINATION:  [ INSTRUCTIONS:  \"[x]\" Indicates a positive item  \"[]\" Indicates a negative item  -- DELETE ALL ITEMS NOT EXAMINED]  Vital Signs: (All Vital Signs are pt reported, unless otherwise noted)   There were no vitals taken for this visit. Constitutional: [x] Appears well-developed and well-nourished [x] No apparent distress      [] Abnormal-   Mental status  [x] Alert and awake  [x] Oriented to person/place/time [x]Able to follow commands      Eyes:  EOM    [x]  Normal  [] Abnormal-  Sclera  [x]  Normal  [] Abnormal -         Discharge [x]  None visible  [] Abnormal -    HENT:   [x] Normocephalic, atraumatic.   [] Abnormal   [x] Mouth/Throat: Mucous membranes are moist.     External Ears [x] Normal  [] Abnormal-     Neck: [x] No visualized mass     Pulmonary/Chest: [x] Respiratory effort normal.  [x] No visualized signs of difficulty breathing or respiratory distress        [] Abnormal-      Musculoskeletal:   [x] Normal gait with no signs of ataxia         [x] Normal range of motion of neck        [] Abnormal-       Neurological:        [x] No Facial Asymmetry (Cranial nerve 7 motor function) (limited exam to video visit)          [x] No gaze palsy        [] Abnormal-         Skin:        [x] No significant exanthematous lesions or discoloration noted on facial skin         [] Abnormal-            Psychiatric:       [x] Normal Affect [x] No Hallucinations        [] Abnormal-       No results found for: LABA1C  No results found for: EAG    Lab Results   Component Value Date    CHOL 159 11/22/2021    TRIG 71 11/22/2021    HDL 58 11/22/2021    LDLCALC 87 11/22/2021       Lab Results   Component Value Date     11/22/2021    K 5.1 11/22/2021     11/22/2021    CO2 30 11/22/2021    BUN 11 11/22/2021    CREATININE 0.7 11/22/2021    GLUCOSE 94 11/22/2021    CALCIUM 9.2 11/22/2021    PROT 7.0 11/22/2021    LABALBU 4.3 11/22/2021    BILITOT 0.4 11/22/2021    ALKPHOS 102 11/22/2021    AST 15 11/22/2021    ALT <5 (L) 11/22/2021    LABGLOM 83 (A) 11/22/2021       No results found for: LABMICR, [id number]    Lab Results   Component Value Date    TSH 1.920 06/18/2019    T4FREE 1.26 06/18/2019       Lab Results   Component Value Date    WBC 8.2 02/05/2018    HGB 14.2 02/05/2018    HCT 44.8 02/05/2018    MCV 89.8 02/05/2018     02/05/2018       No results found for: PSA, PSADIA      Immunization History   Administered Date(s) Administered    COVID-19, Moderna, Booster, PF, 50mcg/0.25ml 10/27/2021    COVID-19, Moderna, Primary or Immunocompromised, PF, 100mcg/0.5mL 02/17/2021, 03/17/2021    Pneumococcal Conjugate 13-valent ([id number]) 11/29/2018    Pneumococcal Polysaccharide ([id number]) 08/01/2009, 01/03/2020    Tdap (Boostrix, Adacel) 09/26/2011       Health Maintenance   Topic Date Due    Shingles Vaccine (1 of 2) Never done    Low dose CT lung screening  Never done    DEXA (modify frequency per FRAX score)  Never done    DTaP/Tdap/Td vaccine (2 - Td or Tdap) 09/26/2021    Hepatitis C screen  06/20/2022 (Originally 1955)    Flu vaccine (1) 12/01/2022 (Originally 9/1/2021)    Potassium monitoring  11/22/2022    Creatinine monitoring  11/22/2022    Annual Wellness Visit (AWV)  12/02/2022    Breast cancer screen  09/15/2023    Colon cancer screen colonoscopy  08/01/2024    Lipid screen  11/22/2026    Pneumococcal 65+ years Vaccine  Completed    COVID-19 Vaccine  Completed    Hepatitis A vaccine  Aged Out    Hepatitis B vaccine  Aged Out    Hib vaccine  Aged Out    Meningococcal (ACWY) vaccine  Aged Out         No future appointments. ASSESSMENT       Diagnosis Orders   1. Wheezing  azithromycin (ZITHROMAX) 250 MG tablet    predniSONE (DELTASONE) 20 MG tablet   2. SOB (shortness of breath)  azithromycin (ZITHROMAX) 250 MG tablet    predniSONE (DELTASONE) 20 MG tablet   3. Cough  azithromycin (ZITHROMAX) 250 MG tablet   4. Tobacco abuse         PLAN     Azithromycin and prednisone sent to pharmacy  Increase fluids and rest  RTO if symptoms worsen or stay the same    19}    Pursuant to the emergency declaration under the [facility], [facility] waiver authority and the [employer] Act, this Virtual  Visit was conducted, with patient's consent, to reduce the patient's risk of exposure to COVID-19 and provide continuity of care for an established patient. Services were provided through a video synchronous discussion virtually to substitute for in-person clinic visit.        Electronically signed by GAETANO Kumar CNP on 12/27/2021 at 1:21 PM    Greater than 20 minutes was spent in contact with patient with greater than 50% in counseling and coordination of care.

## 2021-12-28 ENCOUNTER — PATIENT MESSAGE (OUTPATIENT)
Dept: FAMILY MEDICINE CLINIC | Age: 66
End: 2021-12-28

## 2021-12-28 NOTE — TELEPHONE ENCOUNTER
From: Zoey Mcdaniel  To: Jakob Baker  Sent: 12/28/2021 3:01 PM EST  Subject: Yesterdays visitt    I want to thank you so much. I am able to walk around today with very little SOB. Pulse ox reading with nasal breathing is 95%-96% and went I walk around at a regular gate it remains there. Still have my cough, hasn't improved much. Still coughing up mucus but color is more clear and goes from thick to thin, depending time of day. Very little yellow to it. I am able to get my laundry done today. Again thank you I am able to get to my eye doctor on Thursday which is very important to me.   Everton Rojas

## 2021-12-29 ENCOUNTER — PATIENT MESSAGE (OUTPATIENT)
Dept: FAMILY MEDICINE CLINIC | Age: 66
End: 2021-12-29

## 2021-12-29 NOTE — TELEPHONE ENCOUNTER
From: Zoey Mcdaniel  To: Estrellita Carrizales  Sent: 12/29/2021 1:59 PM EST  Subject: [place]    Last night my cough turned into a wors3 cough. Wasn't able to sleep at all. To day it's intermittent, but a night gets worse. Tried halls cough drops and today got cepacol throat and cough drops with very little relief. Breathing is OK and very thing else is OK. Just can't get the cough to ease up.

## 2021-12-30 RX ORDER — DEXTROMETHORPHAN HYDROBROMIDE AND PROMETHAZINE HYDROCHLORIDE 15; 6.25 MG/5ML; MG/5ML
5 SYRUP ORAL 4 TIMES DAILY PRN
Qty: 118 ML | Refills: 0 | Status: SHIPPED | OUTPATIENT
Start: 2021-12-30 | End: 2022-01-06

## 2022-01-04 ENCOUNTER — HOSPITAL ENCOUNTER (OUTPATIENT)
Age: 67
Discharge: HOME | End: 2022-01-04
Payer: MEDICARE

## 2022-01-04 ENCOUNTER — HOSPITAL ENCOUNTER (OUTPATIENT)
Dept: GENERAL RADIOLOGY | Age: 67
Discharge: HOME | End: 2022-01-04
Payer: MEDICARE

## 2022-01-04 DIAGNOSIS — Z01.818 PREOP TESTING: ICD-10-CM

## 2022-01-04 DIAGNOSIS — D48.5 NEOPLASM OF UNCERTAIN BEHAVIOR OF SKIN: ICD-10-CM

## 2022-01-04 LAB
ERYTHROCYTE [DISTWIDTH] IN BLOOD BY AUTOMATED COUNT: 14 % (ref 11.5–14.5)
ERYTHROCYTE [DISTWIDTH] IN BLOOD BY AUTOMATED COUNT: 49.7 FL (ref 35–45)
HCT VFR BLD CALC: 51.8 % (ref 37–47)
HEMOGLOBIN: 16 GM/DL (ref 12–16)
MCH RBC QN AUTO: 30 PG (ref 26–33)
MCHC RBC AUTO-ENTMCNC: 30.9 GM/DL (ref 32.2–35.5)
MCV RBC AUTO: 97.2 FL (ref 81–99)
PLATELET # BLD: 304 THOU/MM3 (ref 130–400)
PMV BLD AUTO: 9.7 FL (ref 9.4–12.4)
RBC # BLD: 5.33 MILL/MM3 (ref 4.2–5.4)
WBC # BLD: 10.6 THOU/MM3 (ref 4.8–10.8)

## 2022-01-04 PROCEDURE — 71046 X-RAY EXAM CHEST 2 VIEWS: CPT

## 2022-01-04 PROCEDURE — 85027 COMPLETE CBC AUTOMATED: CPT

## 2022-01-04 PROCEDURE — 93005 ELECTROCARDIOGRAM TRACING: CPT | Performed by: SPECIALIST

## 2022-01-04 PROCEDURE — 36415 COLL VENOUS BLD VENIPUNCTURE: CPT

## 2022-01-05 LAB
EKG ATRIAL RATE: 57 BPM
EKG P AXIS: 80 DEGREES
EKG P-R INTERVAL: 118 MS
EKG Q-T INTERVAL: 432 MS
EKG QRS DURATION: 86 MS
EKG QTC CALCULATION (BAZETT): 420 MS
EKG R AXIS: -5 DEGREES
EKG T AXIS: 4 DEGREES
EKG VENTRICULAR RATE: 57 BPM

## 2022-01-05 PROCEDURE — 93010 ELECTROCARDIOGRAM REPORT: CPT | Performed by: INTERNAL MEDICINE

## 2022-01-12 NOTE — PROGRESS NOTES
Covid screening questionnaire complete and negative for symptoms or exposure see chart for documentation.   Please notify your doctor if you develop any signs of illness  Please wear a mask when you come to the hospital    NPO after midnight  Bring insurance info and drivers license  Wear comfortable clean clothing  Do not bring jewelry  Shower night before and morning of surgery with a liquid antibacterial soap  Bring list of medications with dosage and how often taken  Follow all instructions given by your physician   needed at discharge  No visitors allowed in with patient at this time  Please bring and wear mask  Call [phone] for any questions

## 2022-01-12 NOTE — PROGRESS NOTES
In preparation for their surgical procedure above patient was screened for Obstructive Sleep Apnea (NEAL) using the STOP-Bang Questionnaire by the Pre-Admission Testing department. This is a pre-surgical screening tool for patient safety and serves as a recommendation, this WILL NOT cause cancellation of surgery. STOP-Bang Questionnaire  * Do you currently see a pulmonologist?  No     If yes STOP, do not complete. Patient follows with Dr.     1.  Do you snore loudly (able to be heard in the next room)? No    2. Do you often feel tired or sleepy during the daytime? No       3. Has anyone ever told you that you stop breathing during your sleep? No    4. Do you have or are you being treated for high blood pressure? Yes      5. BMI more than 35? BMI (Calculated): 37.9        Yes    6. Age over 48 years? 74 y.o. Yes    7. Neck Circumference greater than 17 inches for male or 16 inches for female? Measured           (visits only)            Not Applicable    8. Gender Male? No      TOTAL SCORE: 3    NEAL - Low Risk : Yes to 0 - 2 questions  NEAL - Intermediate Risk : Yes to 3 - 4 questions  NEAL - High Risk : Yes to 5 - 8 questions    Adapted from:   STOP Questionnaire: A Tool to Screen Patients for Obstructive Sleep Apnea   LOULOU Anderson.P.C., Francine Holman M.B.B.S., Sharla Schmidt M.D., Nitesh Frye, Ph.D., MARILYN Kee.B.B.S., Leona Co, M.Sc., Cielo Jameson M.D., He Casanova. LOULOU Martinez.P.C.    Anesthesiology 2008; 239:498-23 Copyright 2008, the [address] [employer], [facility].   ----------------------------------------------------------------------------------------------------------------

## 2022-01-19 ENCOUNTER — HOSPITAL ENCOUNTER (OUTPATIENT)
Age: 67
Setting detail: OUTPATIENT SURGERY
Discharge: HOME | End: 2022-01-19
Attending: SPECIALIST | Admitting: SPECIALIST
Payer: MEDICARE

## 2022-01-19 ENCOUNTER — ANESTHESIA (OUTPATIENT)
Dept: OPERATING ROOM | Age: 67
End: 2022-01-19
Payer: MEDICARE

## 2022-01-19 ENCOUNTER — ANESTHESIA EVENT (OUTPATIENT)
Dept: OPERATING ROOM | Age: 67
End: 2022-01-19
Payer: MEDICARE

## 2022-01-19 VITALS
RESPIRATION RATE: 23 BRPM | DIASTOLIC BLOOD PRESSURE: 52 MMHG | OXYGEN SATURATION: 100 % | SYSTOLIC BLOOD PRESSURE: 97 MMHG

## 2022-01-19 VITALS
WEIGHT: 215.8 LBS | HEART RATE: 61 BPM | HEIGHT: 63 IN | DIASTOLIC BLOOD PRESSURE: 57 MMHG | TEMPERATURE: 96.8 F | OXYGEN SATURATION: 95 % | BODY MASS INDEX: 38.24 KG/M2 | RESPIRATION RATE: 16 BRPM | SYSTOLIC BLOOD PRESSURE: 101 MMHG

## 2022-01-19 PROCEDURE — 2580000003 HC RX 258: Performed by: SPECIALIST

## 2022-01-19 PROCEDURE — 7100000010 HC PHASE II RECOVERY - FIRST 15 MIN: Performed by: SPECIALIST

## 2022-01-19 PROCEDURE — 88304 TISSUE EXAM BY PATHOLOGIST: CPT

## 2022-01-19 PROCEDURE — 6360000002 HC RX W HCPCS: Performed by: SPECIALIST

## 2022-01-19 PROCEDURE — 7100000011 HC PHASE II RECOVERY - ADDTL 15 MIN: Performed by: SPECIALIST

## 2022-01-19 PROCEDURE — 6360000002 HC RX W HCPCS: Performed by: NURSE ANESTHETIST, CERTIFIED REGISTERED

## 2022-01-19 PROCEDURE — 3600000012 HC SURGERY LEVEL 2 ADDTL 15MIN: Performed by: SPECIALIST

## 2022-01-19 PROCEDURE — 2500000003 HC RX 250 WO HCPCS: Performed by: SPECIALIST

## 2022-01-19 PROCEDURE — 3700000000 HC ANESTHESIA ATTENDED CARE: Performed by: SPECIALIST

## 2022-01-19 PROCEDURE — 3600000002 HC SURGERY LEVEL 2 BASE: Performed by: SPECIALIST

## 2022-01-19 PROCEDURE — 2709999900 HC NON-CHARGEABLE SUPPLY: Performed by: SPECIALIST

## 2022-01-19 PROCEDURE — 3700000001 HC ADD 15 MINUTES (ANESTHESIA): Performed by: SPECIALIST

## 2022-01-19 RX ORDER — SODIUM CHLORIDE 9 MG/ML
INJECTION, SOLUTION INTRAVENOUS CONTINUOUS
Status: DISCONTINUED | OUTPATIENT
Start: 2022-01-19 | End: 2022-01-19 | Stop reason: HOSPADM

## 2022-01-19 RX ORDER — PROPOFOL 10 MG/ML
INJECTION, EMULSION INTRAVENOUS PRN
Status: DISCONTINUED | OUTPATIENT
Start: 2022-01-19 | End: 2022-01-19 | Stop reason: SDUPTHER

## 2022-01-19 RX ORDER — CEFAZOLIN SODIUM 2 G/100ML
2000 INJECTION, SOLUTION INTRAVENOUS ONCE
Status: COMPLETED | OUTPATIENT
Start: 2022-01-19 | End: 2022-01-19

## 2022-01-19 RX ORDER — LIDOCAINE HYDROCHLORIDE 10 MG/ML
INJECTION, SOLUTION INFILTRATION; PERINEURAL PRN
Status: DISCONTINUED | OUTPATIENT
Start: 2022-01-19 | End: 2022-01-19 | Stop reason: ALTCHOICE

## 2022-01-19 RX ORDER — LIDOCAINE HYDROCHLORIDE AND EPINEPHRINE 10; 10 MG/ML; UG/ML
INJECTION, SOLUTION INFILTRATION; PERINEURAL PRN
Status: DISCONTINUED | OUTPATIENT
Start: 2022-01-19 | End: 2022-01-19 | Stop reason: ALTCHOICE

## 2022-01-19 RX ADMIN — PROPOFOL 30 MG: 10 INJECTION, EMULSION INTRAVENOUS at 11:42

## 2022-01-19 RX ADMIN — PROPOFOL 50 MG: 10 INJECTION, EMULSION INTRAVENOUS at 10:48

## 2022-01-19 RX ADMIN — PROPOFOL 20 MG: 10 INJECTION, EMULSION INTRAVENOUS at 11:33

## 2022-01-19 RX ADMIN — SODIUM CHLORIDE: 9 INJECTION, SOLUTION INTRAVENOUS at 10:45

## 2022-01-19 RX ADMIN — CEFAZOLIN SODIUM 2000 MG: 2 INJECTION, SOLUTION INTRAVENOUS at 10:48

## 2022-01-19 RX ADMIN — PROPOFOL 20 MG: 10 INJECTION, EMULSION INTRAVENOUS at 11:30

## 2022-01-19 RX ADMIN — PROPOFOL 40 MG: 10 INJECTION, EMULSION INTRAVENOUS at 11:37

## 2022-01-19 RX ADMIN — PROPOFOL 50 MG: 10 INJECTION, EMULSION INTRAVENOUS at 10:53

## 2022-01-19 ASSESSMENT — PULMONARY FUNCTION TESTS
PIF_VALUE: 0
PIF_VALUE: 14
PIF_VALUE: 23
PIF_VALUE: 0
PIF_VALUE: 13
PIF_VALUE: 0

## 2022-01-19 ASSESSMENT — PAIN - FUNCTIONAL ASSESSMENT: PAIN_FUNCTIONAL_ASSESSMENT: 0-10

## 2022-01-19 ASSESSMENT — PAIN SCALES - GENERAL: PAINLEVEL_OUTOF10: 0

## 2022-01-19 NOTE — OP NOTE
Operative Note    Patient name: Nancy Locke             Medical Record Number: [id number]    Primary Care Physician: Jovi Randolph MD     1955    Date of Procedure: 2022    Pre-operative Diagnosis: 8mm painful cystic mass of right great toe    Post-operative Diagnosis: Mucous cyst of right great toe and 1cm2 defect of right great toe skin    Procedure Performed: Excision of right great toe mucous cyst creating a 1cm2 defect that was repaired with a full thickness skin graft (1 cm2) (CPT 81528 & 39676)    Surgeons/Assistants: MD Ambika Cazares PA-C    Estimated Blood Loss: 5ml     Complications: none immediately appreciated    Procedure: With the patient lying in the supine position and under adequate anesthesia per the anesthesia team.   Local anesthesia consisting of 11 ml of 1% Lidocaine 1:100,000 with epinephrine solution of the donor site of the right inguinal area as well as 5ml of 1% plain Lidocaine of the right great toe. The area was prepped and draped in the standard surgical fashion. The cyst was excised and noted to have a ganglion tract to the proximal IP joint with arthritic osteophytes noted. The entire mucous cyst with ganglion extension and the osteophytes were removed. The capsule was repaired with 4-0 Monocryl suture placed in simple interrupted fashion. The patient has very thin skin and a 1cm2 cuticle/distal dorsal skin defect which could not be closed primarily, therefore after the proximal incision was closed with 4-0 nylon placed in simple fashion a full thickness skin graft was taken. It was defatted and secured in position with a 3-0 silk suture tie and then a 5-0 fast absorbable suture was placed in a simple running fashion. A Bacitracin Xeroform and moist cotton ball tie over bolster was secured using the tails of the silk sutures and 4-0 nylon suture.   The donor site was closed with a 3-0 Monocryl suture placed in interrupted buried fashion and then Histoacryl respectively. The patient tolerated the procedure well and remained hemodynamically stable throughout the procedure and was quite comfortable throughout the operative course.     Clinical staging for cancer cases:  Ct  Cn  Cm    Marguerite Burroughs MD  Electronically signed by me on 1/19/2022 at 11:55 AM  Operative Note      Patient: Tori Lake  YOB: 1955  MRN: [id number]    Date of Procedure: 1/19/2022    Pre-Op Diagnosis: CYSTIC MASS RIGHT GREAT TOE    Post-Op Diagnosis: Same       Procedure(s):  EXCISION CYSTIC MASS RIGHT GREAT TOE,  SKIN GRAFT FROM RIGHT UPPER THIGH    Surgeon(s):  Marguerite Burroughs MD    Assistant:   Physician Assistant: Tyree Franco PA-C    Anesthesia: Monitor Anesthesia Care    Estimated Blood Loss (mL): Minimal    Complications: None    Specimens:   ID Type Source Tests Collected by Time Destination   A :  Tissue Foot SURGICAL PATHOLOGY Marguerite Burroughs MD 1/19/2022 1115        Implants:  * No implants in log *      Drains: * No LDAs found *    Findings: 8mm painful cystic mass of right great toe    Detailed Description of Procedure:   Excision of right great toe mucous cyst creating a 1cm2 defect that was repaired with a full thickness skin graft (1 cm2) (CPT 18872 & 91067)    Electronically signed by Marguerite Burroughs MD on 1/19/2022 at 11:55 AM

## 2022-01-19 NOTE — ANESTHESIA POSTPROCEDURE EVALUATION
Department of Anesthesiology  Postprocedure Note    Patient: Sandro Chaudhari  MRN: [id number]  YOB: 1955  Date of evaluation: 1/19/2022  Time:  1:32 PM     Procedure Summary     Date: 01/19/22 Room / Location: [facility] 04 / [facility]    Anesthesia Start: 0696 Anesthesia Stop: 7483    Procedure: EXCISION CYSTIC MASS RIGHT GREAT TOE,  SKIN GRAFT FROM RIGHT UPPER THIGH (Right Foot) Diagnosis: (CYSTIC MASS RIGHT GREAT TOE)    Surgeons: Edelmira Burgos MD Responsible Provider: Logan Clark MD    Anesthesia Type: MAC ASA Status: 2          Anesthesia Type: MAC    Sonny Phase I:      Sonny Phase II: Sonny Score: 10    Last vitals: Reviewed and per EMR flowsheets.        Anesthesia Post Evaluation

## 2022-01-19 NOTE — ANESTHESIA PRE PROCEDURE
Department of Anesthesiology  Preprocedure Note       Name:  Aly Caba   Age:  74 y.o.  :  1955                                          MRN:  [id number]         Date:  2022      Surgeon: Jocelyn Lara):  Aayush Benton MD    Procedure: Procedure(s):  EXCISION CYSTIC MASS RIGHT GREAT TOE, POSSIBLE SKIN GRAFT    Medications prior to admission:   Prior to Admission medications    Medication Sig Start Date End Date Taking? Authorizing Provider   Multiple Vitamins-Minerals (OCUVITE PO) Take by mouth daily   Yes Historical Provider, MD   lisinopril (PRINIVIL;ZESTRIL) 10 MG tablet Take 1 tablet by mouth daily 21  Yes Tony De Jesus MD   naproxen (NAPROSYN) 250 MG tablet TAKE 1 TABLET BY MOUTH TWICE DAILY AS NEEDED FOR PAIN 21  Yes Tony De Jesus MD   albuterol sulfate HFA (PROAIR HFA) 108 (90 Base) MCG/ACT inhaler Inhale 2 puffs into the lungs every 4-6 hours as needed for wheezing 21  Yes Tony De Jesus MD   Calcium Citrate-Vitamin D (CALCIUM + D PO) Take by mouth daily   Yes Historical Provider, MD   fexofenadine (ALLEGRA) 180 MG tablet Take 180 mg by mouth daily. Yes Historical Provider, MD       Current medications:    Current Facility-Administered Medications   Medication Dose Route Frequency Provider Last Rate Last Admin    0.9 % sodium chloride infusion   IntraVENous Continuous Aayush Benton MD        ceFAZolin (ANCEF) 2000 mg in dextrose 4 % 100 mL IVPB (premix)  2,000 mg IntraVENous Once Aayush Benton MD           Allergies: Allergies   Allergen Reactions    Flonase [Fluticasone Propionate] Other (See Comments)     Causes Epistaxis    Mucinex D [Pseudoephedrine-Guaifenesin Er] Other (See Comments)     Doesn't work, makes congestion worse.          Problem List:    Patient Active Problem List   Diagnosis Code    Tobacco abuse Z72.0    Class 3 obesity due to excess calories without serious comorbidity with body mass index (BMI) of 40.0 to 44.9 in adult [id number]    Essential hypertension I10       Past Medical History:        Diagnosis Date    Asthma     Chronic back pain     COPD (chronic obstructive pulmonary disease) (HCC)     Diverticulosis     Essential hypertension 11/5/2019    Hx of blood clots 1970    leg    Obesity     Osteoarthritis     PONV (postoperative nausea and vomiting)     with knee replacement only and patient thinks that was from the nerve block    Tobacco abuse        Past Surgical History:        Procedure Laterality Date    APPENDECTOMY  1990's    CHOLECYSTECTOMY  1990's    COLONOSCOPY  2008    Dr. Ismael Maldonado Right 2021    Had a cyst removed from her right big toe that she will need to have another surgery to fix    FRACTURE SURGERY Left 10/08/2016    femur fracture--done [address] [place] [address]  2008    JOINT REPLACEMENT  2016    Right total knee    KNEE ARTHROSCOPY  1998    left knee    KNEE ARTHROSCOPY Right 9/30/15,12/9/15    Dr. Kaylynn Hammer Right 05/23/2016    TONSILLECTOMY AND ADENOIDECTOMY  1980's       Social History:    Social History     Tobacco Use    Smoking status: Current Every Day Smoker     Packs/day: 0.75     Years: 40.00     Pack years: 30.00     Types: Cigarettes    Smokeless tobacco: Never Used    Tobacco comment: 9-12 cigarettes per day, trying to cut back   Substance Use Topics    Alcohol use: Yes     Comment: rarely                                Ready to quit: Not Answered  Counseling given: Not Answered  Comment: 9-12 cigarettes per day, trying to cut back      Vital Signs (Current):   Vitals:    01/12/22 1518 01/19/22 0938   BP:  135/89   Pulse:  66   Resp:  16   Temp:  97.3 °F (36.3 °C)   TempSrc:  Skin   SpO2:  93%   Weight: 210 lb (95.3 kg) 215 lb 12.8 oz (97.9 kg)   Height: 5' 2.5\" (1.588 m) 5' 2.5\" (1.588 m)                                              BP Readings from Last 3 Encounters:   01/19/22 135/89   12/01/21 122/66   06/14/21 110/64 NPO Status: Time of last liquid consumption: 1800                        Time of last solid consumption: 1800                        Date of last liquid consumption: 01/18/22                        Date of last solid food consumption: 01/18/22    BMI:   Wt Readings from Last 3 Encounters:   01/19/22 215 lb 12.8 oz (97.9 kg)   12/01/21 214 lb 6.4 oz (97.3 kg)   06/14/21 217 lb (98.4 kg)     Body mass index is 38.84 kg/m². CBC:   Lab Results   Component Value Date    WBC 10.6 01/04/2022    RBC 5.33 01/04/2022    HGB 16.0 01/04/2022    HCT 51.8 01/04/2022    MCV 97.2 01/04/2022    RDW 15.2 02/05/2018     01/04/2022       CMP:   Lab Results   Component Value Date     11/22/2021    K 5.1 11/22/2021    K 4.5 02/05/2018     11/22/2021    CO2 30 11/22/2021    BUN 11 11/22/2021    CREATININE 0.7 11/22/2021    LABGLOM 83 11/22/2021    GLUCOSE 94 11/22/2021    PROT 7.0 11/22/2021    CALCIUM 9.2 11/22/2021    BILITOT 0.4 11/22/2021    ALKPHOS 102 11/22/2021    AST 15 11/22/2021    ALT <5 11/22/2021       POC Tests: No results for input(s): POCGLU, POCNA, POCK, POCCL, POCBUN, POCHEMO, POCHCT in the last 72 hours.     Coags:   Lab Results   Component Value Date    INR 0.97 02/05/2018    APTT 29.7 02/05/2018       HCG (If Applicable): No results found for: PREGTESTUR, PREGSERUM, HCG, HCGQUANT     ABGs: No results found for: PHART, PO2ART, CGM7TEY, WIA0IOP, BEART, S1TVJSRO     Type & Screen (If Applicable):  No results found for: LABABO, LABRH    Drug/Infectious Status (If Applicable):  No results found for: HIV, HEPCAB    COVID-19 Screening (If Applicable): No results found for: COVID19        Anesthesia Evaluation    Airway: Mallampati: II        Dental:          Pulmonary:   (+) COPD:  asthma:                            Cardiovascular:    (+) hypertension:,                   Neuro/Psych:               GI/Hepatic/Renal:             Endo/Other:                     Abdominal:   (+) obese, Vascular: Other Findings:             Anesthesia Plan      MAC     ASA 2             Anesthetic plan and risks discussed with patient. Plan discussed with CRNA.                   Philly Alvarado MD   1/19/2022

## 2022-01-19 NOTE — H&P
[facility]  History and Physical Update    Pt Name: Tori Lake  MRN: [id number]  YOB: 1955  Date of evaluation: 1/19/2022    I have examined the patient and reviewed the H&P/Consult and there are no changes to the patient or plans.       Marguerite Burroughs MD  Electronically signed 1/19/2022 at 6:49 AM

## 2022-01-19 NOTE — PROGRESS NOTES
1151 To recovery via chair. Spont resp. VSS report received from surgical rn. IV infusing KVO. Ace wrap in place to right foot. Skin graft site right grion intact with skin glue. Pt denies pain or nausea. Snack and drink given. Warming device applied. Family in room. Call bell In reach  [phone] Discharge instructions given to pt and visitor with each voicing understanding. 1222 IV discontinued.   200 Dressed and ready for discharge  1310 Dr Cheng Edouard in to see pt.  1318 Discharge per wheelchair in stable condition with visitor

## 2022-05-04 ENCOUNTER — OFFICE VISIT (OUTPATIENT)
Dept: FAMILY MEDICINE CLINIC | Age: 67
End: 2022-05-04
Payer: MEDICARE

## 2022-05-04 VITALS
WEIGHT: 218.6 LBS | BODY MASS INDEX: 39.35 KG/M2 | HEART RATE: 68 BPM | SYSTOLIC BLOOD PRESSURE: 122 MMHG | DIASTOLIC BLOOD PRESSURE: 62 MMHG | TEMPERATURE: 97.8 F | RESPIRATION RATE: 18 BRPM

## 2022-05-04 DIAGNOSIS — J06.9 VIRAL URI: Primary | ICD-10-CM

## 2022-05-04 DIAGNOSIS — J30.2 SEASONAL ALLERGIES: ICD-10-CM

## 2022-05-04 PROCEDURE — 99213 OFFICE O/P EST LOW 20 MIN: CPT | Performed by: NURSE PRACTITIONER

## 2022-05-04 RX ORDER — PREDNISONE 20 MG/1
20 TABLET ORAL 2 TIMES DAILY
Qty: 10 TABLET | Refills: 0 | Status: SHIPPED | OUTPATIENT
Start: 2022-05-04 | End: 2022-05-09

## 2022-05-04 RX ORDER — MONTELUKAST SODIUM 10 MG/1
10 TABLET ORAL NIGHTLY
Qty: 30 TABLET | Refills: 2 | Status: SHIPPED | OUTPATIENT
Start: 2022-05-04 | End: 2022-05-28 | Stop reason: SINTOL

## 2022-05-04 ASSESSMENT — ENCOUNTER SYMPTOMS
CONSTIPATION: 0
VOMITING: 0
SHORTNESS OF BREATH: 0
COUGH: 1
BLOOD IN STOOL: 0
DIARRHEA: 0
WHEEZING: 1
NAUSEA: 0

## 2022-05-04 NOTE — PATIENT INSTRUCTIONS
Patient Education        montelukast  Pronunciation: pamela pan  Brand: Singulair  What is the most important information I should know about montelukast?  Tell your doctor right away if you have signs of blood vessel inflammation: flu-like symptoms, severe sinus pain, a skin rash, numbness or a \"pins andneedles\" feeling in your arms or legs. Stop taking this medicine and call your doctor right away if you have any unusual changes in mood or behavior (such as agitation, confusion, depression, sleep problems, compulsive behaviors, hallucinations, or suicidal thoughts oractions). What is montelukast?  Montelukast is a leukotriene (ify-irk-ILJ-een) inhibitor that is used to prevent asthma attacks in adults and children at least 3years old. Montelukast is also used to prevent exercise-induced bronchoconstriction (narrowing of the air passages in the lungs) in adults and children who are atleast 10years old. Montelukast is also used to treat symptoms of seasonal or year-round (perennial) allergies in adults and children at least 3years old, after othertreatments did not work. If you already take montelukast to prevent asthma or allergy symptoms, do not use an extra dose to treat exercise-induced bronchoconstriction. Montelukast may also be used for purposes not listed in this medication guide. What should I discuss with my healthcare provider before taking montelukast?  You should not use montelukast if you are allergic to it. Tell your doctor if you have ever had:   mental illness or psychosis; or   asthma, or a history of severe allergic reaction (sneezing, runny or stuffy nose, wheezing, shortness of breath) after taking aspirin or another NSAID. The chewable tablet may contain phenylalanine and could be harmful if you have phenylketonuria(PKU). Tell your doctor if you are pregnant or breastfeeding. Do not give this medicine to a child without a doctor's advice.   How should I take montelukast?  Follow all directions on your prescription label and read all medication guidesor instruction sheets. Use the medicine exactly as directed. Montelukast is not a fast-acting rescue medicine for asthma attacks. Seek medical attention if your breathing problems get worse quickly, or if youthink your medications are not working. Montelukast is usually taken once every evening, with or without food. For exercise-induced bronchoconstriction, take a single dose at least 2 hoursbefore exercise, and do not take another dose for at least 24 hours. Swallow the regular tablet whole, with a glass of water. You must chew the chewable tablet before you swallow it. Place the oral granules  directly in your mouth and swallow, or mix them with applesauce, mashed carrots, rice, or ice cream. The granules may also be mixed with baby formula or breast milk. Do not use any other type of liquid. Use the mixture within 15minutes. Do not save for later use. If you also use an oral steroid medication, you should not stop using it suddenly. Follow your doctor's instructions about tapering your dose. Do not change your dose or stop using asthma medication without your doctor's advice. Store at room temperature away from moisture and heat. Do not open a packet oforal granules until you are ready to use the medicine. What happens if I miss a dose? Skip the missed dose and use your next dose at the regular time. Do not use two doses at one time. What happens if I overdose? Seek emergency medical attention or call the Poison Help line at 1-397.947.1787. What should I avoid while taking montelukast?  Avoid situations or activities that may trigger an asthma attack. If your asthma symptoms get worse when you take aspirin, avoid taking aspirin or other NSAIDs (nonsteroidal anti-inflammatory drugs) such as ibuprofen (Advil, Motrin), naproxen (Aleve), celecoxib, diclofenac, indomethacin,meloxicam, and others.   What are the possible side effects of montelukast?  Get emergency medical help if you have signs of an allergic reaction:  hives, blisters, severe itching; difficult breathing; swelling of your face,lips, tongue, or throat. Tell your doctor right away if you have signs of blood vessel inflammation: flu-like symptoms, severe sinus pain, a skin rash, numbness or a \"pins andneedles\" feeling in your arms or legs. Some people using montelukast have had new or worsening mental problems. Stop taking this medicine and call your doctor right away if you have unusualchanges in mood or behavior, such as:   agitation, aggression, feeling restless or irritable;   anxiety, depression, confusion, problems with memory or attention;   stuttering, tremors, uncontrolled muscle movements;   suicidal thoughts or actions;   hallucinations, sleep problems, vivid, dreams, sleep-walking; or   compulsive or repetitive behaviors. Common side effects may include:   stomach pain, diarrhea;   fever or other flu symptoms;   ear pain or full feeling, trouble hearing;   headache; or   cold symptoms such as runny or stuffy nose, sinus pain, cough, sore throat. This is not a complete list of side effects and others may occur. Call your doctor for medical advice about side effects. You may report side effects toFDA at 0-659-TFU-6229. What other drugs will affect montelukast?  Other drugs may affect montelukast, including prescription and over-the-counter medicines, vitamins, and herbal products. Tell your doctor about all othermedicines you use. Where can I get more information? Your pharmacist can provide more information about montelukast.  Remember, keep this and all other medicines out of the reach of children, never share your medicines with others, and use this medication only for the indication prescribed.    Every effort has been made to ensure that the information provided by [address] is accurate, up-to-date, and complete, but no guarantee is made to that effect. Drug information contained herein may be time sensitive. [facility] information has been compiled for use by healthcare practitioners and consumers in the [place] and therefore [facility] does not warrant that uses outside of the [place] are appropriate, unless specifically indicated otherwise. [facility]'s drug information does not endorse drugs, diagnose patients or recommend therapy. [facility] drug information is an informational resource designed to assist licensed healthcare practitioners in caring for their patients and/or to serve consumers viewing this service as a supplement to, and not a substitute for, the expertise, skill, knowledge and judgment of healthcare practitioners. The absence of a warning for a given drug or drug combination in no way should be construed to indicate that the drug or drug combination is safe, effective or appropriate for any given patient. [facility] does not assume any responsibility for any aspect of healthcare administered with the aid of information [facility] provides. The information contained herein is not intended to cover all possible uses, directions, precautions, warnings, drug interactions, allergic reactions, or adverse effects. If you have questions about the drugs you are taking, check with yourdoctor, nurse or pharmacist.  Copyright 1101-3158 [address]. Version: 18.01. Revision date:6/10/2021. Care instructions adapted under license by [facility] ([facility]). If you have questions about a medical condition or this instruction, always ask your healthcare professional. [place] [address] any warranty or liability for your use of this information.

## 2022-05-04 NOTE — PROGRESS NOTES
Chief Complaint   Patient presents with    Cough     Pt has been coughing up yellow mucus. This morning her mucus was yellow-green in color. Pt is also a little short of breath when she can feel the mucus in her throat. Pt also complaining of scratchy throat that comes and goes. Pt has also had more fatigue than normal lately and would like to discuss. Pt also complaining of a headache today as well.  Discuss Medications     Would like to discuss allegra. Christiano Julio is a 74 y. o.female      Pt complains of cough, thick yellow sputum starting Saturday. This morning there was a green tint to it. She is feeling well overall except some fatigue. She does note some wheezing and appetite is slightly decreased. She is drinking fluids but is not taking anything otc. Denies congestion, runny nose, fever, body aches. Pulse ox at home is 95-97%. She is using her inhaler a few times daily. Pt does not feel Marimar Bonine is helping her allergies. She does have asthma as well and requests to try Singulair    Review of Systems   Constitutional: Positive for fatigue. Negative for chills, diaphoresis and fever. Respiratory: Positive for cough (thick sputum) and wheezing (occasionally). Negative for shortness of breath. Cardiovascular: Negative for chest pain, palpitations and leg swelling. Gastrointestinal: Negative for blood in stool, constipation, diarrhea, nausea and vomiting. Genitourinary: Negative for dysuria and hematuria. Musculoskeletal: Negative for myalgias. Neurological: Positive for headaches. Negative for dizziness. All other systems reviewed and are negative. OBJECTIVE     /62   Pulse 68   Temp 97.8 °F (36.6 °C) (Oral)   Resp 18   Wt 218 lb 9.6 oz (99.2 kg)   BMI 39.35 kg/m²     Physical Exam  Vitals and nursing note reviewed. Constitutional:       Appearance: She is well-developed. HENT:      Head: Normocephalic and atraumatic.       Right Ear: External ear normal.      Left Ear: External ear normal.      Nose: Nose normal.   Eyes:      Conjunctiva/sclera: Conjunctivae normal.      Pupils: Pupils are equal, round, and reactive to light. Cardiovascular:      Rate and Rhythm: Normal rate and regular rhythm. Heart sounds: Normal heart sounds. Pulmonary:      Effort: Pulmonary effort is normal.      Breath sounds: Decreased breath sounds (used albuterol inhaler right before appt per patient) present. Abdominal:      General: Bowel sounds are normal.      Palpations: Abdomen is soft. Musculoskeletal:         General: Normal range of motion. Cervical back: Normal range of motion and neck supple. Skin:     General: Skin is warm and dry. Neurological:      Mental Status: She is alert and oriented to person, place, and time. Deep Tendon Reflexes: Reflexes are normal and symmetric. Psychiatric:         Behavior: Behavior normal.         Thought Content: Thought content normal.         Judgment: Judgment normal.           No results found for this visit on 05/04/22. ASSESSMENT       Diagnosis Orders   1. Viral URI     2. Seasonal allergies         PLAN     Requested Prescriptions     Signed Prescriptions Disp Refills    montelukast (SINGULAIR) 10 MG tablet 30 tablet 2     Sig: Take 1 tablet by mouth nightly    predniSONE (DELTASONE) 20 MG tablet 10 tablet 0     Sig: Take 1 tablet by mouth 2 times daily for 5 days     Stop allegra. Singulair sent to pharmacy  Prednisone sent to pharmacy. Viral nature of symptoms discussed  Symptomatic Care  Increase fluids and rest  RTO if symptoms worsen or stay the same        No orders of the defined types were placed in this encounter.               Electronically signed by GAETANO Gomez CNP on 5/4/2022 at 2:03 PM

## 2022-05-09 ENCOUNTER — TELEPHONE (OUTPATIENT)
Dept: FAMILY MEDICINE CLINIC | Age: 67
End: 2022-05-09

## 2022-05-09 DIAGNOSIS — J40 BRONCHITIS: Primary | ICD-10-CM

## 2022-05-09 RX ORDER — AZITHROMYCIN 250 MG/1
250 TABLET, FILM COATED ORAL SEE ADMIN INSTRUCTIONS
Qty: 6 TABLET | Refills: 0 | Status: SHIPPED | OUTPATIENT
Start: 2022-05-09 | End: 2022-05-14

## 2022-05-09 NOTE — TELEPHONE ENCOUNTER
Will send antibiotic to pharmacy. If symptoms fail to improve or worsen, will proceed with CXR.  Thanks, TS

## 2022-05-09 NOTE — TELEPHONE ENCOUNTER
Pt says she is still coughing, pt finished predinsone yesterday. Pt tried the singular on wed eve but she said she felt nauseated with it. Pt is not sure if it was the combo of meds but she is going try it again tonight. Pt says she has a st today,cough,pt says she still has thick mucus. Has not had any fever, and is unable to sleep due to the cough. Pt is not any better. Pt seen Kevin Duran on Wednesday.   Asking what you recommend she do.       gigi nunez see chart    cpb

## 2022-05-28 ENCOUNTER — TELEPHONE (OUTPATIENT)
Dept: FAMILY MEDICINE CLINIC | Age: 67
End: 2022-05-28

## 2022-06-08 ENCOUNTER — TELEPHONE (OUTPATIENT)
Dept: FAMILY MEDICINE CLINIC | Age: 67
End: 2022-06-08

## 2022-06-08 RX ORDER — MONTELUKAST SODIUM 10 MG/1
10 TABLET ORAL NIGHTLY
Qty: 30 TABLET | Refills: 2 | Status: SHIPPED | OUTPATIENT
Start: 2022-06-08 | End: 2022-08-12

## 2022-07-11 RX ORDER — ALBUTEROL SULFATE 90 UG/1
AEROSOL, METERED RESPIRATORY (INHALATION)
Qty: 3 EACH | Refills: 3 | Status: SHIPPED | OUTPATIENT
Start: 2022-07-11

## 2022-07-25 ENCOUNTER — TELEPHONE (OUTPATIENT)
Dept: FAMILY MEDICINE CLINIC | Age: 67
End: 2022-07-25

## 2022-07-25 NOTE — TELEPHONE ENCOUNTER
Pt has been feeling very fatigued, says last week she did not feel well, had a ha on wed and thurs, says she tested at home for covid. says she is tender above and around her eyes. Pt has been unable to shake the fatigue, has been going on for the last 2 weeks. Pt has been taking a nap if needed then does feel a little better. Taking a nap is not normal for her. Pt says most nights she is able to sleep at least 6 hours not inturrupted and pt is still fatigued today. Pt took a covid test this morning and it was negative. Pt says she just feels like her head is in a fog. Asking what you recommend. Pt has been taking allegra and singular for allgys, but has not tried anything for the fatigue. [facility]   Allgy see chart    cpb

## 2022-07-25 NOTE — TELEPHONE ENCOUNTER
She can try vitamin supplementation with a multivitamin with iron and a B complex vitamin. If not improved with this, needs appt. Ok for Handy Guajardo or Domi Laboy.  KAUSHIK

## 2022-07-26 ENCOUNTER — OFFICE VISIT (OUTPATIENT)
Dept: FAMILY MEDICINE CLINIC | Age: 67
End: 2022-07-26
Payer: MEDICARE

## 2022-07-26 VITALS
HEART RATE: 60 BPM | RESPIRATION RATE: 20 BRPM | OXYGEN SATURATION: 96 % | SYSTOLIC BLOOD PRESSURE: 116 MMHG | BODY MASS INDEX: 39.6 KG/M2 | DIASTOLIC BLOOD PRESSURE: 70 MMHG | WEIGHT: 220 LBS | TEMPERATURE: 97.8 F

## 2022-07-26 DIAGNOSIS — E55.9 VITAMIN D DEFICIENCY, UNSPECIFIED: ICD-10-CM

## 2022-07-26 DIAGNOSIS — R53.83 FATIGUE, UNSPECIFIED TYPE: ICD-10-CM

## 2022-07-26 DIAGNOSIS — J01.90 ACUTE BACTERIAL SINUSITIS: Primary | ICD-10-CM

## 2022-07-26 DIAGNOSIS — R79.9 ABNORMAL FINDING OF BLOOD CHEMISTRY, UNSPECIFIED: ICD-10-CM

## 2022-07-26 DIAGNOSIS — B96.89 ACUTE BACTERIAL SINUSITIS: Primary | ICD-10-CM

## 2022-07-26 PROCEDURE — 99214 OFFICE O/P EST MOD 30 MIN: CPT | Performed by: NURSE PRACTITIONER

## 2022-07-26 PROCEDURE — 1123F ACP DISCUSS/DSCN MKR DOCD: CPT | Performed by: NURSE PRACTITIONER

## 2022-07-26 RX ORDER — AMOXICILLIN AND CLAVULANATE POTASSIUM 875; 125 MG/1; MG/1
1 TABLET, FILM COATED ORAL 2 TIMES DAILY
Qty: 14 TABLET | Refills: 0 | Status: SHIPPED | OUTPATIENT
Start: 2022-07-26 | End: 2022-08-02

## 2022-07-26 RX ORDER — PREDNISONE 10 MG/1
10 TABLET ORAL 2 TIMES DAILY
Qty: 10 TABLET | Refills: 0 | Status: SHIPPED | OUTPATIENT
Start: 2022-07-26 | End: 2022-07-31

## 2022-07-26 RX ORDER — FEXOFENADINE HCL 180 MG/1
180 TABLET ORAL DAILY
COMMUNITY

## 2022-07-26 SDOH — ECONOMIC STABILITY: FOOD INSECURITY: WITHIN THE PAST 12 MONTHS, THE FOOD YOU BOUGHT JUST DIDN'T LAST AND YOU DIDN'T HAVE MONEY TO GET MORE.: PATIENT DECLINED

## 2022-07-26 SDOH — ECONOMIC STABILITY: FOOD INSECURITY: WITHIN THE PAST 12 MONTHS, YOU WORRIED THAT YOUR FOOD WOULD RUN OUT BEFORE YOU GOT MONEY TO BUY MORE.: PATIENT DECLINED

## 2022-07-26 ASSESSMENT — PATIENT HEALTH QUESTIONNAIRE - PHQ9
SUM OF ALL RESPONSES TO PHQ QUESTIONS 1-9: 0
1. LITTLE INTEREST OR PLEASURE IN DOING THINGS: 0
2. FEELING DOWN, DEPRESSED OR HOPELESS: 0
SUM OF ALL RESPONSES TO PHQ9 QUESTIONS 1 & 2: 0
SUM OF ALL RESPONSES TO PHQ QUESTIONS 1-9: 0

## 2022-07-26 ASSESSMENT — ENCOUNTER SYMPTOMS
CONSTIPATION: 0
ABDOMINAL PAIN: 0
COLOR CHANGE: 0
BLOOD IN STOOL: 0
EYE REDNESS: 0
EYE DISCHARGE: 0
NAUSEA: 0
SHORTNESS OF BREATH: 0
ABDOMINAL DISTENTION: 0
DIARRHEA: 0
ANAL BLEEDING: 0
COUGH: 1
SORE THROAT: 0
RHINORRHEA: 0

## 2022-07-26 ASSESSMENT — SOCIAL DETERMINANTS OF HEALTH (SDOH): HOW HARD IS IT FOR YOU TO PAY FOR THE VERY BASICS LIKE FOOD, HOUSING, MEDICAL CARE, AND HEATING?: PATIENT DECLINED

## 2022-07-26 NOTE — PROGRESS NOTES
[facility]  [phone] (phone)  [phone] (fax)    Visit Date: 7/26/2022    Liz Yu is a 69 y.o. female who presents today for:  Chief Complaint   Patient presents with    Headache     C/O frontal HA x 1 week. Also c/o nasal congestion and fatigue. HPI:     2 weeks of fatigue, drainage, headache    Home covid test was \"faint\" positive the next day it was negative - then tested negative again. Checked again this Monday - negative     Last monday and Tuesday started with frontal headaches - moving into her face. Today her left ear was very full    Starting to cough up yellow mucus.    HPI  Health Maintenance   Topic Date Due    Hepatitis C screen  Never done    Shingles vaccine (1 of 2) Never done    Low dose CT lung screening  Never done    DEXA (modify frequency per FRAX score)  Never done    DTaP/Tdap/Td vaccine (2 - Td or Tdap) 09/26/2021    COVID-19 Vaccine (4 - Booster for Moderna series) 02/27/2022    Flu vaccine (1) 09/01/2022    Depression Screen  12/01/2022    Annual Wellness Visit (AWV)  12/02/2022    Breast cancer screen  09/15/2023    Colorectal Cancer Screen  08/01/2024    Lipids  11/22/2026    Pneumococcal 65+ years Vaccine  Completed    Hepatitis A vaccine  Aged Out    Hepatitis B vaccine  Aged Out    Hib vaccine  Aged Out    Meningococcal (ACWY) vaccine  Aged Out     Past Medical History:   Diagnosis Date    Asthma     Chronic back pain     COPD (chronic obstructive pulmonary disease) ([facility] [address]     Diverticulosis     Essential hypertension 11/5/2019    Hx of blood clots 1970    leg    Obesity     Osteoarthritis     PONV (postoperative nausea and vomiting)     with knee replacement only and patient thinks that was from the nerve block    Tobacco abuse       Past Surgical History:   Procedure Laterality Date    APPENDECTOMY  1990's    CATARACT REMOVAL Left 02/09/2022    CATARACT REMOVAL Right 02/16/2022    CHOLECYSTECTOMY  1990's    COLONOSCOPY  2008     [place]    CYST REMOVAL Right 2021    Had a cyst removed from her right big toe that she will need to have another surgery to fix    FOOT SURGERY Right 1/19/2022    EXCISION CYSTIC MASS RIGHT GREAT TOE,  SKIN GRAFT FROM RIGHT UPPER THIGH performed by Ольга Johnson MD at [address] Left 10/08/2016    femur fracture--done in [address] [place] [address]    JOINT REPLACEMENT  2016    Right total knee    KNEE ARTHROSCOPY  1998    left knee    KNEE ARTHROSCOPY Right 9/30/15,12/9/15    Dr. Frieda Pineda Right 05/23/2016    TONSILLECTOMY AND ADENOIDECTOMY  1980's     Family History   Problem Relation Age of Onset    High Blood Pressure Mother     Stroke Mother     Cancer Mother         skin    Diabetes Father     Cancer Maternal Grandmother     Heart Disease Maternal Grandmother      Social History     Tobacco Use    Smoking status: Every Day     Packs/day: 0.75     Years: 40.00     Pack years: 30.00     Types: Cigarettes    Smokeless tobacco: Never   Substance Use Topics    Alcohol use: Yes     Comment: rarely      Current Outpatient Medications   Medication Sig Dispense Refill    fexofenadine (ALLEGRA ALLERGY) 180 MG tablet Take 180 mg by mouth in the morning. predniSONE (DELTASONE) 10 MG tablet Take 1 tablet by mouth in the morning and 1 tablet before bedtime. Do all this for 5 days. 10 tablet 0    amoxicillin-clavulanate (AUGMENTIN) 875-125 MG per tablet Take 1 tablet by mouth in the morning and 1 tablet before bedtime. Do all this for 7 days.  14 tablet 0    albuterol sulfate HFA (PROVENTIL;VENTOLIN;PROAIR) 108 (90 Base) MCG/ACT inhaler INHALE 2 PUFFS INTO THE LUNGS EVERY 4 TO 6 HOURS AS NEEDED FOR WHEEZING 3 each 3    montelukast (SINGULAIR) 10 MG tablet Take 1 tablet by mouth nightly 30 tablet 2    Multiple Vitamins-Minerals (OCUVITE PO) Take by mouth daily      lisinopril (PRINIVIL;ZESTRIL) 10 MG tablet Take 1 tablet by mouth daily 90 tablet 3    naproxen (NAPROSYN) 250 MG tablet TAKE 1 TABLET BY MOUTH TWICE DAILY AS NEEDED FOR PAIN 60 tablet 1    Calcium Citrate-Vitamin D (CALCIUM + D PO) Take by mouth daily       No current facility-administered medications for this visit. Allergies   Allergen Reactions    Flonase [Fluticasone Propionate] Other (See Comments)     Causes Epistaxis    Mucinex D [Pseudoephedrine-Guaifenesin Er] Other (See Comments)     Doesn't work, makes congestion worse. Subjective:    Review of Systems   Constitutional:  Positive for fatigue. Negative for chills and fever. HENT:  Negative for congestion, ear pain, postnasal drip, rhinorrhea and sore throat. Eyes:  Negative for discharge and redness. Respiratory:  Positive for cough. Negative for shortness of breath. Cardiovascular:  Negative for chest pain and leg swelling. Gastrointestinal:  Negative for abdominal distention, abdominal pain, anal bleeding, blood in stool, constipation, diarrhea and nausea. Skin:  Negative for color change and rash. Neurological:  Positive for headaches. Negative for facial asymmetry, speech difficulty and weakness. Hematological:  Does not bruise/bleed easily. Psychiatric/Behavioral:  Negative for agitation and confusion. Objective:     Vitals:    07/26/22 1357   BP: 116/70   Site: Right Upper Arm   Pulse: 60   Resp: 20   Temp: 97.8 °F (36.6 °C)   TempSrc: Oral   SpO2: 96%   Weight: 220 lb (99.8 kg)       Body mass index is 39.6 kg/m². Wt Readings from Last 3 Encounters:   07/26/22 220 lb (99.8 kg)   05/04/22 218 lb 9.6 oz (99.2 kg)   01/19/22 215 lb 12.8 oz (97.9 kg)     BP Readings from Last 3 Encounters:   07/26/22 116/70   05/04/22 122/62   01/19/22 (!) 101/57     Physical Exam  Constitutional:       General: She is not in acute distress. Appearance: She is well-developed. She is not ill-appearing or diaphoretic. HENT:      Head: Normocephalic and atraumatic.       Right Ear: Hearing and external ear normal. No decreased hearing noted. Left Ear: Hearing and external ear normal. No decreased hearing noted. Nose: Nose normal. No nasal deformity. Eyes:      General:         Right eye: No discharge. Left eye: No discharge. Conjunctiva/sclera: Conjunctivae normal.   Pulmonary:      Effort: Pulmonary effort is normal. No respiratory distress. Abdominal:      General: There is no distension. Tenderness: There is no guarding. Musculoskeletal:         General: No tenderness or deformity. Normal range of motion. Cervical back: Normal range of motion and neck supple. Skin:     Coloration: Skin is not pale. Findings: No erythema or rash (On exposed areas). Neurological:      Mental Status: She is alert. Gait: Gait normal.   Psychiatric:         Speech: Speech normal.         Behavior: Behavior normal.         Thought Content: Thought content normal.         Judgment: Judgment normal.       Lab Results   Component Value Date    WBC 10.6 01/04/2022    HGB 16.0 01/04/2022    HCT 51.8 (H) 01/04/2022     01/04/2022    CHOL 159 11/22/2021    TRIG 71 11/22/2021    HDL 58 11/22/2021    LDLCALC 87 11/22/2021    AST 15 11/22/2021     11/22/2021    K 5.1 11/22/2021     11/22/2021    CREATININE 0.7 11/22/2021    BUN 11 11/22/2021    CO2 30 11/22/2021    TSH 1.920 06/18/2019    INR 0.97 02/05/2018    LABGLOM 83 (A) 11/22/2021    CALCIUM 9.2 11/22/2021     Assessment:       Diagnosis Orders   1. Acute bacterial sinusitis  predniSONE (DELTASONE) 10 MG tablet    amoxicillin-clavulanate (AUGMENTIN) 875-125 MG per tablet      2. Fatigue, unspecified type  TSH with Reflex    Vitamin D 25 Hydroxy    Iron and TIBC    CBC with Auto Differential    Vitamin B12 & Folate      3. Vitamin D deficiency, unspecified   Vitamin D 25 Hydroxy      4. Abnormal finding of blood chemistry, unspecified   Iron and TIBC          Plan:   Zoye was seen today for headache.     Diagnoses and all orders for this visit:    Acute bacterial sinusitis  -     predniSONE (DELTASONE) 10 MG tablet; Take 1 tablet by mouth in the morning and 1 tablet before bedtime. Do all this for 5 days. -     amoxicillin-clavulanate (AUGMENTIN) 875-125 MG per tablet; Take 1 tablet by mouth in the morning and 1 tablet before bedtime. Do all this for 7 days. Fatigue, unspecified type  -     TSH with Reflex; Future  -     Vitamin D 25 Hydroxy; Future  -     Iron and TIBC; Future  -     CBC with Auto Differential; Future  -     Vitamin B12 & Folate; Future    Vitamin D deficiency, unspecified   -     Vitamin D 25 Hydroxy; Future    Abnormal finding of blood chemistry, unspecified   -     Iron and TIBC; Future      Return in about 3 months (around 10/26/2022). Orders Placed:  Orders Placed This Encounter   Procedures    TSH with Reflex    Vitamin D 25 Hydroxy    Iron and TIBC    CBC with Auto Differential    Vitamin B12 & Folate     Medications Prescribed:  Orders Placed This Encounter   Medications    predniSONE (DELTASONE) 10 MG tablet     Sig: Take 1 tablet by mouth in the morning and 1 tablet before bedtime. Do all this for 5 days. Dispense:  10 tablet     Refill:  0    amoxicillin-clavulanate (AUGMENTIN) 875-125 MG per tablet     Sig: Take 1 tablet by mouth in the morning and 1 tablet before bedtime. Do all this for 7 days. Dispense:  14 tablet     Refill:  0     No future appointments. Patient given educational materials - see patient instructions. Discussed use, benefit, and side effects of prescribedmedications. All patient questions answered. Pt voiced understanding. Reviewed health maintenance. Instructed to continue current medications, diet and exercise. Patient agreed with treatment plan. Follow up as directed.     Electronically signed by GAETANO Garcia CNP on 7/26/2022 at 2:28 PM

## 2022-08-11 DIAGNOSIS — M17.0 PRIMARY OSTEOARTHRITIS OF BOTH KNEES: ICD-10-CM

## 2022-08-12 RX ORDER — MONTELUKAST SODIUM 10 MG/1
TABLET ORAL
Qty: 90 TABLET | Refills: 3 | Status: SHIPPED | OUTPATIENT
Start: 2022-08-12

## 2022-08-12 RX ORDER — NAPROXEN 250 MG/1
TABLET ORAL
Qty: 60 TABLET | Refills: 1 | Status: SHIPPED | OUTPATIENT
Start: 2022-08-12

## 2022-08-12 NOTE — TELEPHONE ENCOUNTER
Request sent from [facility] for refill of singulair 10 mg qhs. Last seen 7/26/22, no future appt scheduled. Med verified. Order pended.

## 2022-08-12 NOTE — TELEPHONE ENCOUNTER
Request sent from [facility] for refill of naproxen 250 mg bid prn. Last seen 7/26/22, no future appt scheduled. Med verified, order pended.

## 2022-09-22 ENCOUNTER — HOSPITAL ENCOUNTER (OUTPATIENT)
Dept: WOMENS IMAGING | Age: 67
Discharge: HOME | End: 2022-09-22
Payer: MEDICARE

## 2022-09-22 DIAGNOSIS — Z12.31 VISIT FOR SCREENING MAMMOGRAM: ICD-10-CM

## 2022-09-22 PROCEDURE — 77063 BREAST TOMOSYNTHESIS BI: CPT

## 2022-10-12 DIAGNOSIS — I10 ESSENTIAL HYPERTENSION: ICD-10-CM

## 2022-10-12 RX ORDER — LISINOPRIL 10 MG/1
10 TABLET ORAL DAILY
Qty: 90 TABLET | Refills: 3 | Status: SHIPPED | OUTPATIENT
Start: 2022-10-12

## 2022-10-12 NOTE — TELEPHONE ENCOUNTER
This medication refill is regarding a electronic request.  Refill requested by  [address] [place] [address] Requested Prescriptions     Pending Prescriptions Disp Refills    lisinopril (PRINIVIL;ZESTRIL) 10 MG tablet [Pharmacy Med Name: LISINOPRIL 10MG TABLETS] 90 tablet 3     Sig: TAKE 1 TABLET BY MOUTH DAILY     Date of last visit: 7/26/2022   Date of next visit: None  Date of last refill: 12/1/21 #90/3    Rx verified, ordered and set to EP.

## 2022-10-14 NOTE — TELEPHONE ENCOUNTER
Okay to restart. Med sent to pharmacy.  TS
Pt notified
Pt started taking Singular 05/04/2022. She called CG while she was on call and told her that she was having GERD and was advised to discontinue the medication. Pt now believes that her GERD was not from the medication and was from her diet. She is no longer having any symptoms. She has started avoiding certain foods and feels better. She restarted the medication about 3-4 days ago and loves being on it. No signs of GERD. She has decreased the amount of uses with her inhaler. She would like to restart the medication if possible. Her pharmacy is [facility].
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 resume Prednisone 10 mg  and Ice affected joint, elevate rigth wrist when resting.\par  f/u next week.\par  go to ER if develop any neurological symptoms.

## 2022-10-26 ENCOUNTER — OFFICE VISIT (OUTPATIENT)
Dept: FAMILY MEDICINE CLINIC | Age: 67
End: 2022-10-26
Payer: MEDICARE

## 2022-10-26 VITALS
SYSTOLIC BLOOD PRESSURE: 116 MMHG | TEMPERATURE: 97.9 F | HEART RATE: 72 BPM | DIASTOLIC BLOOD PRESSURE: 72 MMHG | WEIGHT: 219 LBS | RESPIRATION RATE: 16 BRPM | BODY MASS INDEX: 39.42 KG/M2

## 2022-10-26 DIAGNOSIS — E66.01 CLASS 3 SEVERE OBESITY DUE TO EXCESS CALORIES WITH SERIOUS COMORBIDITY AND BODY MASS INDEX (BMI) OF 40.0 TO 44.9 IN ADULT (HCC): ICD-10-CM

## 2022-10-26 DIAGNOSIS — I10 ESSENTIAL HYPERTENSION: ICD-10-CM

## 2022-10-26 DIAGNOSIS — J40 BRONCHITIS: Primary | ICD-10-CM

## 2022-10-26 DIAGNOSIS — J44.9 CHRONIC OBSTRUCTIVE PULMONARY DISEASE, UNSPECIFIED COPD TYPE (HCC): ICD-10-CM

## 2022-10-26 DIAGNOSIS — R05.1 ACUTE COUGH: ICD-10-CM

## 2022-10-26 PROCEDURE — 1123F ACP DISCUSS/DSCN MKR DOCD: CPT | Performed by: NURSE PRACTITIONER

## 2022-10-26 PROCEDURE — 3078F DIAST BP <80 MM HG: CPT | Performed by: NURSE PRACTITIONER

## 2022-10-26 PROCEDURE — 87804 INFLUENZA ASSAY W/OPTIC: CPT | Performed by: NURSE PRACTITIONER

## 2022-10-26 PROCEDURE — 99213 OFFICE O/P EST LOW 20 MIN: CPT | Performed by: NURSE PRACTITIONER

## 2022-10-26 PROCEDURE — 3074F SYST BP LT 130 MM HG: CPT | Performed by: NURSE PRACTITIONER

## 2022-10-26 RX ORDER — AZITHROMYCIN 250 MG/1
250 TABLET, FILM COATED ORAL SEE ADMIN INSTRUCTIONS
Qty: 6 TABLET | Refills: 0 | Status: SHIPPED | OUTPATIENT
Start: 2022-10-26 | End: 2022-10-31

## 2022-10-26 RX ORDER — AMOXICILLIN 500 MG/1
CAPSULE ORAL
COMMUNITY
Start: 2022-09-28

## 2022-10-26 RX ORDER — PREDNISONE 20 MG/1
20 TABLET ORAL 2 TIMES DAILY
Qty: 10 TABLET | Refills: 0 | Status: SHIPPED | OUTPATIENT
Start: 2022-10-26 | End: 2022-10-31

## 2022-10-26 NOTE — PROGRESS NOTES
Chief Complaint   Patient presents with    Cough     C/O worsening cough over the last week especially over the last 2 days. Also, nasal congestion and sore throat. Tested negative for COVID on Monday. Wants to also discuss constipation and GERD           SUBJECTIVE     Zoey TOVAR Juan Waggoner is a 69 y. o.female      Pt complains of cough for the last week. This has been worse the last 2 days to where she fears she may vomit. She does have thick phlegm, especially in the morning. Some increased sob with the cough and some wheezing. She also notes some ear popping, sore throat, and acid reflux. Has omeprazole at home but has been using tums. Tested negative for covid on Monday. Is using [place] with some relief. Broke left femur in 2015/2016. Had a knee replacement on right knee shortly after so she was putting more weight on the knee than she should have. Now has chronic right hip and leg pain, describes as a \"tooth ache\". Naprosyn is no longer helping. Tens unit helps temporarily. She does see the chiropractor with some relief. Review of Systems   Constitutional:  Positive for fatigue. Negative for chills, diaphoresis and fever. HENT:  Positive for congestion and sore throat. Respiratory:  Positive for cough, shortness of breath and wheezing. Cardiovascular:  Negative for chest pain, palpitations and leg swelling. Gastrointestinal:  Negative for blood in stool, constipation, diarrhea, nausea and vomiting. Genitourinary:  Negative for dysuria and hematuria. Musculoskeletal:  Positive for arthralgias. Negative for myalgias. Neurological:  Negative for dizziness and headaches. All other systems reviewed and are negative. OBJECTIVE     /72   Pulse 72   Temp 97.9 °F (36.6 °C) (Oral)   Resp 16   Wt 219 lb (99.3 kg)   BMI 39.42 kg/m²     Physical Exam  Vitals and nursing note reviewed. Constitutional:       Appearance: She is well-developed. She is ill-appearing.  She is not toxic-appearing or diaphoretic. HENT:      Head: Normocephalic and atraumatic. Right Ear: External ear normal.      Left Ear: External ear normal.      Nose: Nose normal.   Eyes:      Conjunctiva/sclera: Conjunctivae normal.      Pupils: Pupils are equal, round, and reactive to light. Cardiovascular:      Rate and Rhythm: Normal rate and regular rhythm. Heart sounds: Normal heart sounds. Pulmonary:      Effort: Pulmonary effort is normal.      Breath sounds: Wheezing present. Abdominal:      General: Bowel sounds are normal.      Palpations: Abdomen is soft. Musculoskeletal:         General: Normal range of motion. Cervical back: Normal range of motion and neck supple. Skin:     General: Skin is warm and dry. Neurological:      Mental Status: She is alert and oriented to person, place, and time. Deep Tendon Reflexes: Reflexes are normal and symmetric. Psychiatric:         Behavior: Behavior normal.         Thought Content: Thought content normal.         Judgment: Judgment normal.         No results found for this visit on 10/26/22. ASSESSMENT       Diagnosis Orders   1. Bronchitis  predniSONE (DELTASONE) 20 MG tablet    azithromycin (ZITHROMAX) 250 MG tablet      2. Acute cough  POCT Influenza A/B      3. Essential hypertension  Lipid Panel    Comprehensive Metabolic Panel      4. Chronic obstructive pulmonary disease, unspecified COPD type ([facility] [address]  Comprehensive Metabolic Panel      5.  Class 3 severe obesity due to excess calories with serious comorbidity and body mass index (BMI) of 40.0 to 44.9 in adult [facility])  Lipid Panel    Comprehensive Metabolic Panel          PLAN     Requested Prescriptions     Signed Prescriptions Disp Refills    predniSONE (DELTASONE) 20 MG tablet 10 tablet 0     Sig: Take 1 tablet by mouth 2 times daily for 5 days    azithromycin (ZITHROMAX) 250 MG tablet 6 tablet 0     Sig: Take 1 tablet by mouth See Admin Instructions for 5 days 500mg on day 1 followed by 250mg on days 2 - 5         Orders Placed This Encounter   Procedures    Lipid Panel     Standing Status:   Future     Standing Expiration Date:   10/26/2023     Order Specific Question:   Is Patient Fasting?/# of Hours     Answer:   yes/12    Comprehensive Metabolic Panel     Standing Status:   Future     Standing Expiration Date:   10/26/2023    POCT Influenza A/B     Prednisone and azithromycin sent to pharmacy  Influenza swab is negative today  Increase fluids and rest  Labs prior to next appointment ordered  Will let office know if she decides to see pain management  Will need xray right hip if she decides to proceed with this  Follow-up in December for annual wellness visit          Electronically signed by GAETANO Garduno CNP on 11/1/2022 at 3:37 PM

## 2022-10-30 ENCOUNTER — HOSPITAL ENCOUNTER (EMERGENCY)
Age: 67
Discharge: HOME | End: 2022-10-30
Payer: MEDICARE

## 2022-10-30 ENCOUNTER — APPOINTMENT (OUTPATIENT)
Dept: GENERAL RADIOLOGY | Age: 67
End: 2022-10-30
Payer: MEDICARE

## 2022-10-30 VITALS
RESPIRATION RATE: 18 BRPM | DIASTOLIC BLOOD PRESSURE: 66 MMHG | OXYGEN SATURATION: 94 % | SYSTOLIC BLOOD PRESSURE: 125 MMHG | TEMPERATURE: 97.7 F | HEART RATE: 50 BPM

## 2022-10-30 DIAGNOSIS — K59.00 CONSTIPATION, UNSPECIFIED CONSTIPATION TYPE: Primary | ICD-10-CM

## 2022-10-30 LAB
ALBUMIN SERPL-MCNC: 4.2 G/DL (ref 3.5–5.1)
ALP BLD-CCNC: 104 U/L (ref 38–126)
ALT SERPL-CCNC: 7 U/L (ref 11–66)
ANION GAP SERPL CALCULATED.3IONS-SCNC: 11 MEQ/L (ref 8–16)
AST SERPL-CCNC: 18 U/L (ref 5–40)
BASOPHILS # BLD: 0.4 %
BASOPHILS ABSOLUTE: 0.1 THOU/MM3 (ref 0–0.1)
BILIRUB SERPL-MCNC: 0.4 MG/DL (ref 0.3–1.2)
BILIRUBIN URINE: NEGATIVE
BLOOD, URINE: NEGATIVE
BUN BLDV-MCNC: 10 MG/DL (ref 7–22)
CALCIUM SERPL-MCNC: 9.6 MG/DL (ref 8.5–10.5)
CHARACTER, URINE: CLEAR
CHLORIDE BLD-SCNC: 96 MEQ/L (ref 98–111)
CO2: 31 MEQ/L (ref 23–33)
COLOR: YELLOW
CREAT SERPL-MCNC: 0.6 MG/DL (ref 0.4–1.2)
EOSINOPHIL # BLD: 0.2 %
EOSINOPHILS ABSOLUTE: 0 THOU/MM3 (ref 0–0.4)
ERYTHROCYTE [DISTWIDTH] IN BLOOD BY AUTOMATED COUNT: 13.2 % (ref 11.5–14.5)
ERYTHROCYTE [DISTWIDTH] IN BLOOD BY AUTOMATED COUNT: 47.4 FL (ref 35–45)
GFR SERPL CREATININE-BSD FRML MDRD: > 60 ML/MIN/1.73M2
GLUCOSE BLD-MCNC: 97 MG/DL (ref 70–108)
GLUCOSE URINE: NEGATIVE MG/DL
HCT VFR BLD CALC: 50.4 % (ref 37–47)
HEMOGLOBIN: 15.6 GM/DL (ref 12–16)
IMMATURE GRANS (ABS): 0.13 THOU/MM3 (ref 0–0.07)
IMMATURE GRANULOCYTES: 1 %
KETONES, URINE: NEGATIVE
LEUKOCYTE ESTERASE, URINE: NEGATIVE
LYMPHOCYTES # BLD: 8.5 %
LYMPHOCYTES ABSOLUTE: 1.1 THOU/MM3 (ref 1–4.8)
MCH RBC QN AUTO: 30.1 PG (ref 26–33)
MCHC RBC AUTO-ENTMCNC: 31 GM/DL (ref 32.2–35.5)
MCV RBC AUTO: 97.1 FL (ref 81–99)
MONOCYTES # BLD: 3.7 %
MONOCYTES ABSOLUTE: 0.5 THOU/MM3 (ref 0.4–1.3)
NITRITE, URINE: NEGATIVE
NUCLEATED RED BLOOD CELLS: 0 /100 WBC
OSMOLALITY CALCULATION: 274.6 MOSMOL/KG (ref 275–300)
PH UA: 8 (ref 5–9)
PLATELET # BLD: 333 THOU/MM3 (ref 130–400)
PMV BLD AUTO: 10 FL (ref 9.4–12.4)
POTASSIUM REFLEX MAGNESIUM: 4.6 MEQ/L (ref 3.5–5.2)
PROTEIN UA: NEGATIVE
RBC # BLD: 5.19 MILL/MM3 (ref 4.2–5.4)
SEG NEUTROPHILS: 86.2 %
SEGMENTED NEUTROPHILS ABSOLUTE COUNT: 11.1 THOU/MM3 (ref 1.8–7.7)
SODIUM BLD-SCNC: 138 MEQ/L (ref 135–145)
SPECIFIC GRAVITY, URINE: 1.01 (ref 1–1.03)
TOTAL PROTEIN: 7.1 G/DL (ref 6.1–8)
TSH SERPL DL<=0.05 MIU/L-ACNC: 1 UIU/ML (ref 0.4–4.2)
UROBILINOGEN, URINE: 0.2 EU/DL (ref 0–1)
WBC # BLD: 12.9 THOU/MM3 (ref 4.8–10.8)

## 2022-10-30 PROCEDURE — 36415 COLL VENOUS BLD VENIPUNCTURE: CPT

## 2022-10-30 PROCEDURE — 80053 COMPREHEN METABOLIC PANEL: CPT

## 2022-10-30 PROCEDURE — 99284 EMERGENCY DEPT VISIT MOD MDM: CPT

## 2022-10-30 PROCEDURE — 85025 COMPLETE CBC W/AUTO DIFF WBC: CPT

## 2022-10-30 PROCEDURE — 84443 ASSAY THYROID STIM HORMONE: CPT

## 2022-10-30 PROCEDURE — 74018 RADEX ABDOMEN 1 VIEW: CPT

## 2022-10-30 PROCEDURE — 81003 URINALYSIS AUTO W/O SCOPE: CPT

## 2022-10-30 PROCEDURE — 6370000000 HC RX 637 (ALT 250 FOR IP): Performed by: PHYSICIAN ASSISTANT

## 2022-10-30 RX ORDER — MINERAL OIL 100 G/100G
1 OIL RECTAL PRN
Status: DISCONTINUED | OUTPATIENT
Start: 2022-10-30 | End: 2022-10-30 | Stop reason: HOSPADM

## 2022-10-30 RX ORDER — HYDROCORTISONE ACETATE 25 MG/1
25 SUPPOSITORY RECTAL ONCE
Status: COMPLETED | OUTPATIENT
Start: 2022-10-30 | End: 2022-10-30

## 2022-10-30 RX ADMIN — HYDROCORTISONE ACETATE 25 MG: 25 SUPPOSITORY RECTAL at 14:30

## 2022-10-30 RX ADMIN — MINERAL OIL 1 ENEMA: 100 ENEMA RECTAL at 16:49

## 2022-10-30 ASSESSMENT — PAIN - FUNCTIONAL ASSESSMENT
PAIN_FUNCTIONAL_ASSESSMENT: NONE - DENIES PAIN
PAIN_FUNCTIONAL_ASSESSMENT: NONE - DENIES PAIN

## 2022-10-30 NOTE — ED NOTES
Pt to ED c/o constipation and loose bowel leaking out. Pt states she was recently sick and was started on axb and prednisone on the 26th. Pts last normal bowel movement was Monday or Tuesday. Pt states taking Milk of Magnesia several times, enemas at home, different oils, and digital removal with no success.  MANDO Pardo, RN  10/30/22 9553

## 2022-11-01 ASSESSMENT — ENCOUNTER SYMPTOMS
NAUSEA: 0
WHEEZING: 1
COUGH: 1
VOMITING: 0
SHORTNESS OF BREATH: 1
BLOOD IN STOOL: 0
SORE THROAT: 1
DIARRHEA: 0
CONSTIPATION: 0

## 2022-11-04 ASSESSMENT — ENCOUNTER SYMPTOMS: CONSTIPATION: 1

## 2022-11-04 NOTE — ED PROVIDER NOTES
[facility]  eMERGENCY dEPARTMENT eNCOUnter          CHIEF COMPLAINT       Chief Complaint   Patient presents with    Constipation       Nurses Notes reviewed and I agree except as noted inthe HPI. HISTORY OF PRESENT ILLNESS    Zoey Brandt is a 69 y.o. female who presents to the Emergency Department for the evaluation of constipation. Patient reports that she has a history of constipation and thinks some recent use of NSAIDs and increased dairy have contributed. She reports that she saw her doctor a few days ago and her last BM was before the appointment, facilitated by use of MOM. She states that she was started on prednisone and antibiotics with improving cough but her constipation is getting worse. Reports sensation of rectal pressure but continues with flatulence and passing of some soft stool Thursday evening after additional MOM. Reports she's tried multiple home enemas (fleets and soap spencer) without relief as well as mineral oil. She performed home digital rectal exam and could feel hard stool. The pharmacy was out of magnesium citrate so she came to the ED for further treatment. She's been using preparation H for hemorrhoids and denies bleeding. Denies any abdominal pain, nausea, or vomiting. She does note occasional GERD symptoms the other day which improved after passing stool. The HPI was provided by the patient. REVIEW OF SYSTEMS     Review of Systems   Gastrointestinal:  Positive for constipation. All other systems reviewed and are negative. PAST MEDICAL HISTORY    has a past medical history of Asthma, Chronic back pain, COPD (chronic obstructive pulmonary disease) ([address] Diverticulosis, Essential hypertension, Hx of blood clots, Obesity, Osteoarthritis, PONV (postoperative nausea and vomiting), and Tobacco abuse. SURGICAL HISTORY      has a past surgical history that includes hernia repair (2008); Knee arthroscopy (1998);  Appendectomy (1990's); Cholecystectomy (1990's); Tonsillectomy and adenoidectomy (1980's); Colonoscopy (2008); Knee arthroscopy (Right, 9/30/15,12/9/15); knee surgery (Right, 05/23/2016); fracture surgery (Left, 10/08/2016); cyst removal (Right, 2021); joint replacement (2016); Foot surgery (Right, 1/19/2022); Cataract removal (Left, 02/09/2022); and Cataract removal (Right, 02/16/2022). CURRENT MEDICATIONS       Discharge Medication List as of 10/30/2022  6:42 PM        CONTINUE these medications which have NOT CHANGED    Details   amoxicillin (AMOXIL) 500 MG capsule TAKE 4 CAPSULES BY MOUTH 1 HOUR PRIOR TO DENTAL APPOINTMENT. Historical Med      predniSONE (DELTASONE) 20 MG tablet Take 1 tablet by mouth 2 times daily for 5 days, Disp-10 tablet, R-0Normal      azithromycin (ZITHROMAX) 250 MG tablet Take 1 tablet by mouth See Admin Instructions for 5 days 500mg on day 1 followed by 250mg on days 2 - 5, Disp-6 tablet, R-0Normal      lisinopril (PRINIVIL;ZESTRIL) 10 MG tablet TAKE 1 TABLET BY MOUTH DAILY, Disp-90 tablet, R-3ZERO refills remain on this prescription. Your patient is requesting advance approval of refills for this medication to [address] [place] [address]      montelukast (SINGULAIR) 10 MG tablet TAKE 1 TABLET BY MOUTH EVERY NIGHT, Disp-90 tablet, R-3Normal      naproxen (NAPROSYN) 250 MG tablet TAKE 1 TABLET BY MOUTH TWICE DAILY AS NEEDED FOR PAIN, Disp-60 tablet, R-1Normal      fexofenadine (ALLEGRA) 180 MG tablet Take 180 mg by mouth in the morning. Historical Med      albuterol sulfate HFA (PROVENTIL;VENTOLIN;PROAIR) 108 (90 Base) MCG/ACT inhaler INHALE 2 PUFFS INTO THE LUNGS EVERY 4 TO 6 HOURS AS NEEDED FOR WHEEZING, Disp-3 each, R-3Normal      Multiple Vitamins-Minerals (OCUVITE PO) Take by mouth dailyHistorical Med      Calcium Citrate-Vitamin D (CALCIUM + D PO) Take by mouth dailyHistorical Med             ALLERGIES     is allergic to flonase [fluticasone propionate] and mucinex d [pseudoephedrine-guaifenesin er].    FAMILY HISTORY     She indicated that her mother is . She indicated that her father is . She indicated that the status of her maternal grandmother is unknown.   family history includes Cancer in her maternal grandmother and mother; Diabetes in her father; Heart Disease in her maternal grandmother; High Blood Pressure in her mother; Stroke in her mother. SOCIAL HISTORY      reports that she has been smoking cigarettes. She has a 30.00 pack-year smoking history. She has never used smokeless tobacco. She reports current alcohol use. She reports that she does not use drugs. PHYSICAL EXAM     INITIAL VITALS:  oral temperature is 97.7 °F (36.5 °C). Her blood pressure is 125/66 and her pulse is 50. Her respiration is 18 and oxygen saturation is 94%. Physical Exam  Vitals and nursing note reviewed. Exam conducted with a chaperone present. HENT:      Head: Normocephalic. Eyes:      Conjunctiva/sclera: Conjunctivae normal.   Cardiovascular:      Rate and Rhythm: Normal rate. Pulmonary:      Effort: Pulmonary effort is normal. No respiratory distress. Abdominal:      Palpations: Abdomen is soft. Tenderness: There is no abdominal tenderness. There is no right CVA tenderness, left CVA tenderness, guarding or rebound. Genitourinary:     Comments: External hemorrhoids noted, without tenderness or bleeding. Palpable hard stool high in the rectal vault. Musculoskeletal:      Cervical back: Normal range of motion. Skin:     General: Skin is warm and dry. Neurological:      General: No focal deficit present. Mental Status: She is alert and oriented to person, place, and time.    Psychiatric:         Mood and Affect: Mood normal.       DIFFERENTIAL DIAGNOSIS:   Differential diagnoses are discussed    DIAGNOSTIC RESULTS     EKG: All EKG's are interpreted by the Emergency Department Physician who either signs or Co-signsthis chart in the absence of a cardiologist.          RADIOLOGY: non-plain film images(s) such as CT, Ultrasound and MRI are read by the radiologist.    XR ABDOMEN (KUB) (SINGLE AP VIEW)   Final Result      Nonobstructive bowel gas pattern. Final report electronically signed by Dr. Marlen Hollis on 10/30/2022 2:24 PM          LABS:      Labs Reviewed   CBC WITH AUTO DIFFERENTIAL - Abnormal; Notable for the following components:       Result Value    WBC 12.9 (*)     Hematocrit 50.4 (*)     MCHC 31.0 (*)     RDW-SD 47.4 (*)     Segs Absolute 11.1 (*)     Immature Grans (Abs) 0.13 (*)     All other components within normal limits   COMPREHENSIVE METABOLIC PANEL W/ REFLEX TO MG FOR LOW K - Abnormal; Notable for the following components:    Chloride 96 (*)     ALT 7 (*)     All other components within normal limits   OSMOLALITY - Abnormal; Notable for the following components:    Osmolality Calc 274.6 (*)     All other components within normal limits   URINALYSIS WITH REFLEX TO CULTURE   TSH WITH REFLEX   GLOMERULAR FILTRATION RATE, ESTIMATED   ANION GAP       EMERGENCY DEPARTMENT COURSE:   Vitals:    Vitals:    10/30/22 1258 10/30/22 1551   BP: (!) 165/74 125/66   Pulse: 59 50   Resp: 18 18   Temp: 97.7 °F (36.5 °C)    TempSrc: Oral    SpO2: 93% 94%      4:47 PM EDT: The patient was seen and evaluated. Patient presents for complaint of constipation. Labs/imaging reassuring. She was treated in the department with anusol suppository and mineral oil enema. No immediate relief with enema and we were in the process of coordinating gastrografin enema when the patient was able to have spontaneous BM with relief of rectal pressure. At this time, she's feeling much better and denies any need for additional treatment. Recommend initiating daily Miralax with probiotic and patient is agreeable. Return precautions discussed prior to discharge. CRITICAL CARE:   None     CONSULTS:  None    PROCEDURES:  None    FINAL IMPRESSION      1.  Constipation, unspecified constipation type          DISPOSITION/PLAN   Discharge    PATIENT REFERRED TO:  Andressa Clifford MD  [address] [id number] [phone]      As needed    [address] [facility] [address] [address] EMERGENCY DEPT  [place] [id number] [phone]    If symptoms worsen      DISCHARGEMEDICATIONS:  Discharge Medication List as of 10/30/2022  6:42 PM          (Please note that portions of this note were completedwith a voice recognition program.  Efforts were made to edit the dictations but occasionally words are mis-transcribed.)        Braulio Ndiaye PA-C  11/04/22 8979

## 2022-11-29 ENCOUNTER — NURSE ONLY (OUTPATIENT)
Dept: LAB | Age: 67
End: 2022-11-29

## 2022-11-29 DIAGNOSIS — I10 ESSENTIAL HYPERTENSION: ICD-10-CM

## 2022-11-29 DIAGNOSIS — R79.9 ABNORMAL FINDING OF BLOOD CHEMISTRY, UNSPECIFIED: ICD-10-CM

## 2022-11-29 DIAGNOSIS — J44.9 CHRONIC OBSTRUCTIVE PULMONARY DISEASE, UNSPECIFIED COPD TYPE (HCC): ICD-10-CM

## 2022-11-29 DIAGNOSIS — R53.83 FATIGUE, UNSPECIFIED TYPE: ICD-10-CM

## 2022-11-29 DIAGNOSIS — E66.01 CLASS 3 SEVERE OBESITY DUE TO EXCESS CALORIES WITH SERIOUS COMORBIDITY AND BODY MASS INDEX (BMI) OF 40.0 TO 44.9 IN ADULT (HCC): ICD-10-CM

## 2022-11-29 DIAGNOSIS — E55.9 VITAMIN D DEFICIENCY, UNSPECIFIED: ICD-10-CM

## 2022-11-29 LAB
ALBUMIN SERPL-MCNC: 4.1 G/DL (ref 3.5–5.1)
ALP BLD-CCNC: 106 U/L (ref 38–126)
ALT SERPL-CCNC: < 5 U/L (ref 11–66)
ANION GAP SERPL CALCULATED.3IONS-SCNC: 13 MEQ/L (ref 8–16)
AST SERPL-CCNC: 17 U/L (ref 5–40)
BASOPHILS # BLD: 0.7 %
BASOPHILS ABSOLUTE: 0.1 THOU/MM3 (ref 0–0.1)
BILIRUB SERPL-MCNC: 0.4 MG/DL (ref 0.3–1.2)
BUN BLDV-MCNC: 10 MG/DL (ref 7–22)
CALCIUM SERPL-MCNC: 9.8 MG/DL (ref 8.5–10.5)
CHLORIDE BLD-SCNC: 99 MEQ/L (ref 98–111)
CHOLESTEROL, TOTAL: 175 MG/DL (ref 100–199)
CO2: 30 MEQ/L (ref 23–33)
CREAT SERPL-MCNC: 0.5 MG/DL (ref 0.4–1.2)
EOSINOPHIL # BLD: 3.8 %
EOSINOPHILS ABSOLUTE: 0.3 THOU/MM3 (ref 0–0.4)
ERYTHROCYTE [DISTWIDTH] IN BLOOD BY AUTOMATED COUNT: 13.5 % (ref 11.5–14.5)
ERYTHROCYTE [DISTWIDTH] IN BLOOD BY AUTOMATED COUNT: 49.4 FL (ref 35–45)
FOLATE: 9.7 NG/ML (ref 4.8–24.2)
GFR SERPL CREATININE-BSD FRML MDRD: > 60 ML/MIN/1.73M2
GLUCOSE BLD-MCNC: 100 MG/DL (ref 70–108)
HCT VFR BLD CALC: 49.4 % (ref 37–47)
HDLC SERPL-MCNC: 57 MG/DL
HEMOGLOBIN: 15.1 GM/DL (ref 12–16)
IMMATURE GRANS (ABS): 0.04 THOU/MM3 (ref 0–0.07)
IMMATURE GRANULOCYTES: 0.5 %
IRON: 73 UG/DL (ref 50–170)
LDL CHOLESTEROL CALCULATED: 99 MG/DL
LYMPHOCYTES # BLD: 19.8 %
LYMPHOCYTES ABSOLUTE: 1.7 THOU/MM3 (ref 1–4.8)
MCH RBC QN AUTO: 29.9 PG (ref 26–33)
MCHC RBC AUTO-ENTMCNC: 30.6 GM/DL (ref 32.2–35.5)
MCV RBC AUTO: 97.8 FL (ref 81–99)
MONOCYTES # BLD: 7.1 %
MONOCYTES ABSOLUTE: 0.6 THOU/MM3 (ref 0.4–1.3)
NUCLEATED RED BLOOD CELLS: 0 /100 WBC
PLATELET # BLD: 294 THOU/MM3 (ref 130–400)
PMV BLD AUTO: 10.7 FL (ref 9.4–12.4)
POTASSIUM SERPL-SCNC: 4.7 MEQ/L (ref 3.5–5.2)
RBC # BLD: 5.05 MILL/MM3 (ref 4.2–5.4)
SEG NEUTROPHILS: 68.1 %
SEGMENTED NEUTROPHILS ABSOLUTE COUNT: 5.7 THOU/MM3 (ref 1.8–7.7)
SODIUM BLD-SCNC: 142 MEQ/L (ref 135–145)
TOTAL IRON BINDING CAPACITY: 322 UG/DL (ref 171–450)
TOTAL PROTEIN: 7.1 G/DL (ref 6.1–8)
TRIGL SERPL-MCNC: 95 MG/DL (ref 0–199)
TSH SERPL DL<=0.05 MIU/L-ACNC: 1.95 UIU/ML (ref 0.4–4.2)
VITAMIN B-12: 431 PG/ML (ref 211–911)
VITAMIN D 25-HYDROXY: 24 NG/ML (ref 30–100)
WBC # BLD: 8.4 THOU/MM3 (ref 4.8–10.8)

## 2022-12-01 DIAGNOSIS — E55.9 VITAMIN D DEFICIENCY: Primary | ICD-10-CM

## 2022-12-07 ENCOUNTER — OFFICE VISIT (OUTPATIENT)
Dept: FAMILY MEDICINE CLINIC | Age: 67
End: 2022-12-07
Payer: MEDICARE

## 2022-12-07 VITALS
HEART RATE: 60 BPM | SYSTOLIC BLOOD PRESSURE: 132 MMHG | BODY MASS INDEX: 38.91 KG/M2 | DIASTOLIC BLOOD PRESSURE: 70 MMHG | HEIGHT: 63 IN | WEIGHT: 219.6 LBS

## 2022-12-07 DIAGNOSIS — Z00.00 MEDICARE ANNUAL WELLNESS VISIT, SUBSEQUENT: Primary | ICD-10-CM

## 2022-12-07 DIAGNOSIS — I10 ESSENTIAL HYPERTENSION: ICD-10-CM

## 2022-12-07 DIAGNOSIS — Z72.0 TOBACCO ABUSE: ICD-10-CM

## 2022-12-07 DIAGNOSIS — E66.01 CLASS 2 SEVERE OBESITY DUE TO EXCESS CALORIES WITH SERIOUS COMORBIDITY AND BODY MASS INDEX (BMI) OF 39.0 TO 39.9 IN ADULT (HCC): ICD-10-CM

## 2022-12-07 PROCEDURE — 1123F ACP DISCUSS/DSCN MKR DOCD: CPT | Performed by: NURSE PRACTITIONER

## 2022-12-07 PROCEDURE — G0439 PPPS, SUBSEQ VISIT: HCPCS | Performed by: NURSE PRACTITIONER

## 2022-12-07 PROCEDURE — 3017F COLORECTAL CA SCREEN DOC REV: CPT | Performed by: NURSE PRACTITIONER

## 2022-12-07 PROCEDURE — 3074F SYST BP LT 130 MM HG: CPT | Performed by: NURSE PRACTITIONER

## 2022-12-07 PROCEDURE — 3078F DIAST BP <80 MM HG: CPT | Performed by: NURSE PRACTITIONER

## 2022-12-07 PROCEDURE — G8484 FLU IMMUNIZE NO ADMIN: HCPCS | Performed by: NURSE PRACTITIONER

## 2022-12-07 ASSESSMENT — PATIENT HEALTH QUESTIONNAIRE - PHQ9
2. FEELING DOWN, DEPRESSED OR HOPELESS: 0
SUM OF ALL RESPONSES TO PHQ QUESTIONS 1-9: 0
SUM OF ALL RESPONSES TO PHQ9 QUESTIONS 1 & 2: 0
SUM OF ALL RESPONSES TO PHQ QUESTIONS 1-9: 0
1. LITTLE INTEREST OR PLEASURE IN DOING THINGS: 0

## 2022-12-07 ASSESSMENT — LIFESTYLE VARIABLES
HOW OFTEN DO YOU HAVE A DRINK CONTAINING ALCOHOL: MONTHLY OR LESS
HOW MANY STANDARD DRINKS CONTAINING ALCOHOL DO YOU HAVE ON A TYPICAL DAY: 3 OR 4

## 2022-12-07 NOTE — PATIENT INSTRUCTIONS
Preventing Falls: Care Instructions  Your Care Instructions     Getting around your home safely can be a challenge if you have injuries or health problems that make it easy for you to fall. Loose rugs and furniture in walkways are among the dangers for many older people who have problems walking or who have poor eyesight. People who have conditions such as arthritis, osteoporosis, or dementia also have to be careful not to fall. You can make your home safer with a few simple measures. Follow-up care is a key part of your treatment and safety. Be sure to make and go to all appointments, and call your doctor if you are having problems. It's also a good idea to know your test results and keep a list of the medicines you take. How can you care for yourself at home? Taking care of yourself  Exercise regularly to improve your strength, muscle tone, and balance. Walk if you can. Swimming may be a good choice if you cannot walk easily. Have your vision and hearing checked each year or any time you notice a change. If you have trouble seeing and hearing, you might not be able to avoid objects and could lose your balance. Know the side effects of the medicines you take. Ask your doctor or pharmacist whether the medicines you take can affect your balance. Sleeping pills or sedatives can affect your balance. Limit the amount of alcohol you drink. Alcohol can impair your balance and other senses. Ask your doctor whether calluses or corns on your feet need to be removed. If you wear loose-fitting shoes because of calluses or corns, you can lose your balance and fall. Talk to your doctor if you have numbness in your feet. You may get dizzy if you do not drink enough water. To prevent dehydration, drink plenty of fluids. Choose water and other clear liquids. If you have kidney, heart, or liver disease and have to limit fluids, talk with your doctor before you increase the amount of fluids you drink.   Preventing falls at home  Remove raised doorway thresholds, throw rugs, and clutter. Repair loose carpet or raised areas in the floor. Move furniture and electrical cords to keep them out of walking paths. Use nonskid floor wax, and wipe up spills right away, especially on ceramic tile floors. If you use a walker or cane, put rubber tips on it. If you use crutches, clean the bottoms of them regularly with an abrasive pad, such as steel wool. Keep your house well lit, especially stairways, porches, and outside walkways. Use night-lights in areas such as hallways and bathrooms. Add extra light switches or use remote switches (such as switches that go on or off when you clap your hands) to make it easier to turn lights on if you have to get up during the night. Install sturdy handrails on stairways. Move items in your cabinets so that the things you use a lot are on the lower shelves (about waist level). Keep a cordless phone and a flashlight with new batteries by your bed. If possible, put a phone in each of the main rooms of your house, or carry a cell phone in case you fall and cannot reach a phone. Or, you can wear a device around your neck or wrist. You push a button that sends a signal for help. Wear low-heeled shoes that fit well and give your feet good support. Use footwear with nonskid soles. Check the heels and soles of your shoes for wear. Repair or replace worn heels or soles. Do not wear socks without shoes on wood floors. Walk on the grass when the sidewalks are slippery. If you live in an area that gets snow and ice in the winter, sprinkle salt on slippery steps and sidewalks. Or ask a family member or friend to do this for you. Preventing falls in the bath  Install grab bars and nonskid mats inside and outside your shower or tub and near the toilet and sinks. Use shower chairs and bath benches. Use a hand-held shower head that will allow you to sit while showering.   Get into a tub or shower by putting the weaker leg in first. Get out of a tub or shower with your strong side first.  Repair loose toilet seats and consider installing a raised toilet seat to make getting on and off the toilet easier. Keep your bathroom door unlocked while you are in the shower. Where can you learn more? Go to https://chpepiceweb.healthDigify. org and sign in to your Informed Tradest account. Enter [id number] in the [facility] box to learn more about \"Preventing Falls: Care Instructions. \"     If you do not have an account, please click on the \"Sign Up Now\" link. Current as of: May 4, 2022               Content Version: 13.4  © 2006-2022 Rome[employer]. Care instructions adapted under license by [facility] ([facility]). If you have questions about a medical condition or this instruction, always ask your healthcare professional. [address] any warranty or liability for your use of this information. Learning About Stress  What is stress? Stress is what you feel when you have to handle more than you are used to. Stress is a fact of life for most people, and it affects everyone differently. What causes stress for you may not be stressful for someone else. A lot of things can cause stress. You may feel stress when you go on a job interview, take a test, or run a race. This kind of short-term stress is normal and even useful. It can help you if you need to work hard or react quickly. For example, stress can help you finish an important job on time. Stress also can last a long time. Long-term stress is caused by stressful situations or events. Examples of long-term stress include long-term health problems, ongoing problems at work, or conflicts in your family. Long-term stress can harm your health. How does stress affect your health? When you are stressed, your body responds as though you are in danger.  It makes hormones that speed up your heart, make you breathe faster, and give you a burst of energy. This is called the fight-or-flight stress response. If the stress is over quickly, your body goes back to normal and no harm is done. But if stress happens too often or lasts too long, it can have bad effects. Long-term stress can make you more likely to get sick, and it can make symptoms of some diseases worse. If you tense up when you are stressed, you may develop neck, shoulder, or low back pain. Stress is linked to high blood pressure and heart disease. Stress also harms your emotional health. It can make you carreon, tense, or depressed. Your relationships may suffer, and you may not do well at work or school. What can you do to manage stress? How to relax your mind   Write. It may help to write about things that are bothering you. This helps you find out how much stress you feel and what is causing it. When you know this, you can find better ways to cope. Let your feelings out. Talk, laugh, cry, and express anger when you need to. Talking with friends, family, a counselor, or a member of the clergy about your feelings is a healthy way to relieve stress. Do something you enjoy. For example, listen to music or go to a movie. Practice your hobby or do volunteer work. Meditate. This can help you relax, because you are not worrying about what happened before or what may happen in the future. Do guided imagery. Imagine yourself in any setting that helps you feel calm. You can use audiotapes, books, or a teacher to guide you. How to relax your body   Do something active. Exercise or activity can help reduce stress. Walking is a great way to get started. Even everyday activities such as housecleaning or yard work can help. Do breathing exercises. For example:  From a standing position, bend forward from the waist with your knees slightly bent. Let your arms dangle close to the floor. Breathe in slowly and deeply as you return to a standing position.  Roll up slowly and lift your head last.  Hold your breath for just a few seconds in the standing position. Breathe out slowly and bend forward from the waist.  Try yoga or aidan chi. These techniques combine exercise and meditation. You may need some training at first to learn them. What can you do to prevent stress? Manage your time. This helps you find time to do the things you want and need to do. Get enough sleep. Your body recovers from the stresses of the day while you are sleeping. Get support. Your family, friends, and community can make a difference in how you experience stress. Where can you learn more? Go to https://Investviewpechanelewmirta.Tiansheng. org and sign in to your Tryouts account. Enter K718 in the [employer] box to learn more about \"Learning About Stress. \"     If you do not have an account, please click on the \"Sign Up Now\" link. Current as of: October 6, 2021               Content Version: 13.4  © 2006-2022 Rizzoma. Care instructions adapted under license by [facility] ([facility]). If you have questions about a medical condition or this instruction, always ask your healthcare professional. [place] [address] any warranty or liability for your use of this information. Learning About Being Active as an Older Adult  Why is being active important as you get older? Being active is one of the best things you can do for your health. And it's never too late to start. Being active--or getting active, if you aren't already--has definite benefits. It can:  Give you more energy,  Keep your mind sharp. Improve balance to reduce your risk of falls. Help you manage chronic illness with fewer medicines. No matter how old you are, how fit you are, or what health problems you have, there is a form of activity that will work for you. And the more physical activity you can do, the better your overall health will be. What kinds of activity can help you stay healthy?   Being more active will make your daily activities easier. Physical activity includes planned exercise and things you do in daily life. There are four types of activity:  Aerobic. Doing aerobic activity makes your heart and lungs strong. Includes walking, dancing, and gardening. Aim for at least 2½ hours spread throughout the week. It improves your energy and can help you sleep better. Muscle-strengthening. This type of activity can help maintain muscle and strengthen bones. Includes climbing stairs, using resistance bands, and lifting or carrying heavy loads. Aim for at least twice a week. It can help protect the knees and other joints. Stretching. Stretching gives you better range of motion in joints and muscles. Includes upper arm stretches, calf stretches, and gentle yoga. Aim for at least twice a week, preferably after your muscles are warmed up from other activities. It can help you function better in daily life. Balancing. This helps you stay coordinated and have good posture. Includes heel-to-toe walking, aidan chi, and certain types of yoga. Aim for at least 3 days a week. It can reduce your risk of falling. Even if you have a hard time meeting the recommendations, it's better to be more active than less active. All activity done in each category counts toward your weekly total. You'd be surprised how daily things like carrying groceries, keeping up with grandchildren, and taking the stairs can add up. What keeps you from being active? If you've had a hard time being more active, you're not alone. Maybe you remember being able to do more. Or maybe you've never thought of yourself as being active. It's frustrating when you can't do the things you want. Being more active can help. What's holding you back? Getting started. Have a goal, but break it into easy tasks. Small steps build into big accomplishments. Staying motivated.   If you feel like skipping your activity, remember your goal. Maybe you want to move better and stay independent. Every activity gets you one step closer. Not feeling your best.  Start with 5 minutes of an activity you enjoy. Prove to yourself you can do it. As you get comfortable, increase your time. You may not be where you want to be. But you're in the process of getting there. Everyone starts somewhere. How can you find safe ways to stay active? Talk with your doctor about any physical challenges you're facing. Make a plan with your doctor if you have a health problem or aren't sure how to get started with activity. If you're already active, ask your doctor if there is anything you should change to stay safe as your body and health change. If you tend to feel dizzy after you take medicine, avoid activity at that time. Try being active before you take your medicine. This will reduce your risk of falls. If you plan to be active at home, make sure to clear your space before you get started. Remove things like TV cords, coffee tables, and throw rugs. It's safest to have plenty of space to move freely. The key to getting more active is to take it slow and steady. Try to improve only a little bit at a time. Pick just one area to improve on at first. And if an activity hurts, stop and talk to your doctor. Where can you learn more? Go to https://deisi.Duetto. org and sign in to your Flubit Limited account. Enter P600 in the Search Health Information box to learn more about \"Learning About Being Active as an Older Adult. \"     If you do not have an account, please click on the \"Sign Up Now\" link. Current as of: January 26, 2022               Content Version: 13.4  © 5883-1652 Healthwise, Incorporated. Care instructions adapted under license by [facility] ([facility]). If you have questions about a medical condition or this instruction, always ask your healthcare professional. [address] any warranty or liability for your use of this information.            Hearing Loss: Care Instructions  Overview     Hearing loss is a sudden or slow decrease in how well you hear. It can range from mild to severe. Permanent hearing loss can occur with aging. It also can happen when you are exposed long-term to loud noise. Examples include listening to loud music, riding motorcycles, or being around other loud machines. Hearing loss can affect your work and home life. It can make you feel lonely or depressed. You may feel that you have lost your independence. But hearing aids and other devices can help you hear better and feel connected to others. Follow-up care is a key part of your treatment and safety. Be sure to make and go to all appointments, and call your doctor if you are having problems. It's also a good idea to know your test results and keep a list of the medicines you take. How can you care for yourself at home? Avoid loud noises whenever possible. This helps keep your hearing from getting worse. Always wear hearing protection around loud noises. Wear a hearing aid as directed. See a professional who can help you pick a hearing aid that fits you. Have hearing tests as your doctor suggests. They can show whether your hearing has changed. Your hearing aid may need to be adjusted. Use other devices as needed. These may include:  Telephone amplifiers and hearing aids that can connect to a television, stereo, radio, or microphone. Devices that use lights or vibrations. These alert you to the doorbell, a ringing telephone, or a baby monitor. Television closed-captioning. This shows the words at the bottom of the screen. Most new TVs can do this. TTY (text telephone). This lets you type messages back and forth on the telephone instead of talking or listening. These devices are also called TDD. When messages are typed on the keyboard, they are sent over the phone line to a receiving TTY. The message is shown on a monitor.   Use text messaging, social media, and email if it is hard for you to communicate by telephone. Try to learn a listening technique called speechreading. It is not lipreading. You pay attention to people's gestures, expressions, posture, and tone of voice. These clues can help you understand what a person is saying. Face the person you are talking to, and have them face you. Make sure the lighting is good. You need to see the other person's face clearly. Think about counseling if you need help to adjust to your hearing loss. When should you call for help? Watch closely for changes in your health, and be sure to contact your doctor if:    You think your hearing is getting worse.     You have new symptoms, such as dizziness or nausea. Where can you learn more? Go to https://MedSolutionspepiceweb.Omise. org and sign in to your LeftRight Studios account. Enter X140 in the [employer] box to learn more about \"Hearing Loss: Care Instructions. \"     If you do not have an account, please click on the \"Sign Up Now\" link. Current as of: May 4, 2022               Content Version: 13.4  © 2006-2022 Healthwise, [employer]. Care instructions adapted under license by [facility] ([facility]). If you have questions about a medical condition or this instruction, always ask your healthcare professional. [place] [address] any warranty or liability for your use of this information. Advance Directives: Care Instructions  Overview  An advance directive is a legal way to state your wishes at the end of your life. It tells your family and your doctor what to do if you can't say what you want. There are two main types of advance directives. You can change them any time your wishes change. Living will. This form tells your family and your doctor your wishes about life support and other treatment. The form is also called a declaration. Medical power of . This form lets you name a person to make treatment decisions for you when you can't speak for yourself.  This person is called a health care agent (health care proxy, health care surrogate). The form is also called a durable power of  for health care. If you do not have an advance directive, decisions about your medical care may be made by a family member, or by a doctor or a  who doesn't know you. It may help to think of an advance directive as a gift to the people who care for you. If you have one, they won't have to make tough decisions by themselves. Follow-up care is a key part of your treatment and safety. Be sure to make and go to all appointments, and call your doctor if you are having problems. It's also a good idea to know your test results and keep a list of the medicines you take. What should you include in an advance directive? Many states have a unique advance directive form. (It may ask you to address specific issues.) Or you might use a universal form that's approved by many states. If your form doesn't tell you what to address, it may be hard to know what to include in your advance directive. Use the questions below to help you get started. Who do you want to make decisions about your medical care if you are not able to? What life-support measures do you want if you have a serious illness that gets worse over time or can't be cured? What are you most afraid of that might happen? (Maybe you're afraid of having pain, losing your independence, or being kept alive by machines.)  Where would you prefer to die? (Your home? A hospital? A nursing home?)  Do you want to donate your organs when you die? Do you want certain Tenriism practices performed before you die? When should you call for help? Be sure to contact your doctor if you have any questions. Where can you learn more? Go to https://[email or web address].Click [email or web address] org and sign in to your RTF Logic account. Enter R264 in the VMwarehire box to learn more about \"Advance Directives: Care Instructions. \"     If you do not have an account, please click on the \"Sign Up Now\" link. Current as of: June 16, 2022               Content Version: 13.4  © 2006-2022 Healthwise, [employer]. Care instructions adapted under license by [facility] ([facility]). If you have questions about a medical condition or this instruction, always ask your healthcare professional. [address] any warranty or liability for your use of this information. Personalized Preventive Plan for [facility] - 12/7/2022  Medicare offers a range of preventive health benefits. Some of the tests and screenings are paid in full while other may be subject to a deductible, co-insurance, and/or copay. Some of these benefits include a comprehensive review of your medical history including lifestyle, illnesses that may run in your family, and various assessments and screenings as appropriate. After reviewing your medical record and screening and assessments performed today your provider may have ordered immunizations, labs, imaging, and/or referrals for you. A list of these orders (if applicable) as well as your Preventive Care list are included within your After Visit Summary for your review. Other Preventive Recommendations:    A preventive eye exam performed by an eye specialist is recommended every 1-2 years to screen for glaucoma; cataracts, macular degeneration, and other eye disorders. A preventive dental visit is recommended every 6 months. Try to get at least 150 minutes of exercise per week or 10,000 steps per day on a pedometer . Order or download the FREE \"Exercise & Physical Activity: Your Everyday Guide\" from [employer]. Call 1-553.803.6936 or search [employer] online. You need 1609-4251 mg of calcium and 7746-6458 IU of vitamin D per day.  It is possible to meet your calcium requirement with diet alone, but a vitamin D supplement is usually necessary to meet this goal.  When exposed to the sun, use a sunscreen that protects against both UVA and UVB radiation with an SPF of 30 or greater. Reapply every 2 to 3 hours or after sweating, drying off with a towel, or swimming. Always wear a seat belt when traveling in a car. Always wear a helmet when riding a bicycle or motorcycle. Preventing Falls: Care Instructions  Your Care Instructions     Getting around your home safely can be a challenge if you have injuries or health problems that make it easy for you to fall. Loose rugs and furniture in walkways are among the dangers for many older people who have problems walking or who have poor eyesight. People who have conditions such as arthritis, osteoporosis, or dementia also have to be careful not to fall. You can make your home safer with a few simple measures. Follow-up care is a key part of your treatment and safety. Be sure to make and go to all appointments, and call your doctor if you are having problems. It's also a good idea to know your test results and keep a list of the medicines you take. How can you care for yourself at home? Taking care of yourself  · Exercise regularly to improve your strength, muscle tone, and balance. Walk if you can. Swimming may be a good choice if you cannot walk easily. · Have your vision and hearing checked each year or any time you notice a change. If you have trouble seeing and hearing, you might not be able to avoid objects and could lose your balance. · Know the side effects of the medicines you take. Ask your doctor or pharmacist whether the medicines you take can affect your balance. Sleeping pills or sedatives can affect your balance. · Limit the amount of alcohol you drink. Alcohol can impair your balance and other senses. · Ask your doctor whether calluses or corns on your feet need to be removed. If you wear loose-fitting shoes because of calluses or corns, you can lose your balance and fall.   · Talk to your doctor if you have numbness in your feet.  · You may get dizzy if you do not drink enough water. To prevent dehydration, drink plenty of fluids. Choose water and other clear liquids. If you have kidney, heart, or liver disease and have to limit fluids, talk with your doctor before you increase the amount of fluids you drink. Preventing falls at home  · Remove raised doorway thresholds, throw rugs, and clutter. Repair loose carpet or raised areas in the floor. · Move furniture and electrical cords to keep them out of walking paths. · Use nonskid floor wax, and wipe up spills right away, especially on ceramic tile floors. · If you use a walker or cane, put rubber tips on it. If you use crutches, clean the bottoms of them regularly with an abrasive pad, such as steel wool. · Keep your house well lit, especially [place], and outside walkways. Use night-lights in areas such as hallways and bathrooms. Add extra light switches or use remote switches (such as switches that go on or off when you clap your hands) to make it easier to turn lights on if you have to get up during the night. · Install sturdy handrails on stairways. · Move items in your cabinets so that the things you use a lot are on the lower shelves (about waist level). · Keep a cordless phone and a flashlight with new batteries by your bed. If possible, put a phone in each of the main rooms of your house, or carry a cell phone in case you fall and cannot reach a phone. Or, you can wear a device around your neck or wrist. You push a button that sends a signal for help. · Wear low-heeled shoes that fit well and give your feet good support. Use footwear with nonskid soles. Check the heels and soles of your shoes for wear. Repair or replace worn heels or soles. · Do not wear socks without shoes on wood floors. · Walk on the grass when the sidewalks are slippery. If you live in an area that gets snow and ice in the winter, sprinkle salt on slippery steps and sidewalks.  Or ask a family member or friend to do this for you. Preventing falls in the bath  · Install grab bars and nonskid mats inside and outside your shower or tub and near the toilet and sinks. · Use shower chairs and bath benches. · Use a hand-held shower head that will allow you to sit while showering. · Get into a tub or shower by putting the weaker leg in first. Get out of a tub or shower with your strong side first.  · Repair loose toilet seats and consider installing a raised toilet seat to make getting on and off the toilet easier. · Keep your bathroom door unlocked while you are in the shower. Where can you learn more? Go to https://Metagenics.InGameNow. org and sign in to your [email or web address] account. Enter [id number] in the [facility] box to learn more about \"Preventing Falls: Care Instructions. \"     If you do not have an account, please click on the \"Sign Up Now\" link. Current as of: May 4, 2022               Content Version: 13.4  © 1299-8646 [employer]. Care instructions adapted under license by [facility] ([facility]). If you have questions about a medical condition or this instruction, always ask your healthcare professional. [address] any warranty or liability for your use of this information. Learning About Stress  What is stress? Stress is what you feel when you have to handle more than you are used to. Stress is a fact of life for most people, and it affects everyone differently. What causes stress for you may not be stressful for someone else. A lot of things can cause stress. You may feel stress when you go on a job interview, take a test, or run a race. This kind of short-term stress is normal and even useful. It can help you if you need to work hard or react quickly. For example, stress can help you finish an important job on time. Stress also can last a long time. Long-term stress is caused by stressful situations or events.  Examples of long-term stress include long-term health problems, ongoing problems at work, or conflicts in your family. Long-term stress can harm your health. How does stress affect your health? When you are stressed, your body responds as though you are in danger. It makes hormones that speed up your heart, make you breathe faster, and give you a burst of energy. This is called the fight-or-flight stress response. If the stress is over quickly, your body goes back to normal and no harm is done. But if stress happens too often or lasts too long, it can have bad effects. Long-term stress can make you more likely to get sick, and it can make symptoms of some diseases worse. If you tense up when you are stressed, you may develop neck, shoulder, or low back pain. Stress is linked to high blood pressure and heart disease. Stress also harms your emotional health. It can make you carreon, tense, or depressed. Your relationships may suffer, and you may not do well at work or school. What can you do to manage stress? How to relax your mind   · Write. It may help to write about things that are bothering you. This helps you find out how much stress you feel and what is causing it. When you know this, you can find better ways to cope. · Let your feelings out. Talk, laugh, cry, and express anger when you need to. Talking with friends, family, a counselor, or a member of the clergy about your feelings is a healthy way to relieve stress. · Do something you enjoy. For example, listen to music or go to a movie. Practice your hobby or do volunteer work. · Meditate. This can help you relax, because you are not worrying about what happened before or what may happen in the future. · Do guided imagery. Imagine yourself in any setting that helps you feel calm. You can use audiotapes, books, or a teacher to guide you. How to relax your body   · Do something active. Exercise or activity can help reduce stress. Walking is a great way to get started.  Even everyday activities such as housecleaning or yard work can help. · Do breathing exercises. For example:  ? From a standing position, bend forward from the waist with your knees slightly bent. Let your arms dangle close to the floor. ? Breathe in slowly and deeply as you return to a standing position. Roll up slowly and lift your head last.  ? Hold your breath for just a few seconds in the standing position. ? Breathe out slowly and bend forward from the waist.  · Try yoga or aidan chi. These techniques combine exercise and meditation. You may need some training at first to learn them. What can you do to prevent stress? · Manage your time. This helps you find time to do the things you want and need to do. · Get enough sleep. Your body recovers from the stresses of the day while you are sleeping. · Get support. Your family, friends, and community can make a difference in how you experience stress. Where can you learn more? Go to https://McKinnon & ClarkepeWavebreak Media.Mpax. org and sign in to your [employer] account. Enter J110 in the [facility] box to learn more about \"Learning About Stress. \"     If you do not have an account, please click on the \"Sign Up Now\" link. Current as of: October 6, 2021               Content Version: 13.4  © 2006-2022 Healthwise, Incorporated. Care instructions adapted under license by [facility] ([facility]). If you have questions about a medical condition or this instruction, always ask your healthcare professional. [place] [address] any warranty or liability for your use of this information. Learning About Being Active as an Older Adult  Why is being active important as you get older? Being active is one of the best things you can do for your health. And it's never too late to start. Being active--or getting active, if you aren't already--has definite benefits. It can:  · Give you more energy,  · Keep your mind sharp.   · Improve balance to reduce your risk of falls. · Help you manage chronic illness with fewer medicines. No matter how old you are, how fit you are, or what health problems you have, there is a form of activity that will work for you. And the more physical activity you can do, the better your overall health will be. What kinds of activity can help you stay healthy? Being more active will make your daily activities easier. Physical activity includes planned exercise and things you do in daily life. There are four types of activity:  Aerobic. Doing aerobic activity makes your heart and lungs strong. · Includes walking, dancing, and gardening. · Aim for at least 2½ hours spread throughout the week. · It improves your energy and can help you sleep better. Muscle-strengthening. This type of activity can help maintain muscle and strengthen bones. · Includes climbing stairs, using resistance bands, and lifting or carrying heavy loads. · Aim for at least twice a week. · It can help protect the knees and other joints. Stretching. Stretching gives you better range of motion in joints and muscles. · Includes upper arm stretches, calf stretches, and gentle yoga. · Aim for at least twice a week, preferably after your muscles are warmed up from other activities. · It can help you function better in daily life. Balancing. This helps you stay coordinated and have good posture. · Includes heel-to-toe walking, aidan chi, and certain types of yoga. · Aim for at least 3 days a week. · It can reduce your risk of falling. Even if you have a hard time meeting the recommendations, it's better to be more active than less active. All activity done in each category counts toward your weekly total. You'd be surprised how daily things like carrying groceries, keeping up with grandchildren, and taking the stairs can add up. What keeps you from being active? If you've had a hard time being more active, you're not alone. Maybe you remember being able to do more. Or maybe you've never thought of yourself as being active. It's frustrating when you can't do the things you want. Being more active can help. What's holding you back? Getting started. Have a goal, but break it into easy tasks. Small steps build into big accomplishments. Staying motivated. If you feel like skipping your activity, remember your goal. Maybe you want to move better and stay independent. Every activity gets you one step closer. Not feeling your best.  Start with 5 minutes of an activity you enjoy. Prove to yourself you can do it. As you get comfortable, increase your time. You may not be where you want to be. But you're in the process of getting there. Everyone starts somewhere. How can you find safe ways to stay active? Talk with your doctor about any physical challenges you're facing. Make a plan with your doctor if you have a health problem or aren't sure how to get started with activity. If you're already active, ask your doctor if there is anything you should change to stay safe as your body and health change. If you tend to feel dizzy after you take medicine, avoid activity at that time. Try being active before you take your medicine. This will reduce your risk of falls. If you plan to be active at home, make sure to clear your space before you get started. Remove things like TV cords, coffee tables, and throw rugs. It's safest to have plenty of space to move freely. The key to getting more active is to take it slow and steady. Try to improve only a little bit at a time. Pick just one area to improve on at first. And if an activity hurts, stop and talk to your doctor. Where can you learn more? Go to https://deisi.Flanagan [employer]. org and sign in to your Copper[employer] account. Enter P600 in the Search Health Information box to learn more about \"Learning About Being Active as an Older Adult. \"     If you do not have an account, please click on the \"Sign Up Now\" link.   Current as of: January 26, 2022               Content Version: 13.4  © 2006-2022 Healthwise, Going My Way. Care instructions adapted under license by [facility] ([facility]). If you have questions about a medical condition or this instruction, always ask your healthcare professional. [address] any warranty or liability for your use of this information. Hearing Loss: Care Instructions  Overview     Hearing loss is a sudden or slow decrease in how well you hear. It can range from mild to severe. Permanent hearing loss can occur with aging. It also can happen when you are exposed long-term to loud noise. Examples include listening to loud music, riding motorcycles, or being around other loud machines. Hearing loss can affect your work and home life. It can make you feel lonely or depressed. You may feel that you have lost your independence. But hearing aids and other devices can help you hear better and feel connected to others. Follow-up care is a key part of your treatment and safety. Be sure to make and go to all appointments, and call your doctor if you are having problems. It's also a good idea to know your test results and keep a list of the medicines you take. How can you care for yourself at home? · Avoid loud noises whenever possible. This helps keep your hearing from getting worse. · Always wear hearing protection around loud noises. · Wear a hearing aid as directed. See a professional who can help you pick a hearing aid that fits you. · Have hearing tests as your doctor suggests. They can show whether your hearing has changed. Your hearing aid may need to be adjusted. · Use other devices as needed. These may include:  ? Telephone amplifiers and hearing aids that can connect to a television, stereo, radio, or microphone. ? Devices that use lights or vibrations. These alert you to the doorbell, a ringing telephone, or a baby monitor. ? Television closed-captioning.  This shows the words at the bottom of the screen. Most new TVs can do this. ? TTY (text telephone). This lets you type messages back and forth on the telephone instead of talking or listening. These devices are also called TDD. When messages are typed on the keyboard, they are sent over the phone line to a receiving TTY. The message is shown on a monitor. · Use text messaging, social media, and email if it is hard for you to communicate by telephone. · Try to learn a listening technique called speechreading. It is not lipreading. You pay attention to people's gestures, expressions, posture, and tone of voice. These clues can help you understand what a person is saying. Face the person you are talking to, and have them face you. Make sure the lighting is good. You need to see the other person's face clearly. · Think about counseling if you need help to adjust to your hearing loss. When should you call for help? Watch closely for changes in your health, and be sure to contact your doctor if:    · You think your hearing is getting worse.     · You have new symptoms, such as dizziness or nausea. Where can you learn more? Go to https://Hipcricketpepiceweb.Tapru. org and sign in to your [employer] account. Enter V953 in the [employer] box to learn more about \"Hearing Loss: Care Instructions. \"     If you do not have an account, please click on the \"Sign Up Now\" link. Current as of: May 4, 2022               Content Version: 13.4  © 2006-2022 Healthwise, Incorporated. Care instructions adapted under license by [facility] ([facility]). If you have questions about a medical condition or this instruction, always ask your healthcare professional. [place] [address] any warranty or liability for your use of this information. Advance Directives: Care Instructions  Overview  An advance directive is a legal way to state your wishes at the end of your life.  It tells your family and your doctor what to do if you can't say what you want. There are two main types of advance directives. You can change them any time your wishes change. Living will. This form tells your family and your doctor your wishes about life support and other treatment. The form is also called a declaration. Medical power of . This form lets you name a person to make treatment decisions for you when you can't speak for yourself. This person is called a health care agent (health care proxy, health care surrogate). The form is also called a durable power of  for health care. If you do not have an advance directive, decisions about your medical care may be made by a family member, or by a doctor or a  who doesn't know you. It may help to think of an advance directive as a gift to the people who care for you. If you have one, they won't have to make tough decisions by themselves. Follow-up care is a key part of your treatment and safety. Be sure to make and go to all appointments, and call your doctor if you are having problems. It's also a good idea to know your test results and keep a list of the medicines you take. What should you include in an advance directive? Many states have a unique advance directive form. (It may ask you to address specific issues.) Or you might use a universal form that's approved by many states. If your form doesn't tell you what to address, it may be hard to know what to include in your advance directive. Use the questions below to help you get started. · Who do you want to make decisions about your medical care if you are not able to? · What life-support measures do you want if you have a serious illness that gets worse over time or can't be cured? · What are you most afraid of that might happen? (Maybe you're afraid of having pain, losing your independence, or being kept alive by machines.)  · Where would you prefer to die? (Your home?  A hospital? A nursing home?)  · Do you want to donate your organs when you die? · Do you want certain Confucianism practices performed before you die? When should you call for help? Be sure to contact your doctor if you have any questions. Where can you learn more? Go to https://chpehuong.Green Genes. org and sign in to your [employer] account. Enter R264 in the [facility] box to learn more about \"Advance Directives: Care Instructions. \"     If you do not have an account, please click on the \"Sign Up Now\" link. Current as of: June 16, 2022               Content Version: 13.4  © 1525-5338 Healthwise, [employer]ann[employer]. Care instructions adapted under license by [facility] ([facility]). If you have questions about a medical condition or this instruction, always ask your healthcare professional. [address] any warranty or liability for your use of this information. Personalized Preventive Plan for [facility] - 12/7/2022  Medicare offers a range of preventive health benefits. Some of the tests and screenings are paid in full while other may be subject to a deductible, co-insurance, and/or copay. Some of these benefits include a comprehensive review of your medical history including lifestyle, illnesses that may run in your family, and various assessments and screenings as appropriate. After reviewing your medical record and screening and assessments performed today your provider may have ordered immunizations, labs, imaging, and/or referrals for you. A list of these orders (if applicable) as well as your Preventive Care list are included within your After Visit Summary for your review. Other Preventive Recommendations:    A preventive eye exam performed by an eye specialist is recommended every 1-2 years to screen for glaucoma; cataracts, macular degeneration, and other eye disorders. A preventive dental visit is recommended every 6 months.   Try to get at least 150 minutes of exercise per week or 10,000 steps per day on a pedometer . Order or download the FREE \"Exercise & Physical Activity: Your Everyday Guide\" from [employer]. Call 2-208.336.6653 or search [employer] online. You need 8109-9157 mg of calcium and 9495-6295 IU of vitamin D per day. It is possible to meet your calcium requirement with diet alone, but a vitamin D supplement is usually necessary to meet this goal.  When exposed to the sun, use a sunscreen that protects against both UVA and UVB radiation with an SPF of 30 or greater. Reapply every 2 to 3 hours or after sweating, drying off with a towel, or swimming. Always wear a seat belt when traveling in a car. Always wear a helmet when riding a bicycle or motorcycle.

## 2022-12-07 NOTE — PROGRESS NOTES
Medicare Annual Wellness Visit  Chief Complaint   Patient presents with    Medicare AWV     Pt has a few medical questions that she would like to discuss. Discuss Labs     Completed 11/29/22. Cherry Sellers is here for Medicare AWV (Pt has a few medical questions that she would like to discuss.) and Discuss Labs (Completed 11/29/22.)    Assessment & Plan   Medicare annual wellness visit, subsequent  Tobacco abuse  -     [facility]) Medication Mgmt (Smoking Cessation - Clinical Pharmacy) - [facility]  Essential hypertension  Class 2 severe obesity due to excess calories with serious comorbidity and body mass index (BMI) of 39.0 to 39.9 in adult [facility])      Recommendations for Preventive Services Due: see orders and patient instructions/AVS.  Recommended screening schedule for the next 5-10 years is provided to the patient in written form: see Patient Instructions/AVS.     Return for Medicare Annual Wellness Visit in 1 year. Subjective   The following acute and/or chronic problems were also addressed today:  Pt has questions about her vitamin D level. Takes calcium/vitamin D daily already. Patient's complete Health Risk Assessment and screening values have been reviewed and are found in Flowsheets. The following problems were reviewed today and where indicated follow up appointments were made and/or referrals ordered.     Positive Risk Factor Screenings with Interventions:    Fall Risk:  Do you feel unsteady or are you worried about falling? : (!) yes  2 or more falls in past year?: no  Fall with injury in past year?: no     Interventions:    Fall precautions discussed  See AVS for additional education material  See A/P for any pertinent orders      Alcohol Screening:  Alcohol Use: Heavy Drinker    Frequency of Alcohol Consumption: Monthly or less    Average Number of Drinks: 3 or 4    Frequency of Binge Drinking: Less than monthly      AUDIT-C Score: 3        Interpretation of AUDIT-C score:   3-7 indicates potential alcohol risk. 8 or more is associated with harmful or hazardous drinking. 15 or more in women, and 15 or more in men, is likely to indicate alcohol dependence. General HRA Questions:  Select all that apply: (!) Stress    Stress Interventions:  Related to home improvement needs  See AVS for additional education material  See A/P for any pertinent orders       Weight and Activity:  Physical Activity: Inactive    Days of Exercise per Week: 0 days    Minutes of Exercise per Session: 0 min     On average, how many days per week do you engage in moderate to strenuous exercise (like a brisk walk)?: 0 days  Have you lost any weight without trying in the past 3 months?: No  Body mass index: (!) 39.52    Obesity Interventions:  Patient comments: pt to increase physical activity as able. Does have bad knees. See AVS for additional education material  See A/P for any pertinent orders    Inactivity Interventions:  Pt to increase activity as able but has knee pain chronic  See AVS for additional education material  See A/P for any pertinent orders            Tobacco Use:  Tobacco Use: High Risk    Smoking Tobacco Use: Every Day    Smokeless Tobacco Use: Never    Passive Exposure: Not on file     E-cigarette/Vaping       Questions Responses    E-cigarette/Vaping Use Former User    Start Date     Passive Exposure     Quit Date     Counseling Given     Comments           Interventions:  Willing to be referred to Smoking cessation            Objective   Vitals:    12/07/22 1300   BP: 132/70   Pulse: 60   Weight: 219 lb 9.6 oz (99.6 kg)   Height: 5' 2.5\" (1.588 m)      Body mass index is 39.53 kg/m².         Physical Exam  General Appearance: alert and oriented to person, place and time, well-developed and well-nourished, in no acute distress, alert and oriented to person, place and time, well-developed and well nourished, and in no acute distress  Skin: warm and dry, no rash or erythema and warm and dry  Head: normocephalic and atraumatic  Eyes: pupils equal, round, and reactive to light, extraocular eye movements intact, conjunctivae normal  ENT: tympanic membrane, external ear and ear canal normal bilaterally, oropharynx clear and moist with normal mucous membranes  Neck: neck supple and non tender without mass, no thyromegaly or thyroid nodules, no cervical lymphadenopathy   Pulmonary/Chest: clear to auscultation bilaterally- no wheezes, rales or rhonchi, normal air movement, no respiratory distress  Cardiovascular: normal rate, normal S1 and S2, no gallops, intact distal pulses, and no carotid bruits  Abdomen: soft, non-tender, non-distended, normal bowel sounds, no masses or organomegaly  Extremities: no cyanosis and no clubbing  Musculoskeletal: normal range of motion, no joint swelling, deformity or tenderness  Neurologic: gait and coordination normal and speech normal       Allergies   Allergen Reactions    Flonase [Fluticasone Propionate] Other (See Comments)     Causes Epistaxis    Mucinex D [Pseudoephedrine-Guaifenesin Er] Other (See Comments)     Doesn't work, makes congestion worse. Prior to Visit Medications    Medication Sig Taking? Authorizing Provider   lisinopril (PRINIVIL;ZESTRIL) 10 MG tablet TAKE 1 TABLET BY MOUTH DAILY Yes Bela West MD   montelukast (SINGULAIR) 10 MG tablet TAKE 1 TABLET BY MOUTH EVERY NIGHT Yes GAETANO Lanza - CNP   naproxen (NAPROSYN) 250 MG tablet TAKE 1 TABLET BY MOUTH TWICE DAILY AS NEEDED FOR PAIN Yes Bela West MD   fexofenadine (ALLEGRA) 180 MG tablet Take 180 mg by mouth in the morning.  Yes Historical Provider, MD   albuterol sulfate HFA (PROVENTIL;VENTOLIN;PROAIR) 108 (90 Base) MCG/ACT inhaler INHALE 2 PUFFS INTO THE LUNGS EVERY 4 TO 6 HOURS AS NEEDED FOR WHEEZING Yes Bela West MD   Multiple Vitamins-Minerals (OCUVITE PO) Take by mouth daily Yes Historical Provider, MD   Calcium Citrate-Vitamin D (CALCIUM + D PO) Take by mouth daily Yes Historical Provider, MD Montano (Including outside providers/suppliers regularly involved in providing care):   Patient Care Team:  Miley Lara MD as PCP - General (Family Medicine)  Miley Lara MD as PCP - [facility]     Reviewed and updated this visit:  Tobacco  Allergies  Meds  Problems  Med Hx  Surg Hx  Soc Hx  Fam Hx        Component      Latest Ref Rng & Units 11/29/2022   WBC      4.8 - 10.8 thou/mm3 8.4   RBC      4.20 - 5.40 mill/mm3 5.05   Hemoglobin Quant      12.0 - 16.0 gm/dl 15.1   Hematocrit      37.0 - 47.0 % 49.4 (H)   MCV      81.0 - 99.0 fL 97.8   MCH      26.0 - 33.0 pg 29.9   MCHC      32.2 - 35.5 gm/dl 30.6 (L)   RDW-CV      11.5 - 14.5 % 13.5   RDW-SD      35.0 - 45.0 fL 49.4 (H)   Platelet Count      642 - 400 thou/mm3 294   MPV      9.4 - 12.4 fL 10.7   Seg Neutrophils      % 68.1   Lymphocytes      % 19.8   Monocytes      % 7.1   Eosinophils      % 3.8   Basophils      % 0.7   Immature Granulocytes      % 0.5   Segs Absolute      1.8 - 7.7 thou/mm3 5.7   Lymphocytes Absolute      1.0 - 4.8 thou/mm3 1.7   Monocytes Absolute      0.4 - 1.3 thou/mm3 0.6   Eosinophils Absolute      0.0 - 0.4 thou/mm3 0.3   Basophils Absolute      0.0 - 0.1 thou/mm3 0.1   Immature Grans (Abs)      0.00 - 0.07 thou/mm3 0.04   Nucleated Red Blood Cells      /100 wbc 0   Glucose, Random      70 - 108 mg/dL 100   Creatinine      0.4 - 1.2 mg/dL 0.5   BUN,BUNPL      7 - 22 mg/dL 10   Sodium      135 - 145 meq/L 142   Potassium      3.5 - 5.2 meq/L 4.7   Chloride      98 - 111 meq/L 99   CO2      23 - 33 meq/L 30   CALCIUM, SERUM, [id number]      8.5 - 10.5 mg/dL 9.8   AST      5 - 40 U/L 17   Alk Phos      38 - 126 U/L 106   Total Protein      6.1 - 8.0 g/dL 7.1   Albumin      3.5 - 5.1 g/dL 4.1   Bilirubin      0.3 - 1.2 mg/dL 0.4   ALT      11 - 66 U/L <5 (L)   CHOLESTEROL, TOTAL, [id number]      100 - 199 mg/dL 175   Triglycerides      0 - 199 mg/dL 95   HDL Cholesterol      mg/dL 57   LDL Calculated      mg/dL 99   Vitamin B-12      211 - 911 pg/mL 431   FOLATE, FOLAT      4.8 - 24.2 ng/mL 9.7   Iron      50 - 170 ug/dL 73   TIBC      171 - 450 ug/dL 322   Vit D, 25-Hydroxy      30 - 100 ng/ml 24 (L)   TSH      0.400 - 4.200 uIU/mL 1.950   Est, Glom Filt Rate      >60 ml/min/1.73m2 >60   Anion Gap      8.0 - 16.0 meq/L 13.0        Encouraged annual FLU VACCINE   Encouraged NEW SHINGLES SHOT [facility]) booster- check with insurance re coverage and location of administration. Encouraged TETANUS SHOT (TdaP/ ADACEL/ [address]) per personal pharmacy. Colonoscopy completed 8/1/19. Next in 2024. Mammo completed 9/23/22  Dexa - pt will call when she is ready  Labs - pt has order for repeat Vitamin D. She will add on Vitamin D3 2,000 IU.   Declines CT lungs at this time  Continue current medications  Refills - none needed today  Smoking cessation ordered - please do not call prior to 9 am.  Follow up in 6 months

## 2022-12-12 DIAGNOSIS — M17.0 PRIMARY OSTEOARTHRITIS OF BOTH KNEES: ICD-10-CM

## 2022-12-12 RX ORDER — NAPROXEN 250 MG/1
TABLET ORAL
Qty: 60 TABLET | Refills: 1 | Status: SHIPPED | OUTPATIENT
Start: 2022-12-12

## 2022-12-12 NOTE — TELEPHONE ENCOUNTER
This medication refill is regarding a electronic request. Refill requested by  [employer] . Requested Prescriptions     Pending Prescriptions Disp Refills    naproxen (NAPROSYN) 250 MG tablet [Pharmacy Med Name: NAPROXEN 250MG TABLETS] 60 tablet 1     Sig: TAKE 1 TABLET BY MOUTH TWICE DAILY AS NEEDED FOR PAIN     Date of last visit: 12/7/2022   Date of next visit: None  Date of last refill: 8/12/22 #60/1    Rx verified, ordered and set to EP.

## 2022-12-14 ENCOUNTER — TELEPHONE (OUTPATIENT)
Dept: PHARMACY | Age: 67
End: 2022-12-14

## 2022-12-14 NOTE — TELEPHONE ENCOUNTER
Referral to [facility]. [facility] received from LENA Flores CNP for Smoking Cessation Counseling and Medication Management per Consult Agreement. Called patient, she answered but stated this was not a good time to talk due to she was driving. Pt asked if we could call back another day and I told we would call back next Wednesday as we are only in the office on Wednesday and pt was good with that.

## 2022-12-21 NOTE — TELEPHONE ENCOUNTER
Called patient, explained program.  She states she has started weaning her cigarette use, is interested in assistance.   Initial appt scheduled for 1/4 at 2:15pm.

## 2023-01-04 ENCOUNTER — HOSPITAL ENCOUNTER (OUTPATIENT)
Dept: PHARMACY | Age: 68
Setting detail: THERAPIES SERIES
Discharge: HOME | End: 2023-01-04

## 2023-01-04 NOTE — PROGRESS NOTES
Medication Management   [facility]. [facility]  Smoking Cessation Clinic  [phone] (phone)  [phone] (fax)    Eliazar Guido is a 69 y.o. female has been referred to our service by Sreedhar Montesinos CNP for Smoking Cessation Counseling and Medication Management per Consult Agreement. Patient acknowledges working in consult agreement with clinical pharmacist and referring physician. Verbal consent to treat obtained over the phone and also got verbal consent over phone for pharmacist to prescribe meds for smoking cessation as per signed CPA. Pt reports they are ready and motivated to quit. Encounter completed via telephone. History:  Family/friends for support: Pt lives alone. No children but has friends for support. Career: Pt is retired. Home environment/smoke free zones in house: Yes, only smokes in front room and kitchen. Past Medical history/diagnosis reviewed:  Yes  Medication list reviewed: Yes    Past Medical History:      Past Medical History:   Diagnosis Date    Asthma     Chronic back pain     COPD (chronic obstructive pulmonary disease) ([facility] [address]     Diverticulosis     Essential hypertension 11/5/2019    Hx of blood clots 1970    leg    Obesity     Osteoarthritis     PONV (postoperative nausea and vomiting)     with knee replacement only and patient thinks that was from the nerve block    Tobacco abuse          Scaling:  Importance level: 9  Confidence level: 7    Fagerstrom Test:    How soon after you wake up do you smoke your first cigarette?   6-30\"(2)      Do you find it difficult to refrain from smoking in places where it is forbidden, e.g., in [employer], at [employer], cinema? No (1 if yes)   Which cigarette would you hate to most give up? the first one in the morning(1)   How many cigarettes per day do you smoke? 11-20(1)   Do you smoke more frequently during the first hours of waking than during the rest of the day?  Yes (1 if yes)   Do you smoke if you are so ill that you are in bed most of the day? No (1 if yes)    Classification of Dependence:  0-2 Very Low  3-4 Low  5    Moderate  6-7 High  8-10 Very High   Score: 5, I went over the results with the pt. Tobacco use:  Current use: smokes menthol cigarettes. #/packs per day: 0.66PPD  Age started: 23   Years used: 46  Pack years: 46  How many times in the past have you made a serious attempt to quit smoking? 1  What was the longest period of time you were able to quit smoking? Was not able to quit but got down to 9 cigarettes a day  When was your most recent quit attempt and for how long were you quit? In 2020  What methods have you used in the past when you tried to quit smoking? () cold turkey, () taper down, () hypnosis, () acupuncture, (x) medication, () counseling, () other   The pt has used the following medications in the past to assist with tobacco cessation. Nicotine Patch - Yes   Nicotine Gum - Yes   Nicotine Inhaler - No   Nicotine Lozenge - Yes   Zyban - No   Chantix - No    Second hand smoke exposure:  Where are you exposed to second hand smoke? () home, () work, (x) social events, () vehicle, () live with another smoker, () not exposed, () other       ASSESSMENT/PLAN:  Patient is ready and motivated to quit smoking. Will start counseling on Wednesday, January 11th  Quit date set? No.  If no, I explained to pt that it is OK to not have a quit date set yet and that it is good to gather information and get a plan together and then set a quit date. Reviewed options for pharmacological therapy including directions for use, benefits, and possible side effects  Chantix   -  Days 1-3: 0.5 mg once daily  -  Days 4-7: 0.5 mg twice daily  -  Days >8: 1 mg twice daily for 11 weeks; may consider a dose reduction if not tolerated.   -  Side effects: nausea, abnormal dreams, neuropsychiatric symptoms (rare)  -  Fixed Quit Approach: Quit on day 8, continue Chantix for 12 weeks total  -  Flexible Quit Approach: Quit between days 8-35, continue Chantix for 12 weeks total  -  Gradual Quit Approach: Gradually cut down on smoking with the goal of quitting by week 12, continue Chantix for 24 weeks. Following actions suggested:   Start to identify triggers  Look over pages 1-7 in smoking cessation booklet. Notes/goals for the next week. After our discussion the pt is wanting to use Chantix. I will send note to pharmacist, [facility], to send px for Chantix to pts pharmacy, [facility] on [place]. I did tell pt that if the pharmacist has any questions for her she will call her before sending in the px. Pt understood. Pt is considered about weight gain with her smoking cessation  and I told her we will be discussing this t/o the program.       Discussed the following:  Nicotine replacement and pharmacological options - went over all the options available and specifically went over chantix use and side effects as this is what the pt wants to use. Standard written patient education provided to pt: We had previously mailed all the information to the pt and the pt states she got the packet in the mail. Pt given Smoking Cessation education booklet? Yes  Pt given a \"1-800-QUIT-NOW\" card? Yes      Next appointment: Wednesday, January 11th at [phone] by phone    [facility] verbalized understanding of the above information and denied further questions or concerns. Charges dropped? No  If no, why? Due to appt done over the phone. The total time spent with the pt was 46 minutes.

## 2023-01-09 RX ORDER — VARENICLINE TARTRATE 1 MG/1
1 TABLET, FILM COATED ORAL 2 TIMES DAILY
Qty: 60 TABLET | Refills: 1 | Status: SHIPPED | OUTPATIENT
Start: 2023-02-06

## 2023-01-09 RX ORDER — VARENICLINE TARTRATE
KIT
Qty: 1 EACH | Refills: 0 | Status: SHIPPED | OUTPATIENT
Start: 2023-01-09

## 2023-01-11 ENCOUNTER — HOSPITAL ENCOUNTER (OUTPATIENT)
Dept: PHARMACY | Age: 68
Setting detail: THERAPIES SERIES
Discharge: HOME | End: 2023-01-11

## 2023-01-11 NOTE — PROGRESS NOTES
Medication Management   [facility]. [facility]  Smoking [facility]essation Clinic  [phone] (phone)  [phone] (fax)  Ayleen List          1955  MD Zoey Curran Naomie Flores is a 69 y.o. female was called today for visit 2/12. Previous Goal/Quit Date: none                       New Goal/Quit Date: none    Scaling:  Importance level:  9 (previous level was 9 )  Confidence level: 7 (previous level was 7 )      Has the patient smoked ANY cigarettes (even 1 puff) in the last 7 days?: Yes  If yes, how much? 0.66PPD. What were you doing/do you know what the trigger was? In the morning, talking on the phone, playing on her tablet, after eating. Baseline PPD: 0.66  Current PPD: 0.66  Smoking Reduction Percent: 0%    Pharmacological Agent used: Chantix -    . Pt has not gotten it yet due to cost but cost to go down when get pre- authorization and then pt will  and get. Concerns: Weight gain as pt mentioned at last appt. PLAN    Discussed the following:     Curtis Fuentes over pages 2-5 in smoking cessation booklet  Benefits to quitting:  health, time, financial  Nicotine replacement and pharmacological options - went over chantix use and side effects as this is what the pt will be using. Cigarette pack wraps to identify triggers explained to pt  Tobacco cessation quit tips  CO monitoring     Notes/comments: Pt very receptive. Pt states she was down to 10-11 cigarettes yesterday. Encouraged pt to keep trying to taper daily cigarette usage. Pts goal for the next week are to fill out the pack wrap paper. Next appointment: Wednesday, January 18th at 130pm in office    Ayleen List verbalized understanding of the above information and denied further questions or concerns. The total time spent with the pt was 35 minutes. Charges dropped No. If no, due to done by phone.

## 2023-01-18 ENCOUNTER — HOSPITAL ENCOUNTER (OUTPATIENT)
Dept: PHARMACY | Age: 68
Setting detail: THERAPIES SERIES
Discharge: HOME | End: 2023-01-18
Payer: MEDICARE

## 2023-01-18 PROCEDURE — 99211 OFF/OP EST MAY X REQ PHY/QHP: CPT

## 2023-01-18 NOTE — PROGRESS NOTES
Medication Management   [facility]. [facility]  Smoking Cessation Clinic  [phone] (phone)  [phone] (fax)  Mae Andres          1955  MD Zoey Ford Ken Wood is a 69 y.o. female who presents today for visit 3/12. Previous Goal/Quit Date: none set                       New Goal/Quit Date: none set yet    Previous Goal/Quit Date Achieved:  N/A    Scaling:  Importance level:  9 (previous level was 9 )  Confidence level: 7 (previous level was 7 )    CO Level:  2.96%/18ppm      Has the patient smoked ANY cigarettes (even 1 puff) in the last 7 days?: Yes  If yes, how much? 0.66PPD What were you doing/do you know what the trigger was? Coffee, talking on the phone    Baseline PPD: 0.66PPD  Current PPD: 0.66PPD  Smoking Reduction Percent: 0%    Pharmacological Agent used: Chantix -   . Pt picked up chantix from pharmacy today and will start it tomorrow. PLAN    Discussed the following:     Went over pages 6-7 in smoking cessation booklet  Tobacco cessation quit tips  Cigarette pack wraps to identify triggers  Setting a quit date and making a quit plan  Preparing for quit day- clean car, house, etc.  Get emergency quit pack together. Discuss 4-D's to help with cravings  Trigger avoidance and coping with the urge to quit       Standard written patient education provided to pt:  Quit Plan    Notes/comments: Pt picked up chantix today from her pharmacy and is planning on starting tomorrow. Pt thinking of possible quit date of 1/26-1/28. I went over chantix use and side effects. Pt had fill out the pack wrap paper and we discussed her triggers and some plans for her triggers. Pts goal for the next week are to fill out the pack wrap paper and start the chantix    Next appointment: Wednesday, January 25th at 315pm by phone    Mae Andres verbalized understanding of the above information and denied further questions or concerns.     The total time spent with the pt was 40 minutes. Charges dropped Yes.

## 2023-01-25 ENCOUNTER — HOSPITAL ENCOUNTER (OUTPATIENT)
Dept: PHARMACY | Age: 68
Setting detail: THERAPIES SERIES
Discharge: HOME | End: 2023-01-25
Payer: MEDICARE

## 2023-01-25 NOTE — PROGRESS NOTES
Medication Management   [facility]  Smoking Cessation Clinic  [phone] (phone)  [phone] (fax)  Mary March          1955  MD Zoey Fernandez GUY Gold is a 69 y.o. female was called today for visit 4/12. Previous Goal/Quit Date: none set                       New Goal/Quit Date: none yet    Previous Goal/Quit Date Achieved:  n/a    Scaling:  Importance level:  \"it is not important\" per pt due to being so tired from not sleeping (previous level was 9 )  Confidence level: \"very low\" per pt (previous level was 7)      Has the patient smoked ANY cigarettes (even 1 puff) in the last 7 days?: Yes  If yes, how much? 0.66PPD. Baseline PPD: 0.66PPD  Current PPD: 0.66PPD  Smoking Reduction Percent: 0%    Pharmacological Agent used: Chantix -    and the Med/NRT was started on 1/19/2023. Compliant with regimen: Yes    Medication Adverse Effects:  trouble sleeping    Concerns:  pt feels she is having trouble sleeping due to Chantix      PLAN    Discussed the following:     Went over pages 8-15 in smoking cessation booklet  Physical and psychological dependence associated with smoking and potential withdrawal symptoms  Discuss how and when body changes/heals after quitting smoking  Difference between lapse and relapse  Provide list of support groups/websites    Notes/comments: Pt states having trouble sleeping and thinks it is due to Chantix. Pt very tired today due to not sleeping well. Encouraged pt to try and take 2nd pill of day by 1-2p and see if that helps. Or pt states she may just try taking 1 chantix a day in am.  Pt did state that the chantix may be slightly decreasing her cravings. Pt also states having trouble finding a friend to use as a support person due to some friends smoke, others are always busy and others live out of state so hard to get a hold of.    I mentioned possibly getting a couple friends for support so only have to call them maybe once a week because using a couple different friends for support. Or also suggested that she could call 6-800-QUIT-NOW for support as they are available 24/7. Pts goal for the next week are to continue with Chantix(see notes above) and to try and taper daily cigarette usage and set quit date. Next appointment: Wednesday, February 8th at 1230 by phone    Reynaldo Heard verbalized understanding of the above information and denied further questions or concerns. The total time spent with the pt was 30 minutes. Charges dropped No. If no, due to visit done over the phone.

## 2023-02-01 ENCOUNTER — TELEPHONE (OUTPATIENT)
Dept: PHARMACY | Age: 68
End: 2023-02-01

## 2023-02-01 NOTE — TELEPHONE ENCOUNTER
Patient calls to report that she had diarrhea last week and is attributing it to the Chantix. States she is fine on 1mg per day but when she takes 1mg BID, she cannot tolerate it. However, on 1mg per day she still has cravings. Discussed options with patient. She does not want to try nicotine patch/gum/lozenge. Advised pt to continue Chantix 1mg daily as she states she tolerates this dose, will reassess via telephone appt next week as already planned.

## 2023-03-09 ENCOUNTER — NURSE ONLY (OUTPATIENT)
Dept: LAB | Age: 68
End: 2023-03-09

## 2023-03-09 DIAGNOSIS — E55.9 VITAMIN D DEFICIENCY: ICD-10-CM

## 2023-03-09 LAB — 25(OH)D3 SERPL-MCNC: 32 NG/ML (ref 30–100)

## 2023-03-23 ENCOUNTER — TELEMEDICINE (OUTPATIENT)
Dept: FAMILY MEDICINE CLINIC | Age: 68
End: 2023-03-23
Payer: MEDICARE

## 2023-03-23 DIAGNOSIS — R09.81 SINUS CONGESTION: ICD-10-CM

## 2023-03-23 DIAGNOSIS — J44.9 CHRONIC OBSTRUCTIVE PULMONARY DISEASE, UNSPECIFIED COPD TYPE (HCC): ICD-10-CM

## 2023-03-23 DIAGNOSIS — E66.01 SEVERE OBESITY (BMI 35.0-39.9) WITH COMORBIDITY (HCC): ICD-10-CM

## 2023-03-23 DIAGNOSIS — R05.1 ACUTE COUGH: ICD-10-CM

## 2023-03-23 DIAGNOSIS — R06.2 WHEEZING: ICD-10-CM

## 2023-03-23 DIAGNOSIS — Z20.822 EXPOSURE TO COVID-19 VIRUS: Primary | ICD-10-CM

## 2023-03-23 DIAGNOSIS — R53.83 FATIGUE, UNSPECIFIED TYPE: ICD-10-CM

## 2023-03-23 PROCEDURE — 1123F ACP DISCUSS/DSCN MKR DOCD: CPT | Performed by: NURSE PRACTITIONER

## 2023-03-23 PROCEDURE — 99213 OFFICE O/P EST LOW 20 MIN: CPT | Performed by: NURSE PRACTITIONER

## 2023-03-23 RX ORDER — PREDNISONE 20 MG/1
20 TABLET ORAL 2 TIMES DAILY
Qty: 10 TABLET | Refills: 0 | Status: SHIPPED | OUTPATIENT
Start: 2023-03-23 | End: 2023-03-28

## 2023-03-23 SDOH — ECONOMIC STABILITY: TRANSPORTATION INSECURITY
IN THE PAST 12 MONTHS, HAS LACK OF TRANSPORTATION KEPT YOU FROM MEETINGS, WORK, OR FROM GETTING THINGS NEEDED FOR DAILY LIVING?: NO

## 2023-03-23 SDOH — ECONOMIC STABILITY: INCOME INSECURITY: HOW HARD IS IT FOR YOU TO PAY FOR THE VERY BASICS LIKE FOOD, HOUSING, MEDICAL CARE, AND HEATING?: NOT HARD AT ALL

## 2023-03-23 SDOH — ECONOMIC STABILITY: FOOD INSECURITY: WITHIN THE PAST 12 MONTHS, THE FOOD YOU BOUGHT JUST DIDN'T LAST AND YOU DIDN'T HAVE MONEY TO GET MORE.: NEVER TRUE

## 2023-03-23 SDOH — ECONOMIC STABILITY: FOOD INSECURITY: WITHIN THE PAST 12 MONTHS, YOU WORRIED THAT YOUR FOOD WOULD RUN OUT BEFORE YOU GOT MONEY TO BUY MORE.: NEVER TRUE

## 2023-03-23 SDOH — ECONOMIC STABILITY: HOUSING INSECURITY
IN THE LAST 12 MONTHS, WAS THERE A TIME WHEN YOU DID NOT HAVE A STEADY PLACE TO SLEEP OR SLEPT IN A SHELTER (INCLUDING NOW)?: NO

## 2023-03-23 ASSESSMENT — ENCOUNTER SYMPTOMS
SINUS PRESSURE: 1
DIARRHEA: 0
NAUSEA: 0
SHORTNESS OF BREATH: 1
BLOOD IN STOOL: 0
WHEEZING: 1
COUGH: 1
VOMITING: 0
CONSTIPATION: 0

## 2023-03-23 NOTE — PROGRESS NOTES
tablet 3    fexofenadine (ALLEGRA) 180 MG tablet Take 180 mg by mouth in the morning. albuterol sulfate HFA (PROVENTIL;VENTOLIN;PROAIR) 108 (90 Base) MCG/ACT inhaler INHALE 2 PUFFS INTO THE LUNGS EVERY 4 TO 6 HOURS AS NEEDED FOR WHEEZING 3 each 3    Multiple Vitamins-Minerals (OCUVITE PO) Take by mouth daily      Calcium Citrate-Vitamin D (CALCIUM + D PO) Take by mouth daily       No current facility-administered medications for this visit. Review of Systems   Constitutional:  Positive for activity change and fatigue. Negative for chills, diaphoresis and fever. HENT:  Positive for congestion, sinus pressure and sneezing. Respiratory:  Positive for cough, shortness of breath and wheezing. Cardiovascular:  Negative for chest pain, palpitations and leg swelling. Gastrointestinal:  Negative for blood in stool, constipation, diarrhea, nausea and vomiting. Genitourinary:  Negative for dysuria and hematuria. Musculoskeletal:  Negative for myalgias. Neurological:  Negative for dizziness and headaches. All other systems reviewed and are negative. OBJECTIVE     Due to this being a TeleHealth encounter, evaluation of the following organ systems is limited: Vitals/Constitutional/EENT/Resp/CV/GI//MS/Neuro/Skin/Heme-Lymph-Imm. PHYSICAL EXAMINATION:  [ INSTRUCTIONS:  \"[x]\" Indicates a positive item  \"[]\" Indicates a negative item  -- DELETE ALL ITEMS NOT EXAMINED]  Vital Signs: (All Vital Signs are pt reported, unless otherwise noted)   There were no vitals taken for this visit. Constitutional: [] Appears well-developed and well-nourished [x] No apparent distress      [x] Abnormal- appears ill  Mental status  [x] Alert and awake  [x] Oriented to person/place/time [x]Able to follow commands      Eyes:  EOM    [x]  Normal  [] Abnormal-  Sclera  [x]  Normal  [] Abnormal -         Discharge [x]  None visible  [] Abnormal -    HENT:   [x] Normocephalic, atraumatic.   [] Abnormal   [x]

## 2023-03-24 ENCOUNTER — TELEPHONE (OUTPATIENT)
Dept: FAMILY MEDICINE CLINIC | Age: 68
End: 2023-03-24

## 2023-03-27 ENCOUNTER — TELEPHONE (OUTPATIENT)
Dept: FAMILY MEDICINE CLINIC | Age: 68
End: 2023-03-27

## 2023-03-27 NOTE — TELEPHONE ENCOUNTER
Pt calls in stating that she has started with paxlovid and over the weekend her BPs have increased. She states that she was 150/90. She took an additional Lisinopril which brought it 134/75. She states she also experienced a pretty bad nose bleed. Once on saturday Today at 9:00am 175/80, 162/92 at 10:30am. 166/76 at 11:00am.  Stopped paxlovid on Saturday. Does complain of headache and some dizziness. Stated that she did have some Gerd like symptoms bit denies chest pain. SOB, and denied the ED. She is asking if she should increase her BP med or what does TS advise. [email or web address]. [phone].

## 2023-03-27 NOTE — TELEPHONE ENCOUNTER
Okay. Likely elevated related to being ill. Can increase lisinopril to 2 tablets for the next few days and keep me updated. Gerd symptoms could be related to paxlovid or covid. Can use pepcid as needed. To ED if sob gets worse.  TS

## 2023-05-15 RX ORDER — ALBUTEROL SULFATE 90 UG/1
AEROSOL, METERED RESPIRATORY (INHALATION)
Qty: 25.5 G | Refills: 1 | Status: SHIPPED | OUTPATIENT
Start: 2023-05-15

## 2023-05-15 NOTE — TELEPHONE ENCOUNTER
This medication refill is regarding a electronic request. Refill requested by  Brett Smith RD . Requested Prescriptions     Pending Prescriptions Disp Refills    albuterol sulfate HFA (PROVENTIL;VENTOLIN;PROAIR) 108 (90 Base) MCG/ACT inhaler [Pharmacy Med Name: ALBUTEROL HFA INH (200 PUFFS) 8.5GM] 25.5 g      Sig: INHALE 2 PUFFS INTO THE LUNGS EVERY 4 TO 6 HOURS AS NEEDED FOR WHEEZING       Date of last visit: 3/23/2023   Date of next visit: Visit date not found  Date of last refill: 7/11/22  Pharmacy Name: [facility]    Last Lipid Panel:    Lab Results   Component Value Date/Time    CHOL 175 11/29/2022 10:47 AM    TRIG 95 11/29/2022 10:47 AM    HDL 57 11/29/2022 10:47 AM    HDL 40 05/19/2011 12:00 AM    LDLCALC 99 11/29/2022 10:47 AM     Last CMP:   Lab Results   Component Value Date     11/29/2022    K 4.7 11/29/2022    CL 99 11/29/2022    CO2 30 11/29/2022    BUN 10 11/29/2022    CREATININE 0.5 11/29/2022    GLUCOSE 100 11/29/2022    CALCIUM 9.8 11/29/2022    PROT 7.1 11/29/2022    LABALBU 4.1 11/29/2022    BILITOT 0.4 11/29/2022    ALKPHOS 106 11/29/2022    AST 17 11/29/2022    ALT <5 (L) 11/29/2022    LABGLOM >60 11/29/2022       Last Thyroid:    Lab Results   Component Value Date    TSH 1.950 11/29/2022    T4FREE 1.26 06/18/2019     Last Hemoglobin A1C:  No results found for: LABA1C, AVGG    Rx verified, ordered and set to EP.

## 2023-05-24 ENCOUNTER — HOSPITAL ENCOUNTER (OUTPATIENT)
Age: 68
Discharge: HOME | End: 2023-05-24
Payer: MEDICARE

## 2023-05-24 ENCOUNTER — TELEPHONE (OUTPATIENT)
Dept: FAMILY MEDICINE CLINIC | Age: 68
End: 2023-05-24

## 2023-05-24 ENCOUNTER — HOSPITAL ENCOUNTER (OUTPATIENT)
Dept: GENERAL RADIOLOGY | Age: 68
Discharge: HOME | End: 2023-05-24
Payer: MEDICARE

## 2023-05-24 ENCOUNTER — OFFICE VISIT (OUTPATIENT)
Dept: FAMILY MEDICINE CLINIC | Age: 68
End: 2023-05-24
Payer: MEDICARE

## 2023-05-24 VITALS
OXYGEN SATURATION: 93 % | WEIGHT: 221.38 LBS | DIASTOLIC BLOOD PRESSURE: 78 MMHG | BODY MASS INDEX: 39.23 KG/M2 | HEART RATE: 53 BPM | SYSTOLIC BLOOD PRESSURE: 118 MMHG | HEIGHT: 63 IN

## 2023-05-24 DIAGNOSIS — R06.09 POST-COVID CHRONIC DYSPNEA: Primary | ICD-10-CM

## 2023-05-24 DIAGNOSIS — U09.9 POST-COVID CHRONIC DYSPNEA: ICD-10-CM

## 2023-05-24 DIAGNOSIS — U09.9 POST-COVID CHRONIC DYSPNEA: Primary | ICD-10-CM

## 2023-05-24 DIAGNOSIS — J44.9 CHRONIC OBSTRUCTIVE PULMONARY DISEASE, UNSPECIFIED COPD TYPE (HCC): ICD-10-CM

## 2023-05-24 DIAGNOSIS — U09.9 POST-COVID CHRONIC COUGH: ICD-10-CM

## 2023-05-24 DIAGNOSIS — R06.2 WHEEZING: ICD-10-CM

## 2023-05-24 DIAGNOSIS — R05.3 POST-COVID CHRONIC COUGH: ICD-10-CM

## 2023-05-24 DIAGNOSIS — R06.09 POST-COVID CHRONIC DYSPNEA: ICD-10-CM

## 2023-05-24 PROCEDURE — 71046 X-RAY EXAM CHEST 2 VIEWS: CPT

## 2023-05-24 PROCEDURE — 99214 OFFICE O/P EST MOD 30 MIN: CPT | Performed by: NURSE PRACTITIONER

## 2023-05-24 PROCEDURE — 3074F SYST BP LT 130 MM HG: CPT | Performed by: NURSE PRACTITIONER

## 2023-05-24 PROCEDURE — 1123F ACP DISCUSS/DSCN MKR DOCD: CPT | Performed by: NURSE PRACTITIONER

## 2023-05-24 PROCEDURE — 3078F DIAST BP <80 MM HG: CPT | Performed by: NURSE PRACTITIONER

## 2023-05-24 RX ORDER — FLUTICASONE PROPIONATE AND SALMETEROL 250; 50 UG/1; UG/1
1 POWDER RESPIRATORY (INHALATION) EVERY 12 HOURS
Qty: 60 EACH | Refills: 3 | Status: SHIPPED | OUTPATIENT
Start: 2023-05-24 | End: 2023-05-25

## 2023-05-24 RX ORDER — PREDNISONE 20 MG/1
20 TABLET ORAL 2 TIMES DAILY
Qty: 10 TABLET | Refills: 0 | Status: SHIPPED | OUTPATIENT
Start: 2023-05-24 | End: 2023-05-29

## 2023-05-24 RX ORDER — AMOXICILLIN 500 MG/1
CAPSULE ORAL
COMMUNITY
Start: 2023-03-01

## 2023-05-24 ASSESSMENT — PATIENT HEALTH QUESTIONNAIRE - PHQ9
SUM OF ALL RESPONSES TO PHQ QUESTIONS 1-9: 0
SUM OF ALL RESPONSES TO PHQ9 QUESTIONS 1 & 2: 0
2. FEELING DOWN, DEPRESSED OR HOPELESS: 0
1. LITTLE INTEREST OR PLEASURE IN DOING THINGS: 0
SUM OF ALL RESPONSES TO PHQ QUESTIONS 1-9: 0

## 2023-05-24 ASSESSMENT — ENCOUNTER SYMPTOMS
COUGH: 1
SHORTNESS OF BREATH: 1

## 2023-05-24 NOTE — PATIENT INSTRUCTIONS
Will do some test on the lungs - pt states she is going to hold off on the the PFT   Will add the daily inhaler - Advair  Can use Albuterol as needed for wheezing/shortness of breath

## 2023-05-24 NOTE — TELEPHONE ENCOUNTER
----- Message from GAETANO Tee CNP sent at 5/24/2023  3:59 PM EDT -----  Chest xray shows no acute findings in the lungs  - some arthritis and osteoporosis in the thoracis spine.

## 2023-05-24 NOTE — TELEPHONE ENCOUNTER
Pt notified of results and verbalized understanding. Pt states that [employer] was going to charge $47 for the Advair inhaler for a 1 month supply. Pt would prefer to have the QVAR inhaler sent into [facility] instead for a year supply. Please advise.

## 2023-05-24 NOTE — PROGRESS NOTES
[facility]  [address] [place] [id number]  Dept: [phone]  Loc: [phone]      Visit Date: 5/24/2023    Bonnie Hughes is a 69 y.o. female who presents today for:  Chief Complaint   Patient presents with    Shortness of Breath     On exertion since having covid     Cough     HPI:     Had COVID in March - treated with Paxlovid. Has on going cough and SOB since COVID. Has had a lot of runny nose, PND, voice hoarseness. No fevers. Using Sinex spray, allegra, Claritin (6 days) - feels like she has a lot of mucus. Started smoking at age 23 - a pack will last about 2 days - tried to do the smoking cessation program - did not quit.      Had a PFT done 2019 - showed severe airway obstruction - at home oxygen is 95-95%   HPI  Health Maintenance   Topic Date Due    Hepatitis C screen  Never done    Shingles vaccine (1 of 2) Never done    Low dose CT lung screening  Never done    DEXA (modify frequency per FRAX score)  Never done    DTaP/Tdap/Td vaccine (2 - Td or Tdap) 09/26/2021    COVID-19 Vaccine (4 - Booster for Moderna series) 12/22/2021    Flu vaccine (Season Ended) 08/01/2023    Depression Screen  12/07/2023    Annual Wellness Visit (AWV)  12/08/2023    Colorectal Cancer Screen  08/01/2024    Breast cancer screen  09/22/2024    Lipids  11/29/2027    Pneumococcal 65+ years Vaccine  Completed    Hepatitis A vaccine  Aged Out    Hib vaccine  Aged Out    Meningococcal (ACWY) vaccine  Aged Out     Past Medical History:   Diagnosis Date    Asthma     Chronic back pain     COPD (chronic obstructive pulmonary disease) ([address]     Diverticulosis     Essential hypertension 11/5/2019    Hx of blood clots 1970    leg    Obesity     Osteoarthritis     PONV (postoperative nausea and vomiting)     with knee replacement only and patient thinks that was from the nerve block    Tobacco abuse       Past Surgical History:   Procedure Laterality Date

## 2023-05-25 DIAGNOSIS — J44.9 CHRONIC OBSTRUCTIVE PULMONARY DISEASE, UNSPECIFIED COPD TYPE (HCC): Primary | ICD-10-CM

## 2023-05-25 DIAGNOSIS — R06.02 SOB (SHORTNESS OF BREATH): ICD-10-CM

## 2023-06-05 ENCOUNTER — HOSPITAL ENCOUNTER (EMERGENCY)
Age: 68
Discharge: HOME | End: 2023-06-05
Payer: MEDICARE

## 2023-06-05 VITALS
BODY MASS INDEX: 39.56 KG/M2 | DIASTOLIC BLOOD PRESSURE: 84 MMHG | TEMPERATURE: 97.7 F | HEIGHT: 62 IN | HEART RATE: 55 BPM | SYSTOLIC BLOOD PRESSURE: 145 MMHG | RESPIRATION RATE: 18 BRPM | WEIGHT: 215 LBS | OXYGEN SATURATION: 96 %

## 2023-06-05 DIAGNOSIS — J01.00 ACUTE MAXILLARY SINUSITIS, RECURRENCE NOT SPECIFIED: Primary | ICD-10-CM

## 2023-06-05 PROCEDURE — 99213 OFFICE O/P EST LOW 20 MIN: CPT | Performed by: NURSE PRACTITIONER

## 2023-06-05 RX ORDER — PREDNISONE 20 MG/1
40 TABLET ORAL DAILY
Qty: 14 TABLET | Refills: 0 | Status: SHIPPED | OUTPATIENT
Start: 2023-06-05 | End: 2023-06-12

## 2023-06-05 RX ORDER — AMOXICILLIN AND CLAVULANATE POTASSIUM 875; 125 MG/1; MG/1
1 TABLET, FILM COATED ORAL 2 TIMES DAILY
Qty: 14 TABLET | Refills: 0 | Status: SHIPPED | OUTPATIENT
Start: 2023-06-05 | End: 2023-06-12

## 2023-06-05 ASSESSMENT — ENCOUNTER SYMPTOMS
SINUS PRESSURE: 1
NAUSEA: 0
SHORTNESS OF BREATH: 0
COUGH: 1
SORE THROAT: 1
DIARRHEA: 0
VOMITING: 0

## 2023-06-05 ASSESSMENT — PAIN SCALES - GENERAL: PAINLEVEL_OUTOF10: 3

## 2023-06-05 ASSESSMENT — PAIN DESCRIPTION - DESCRIPTORS: DESCRIPTORS: DULL;ACHING

## 2023-06-05 ASSESSMENT — PAIN - FUNCTIONAL ASSESSMENT: PAIN_FUNCTIONAL_ASSESSMENT: 0-10

## 2023-06-05 ASSESSMENT — PAIN DESCRIPTION - LOCATION: LOCATION: HEAD

## 2023-06-05 NOTE — ED PROVIDER NOTES
[facility]  Urgent Care Encounter       CHIEF COMPLAINT       Chief Complaint   Patient presents with    Pharyngitis    Cough     Chest congestion    Headache       Nurses Notes reviewed and I agree except as noted in the HPI. HISTORY OF PRESENT ILLNESS   Zoey Maher is a 69 y.o. female who presents for evaluation of sinus pressure, congestion, cough and mild chest tightness. Patient states that the symptoms have been ongoing for nearly 10 days. She did have COVID 2 months ago does not feel as though she ever completely got over it. She denies any significant shortness of breath or any recorded fevers. She has been on a round of prednisone that prescribed her PCP which she finished last week. The history is provided by the patient. REVIEW OF SYSTEMS     Review of Systems   Constitutional:  Negative for chills and fever. HENT:  Positive for congestion, sinus pressure and sore throat. Respiratory:  Positive for cough. Negative for shortness of breath. Cardiovascular:  Negative for chest pain. Gastrointestinal:  Negative for diarrhea, nausea and vomiting. Musculoskeletal:  Negative for arthralgias and myalgias. Skin:  Negative for rash. Neurological:  Negative for headaches. PAST MEDICAL HISTORY         Diagnosis Date    Asthma     Chronic back pain     COPD (chronic obstructive pulmonary disease) ([facility] [address]     Diverticulosis     Essential hypertension 11/5/2019    Hx of blood clots 1970    leg    Obesity     Osteoarthritis     PONV (postoperative nausea and vomiting)     with knee replacement only and patient thinks that was from the nerve block    Tobacco abuse        SURGICALHISTORY     Patient  has a past surgical history that includes hernia repair (2008); Knee arthroscopy (1998); Appendectomy (1990's); Cholecystectomy (1990's); Tonsillectomy and adenoidectomy (1980's); Colonoscopy (2008);  Knee arthroscopy (Right, 9/30/15,12/9/15); knee surgery (Right,

## 2023-06-05 NOTE — ED NOTES
Patient understood instructions verbally,  Follow up with PCP with any concerns, e-script, Worse cough, SOB, chest pain symptoms follow up with ED.ambulated self to lobby,stable condition. All belongings with patient.       Arnaldo Jara LPN  78/50/14 7408

## 2023-06-05 NOTE — ED NOTES
Pt with complaints of a sore throat, productive cough, and headache. States she has a productive cough with thick yellow mucus. States pain only in head at this time.      Marylen Bathe, JUNIE  89/80/24 6611

## 2023-07-14 DIAGNOSIS — M17.0 PRIMARY OSTEOARTHRITIS OF BOTH KNEES: ICD-10-CM

## 2023-07-14 RX ORDER — NAPROXEN 250 MG/1
TABLET ORAL
Qty: 60 TABLET | Refills: 1 | OUTPATIENT
Start: 2023-07-14

## 2023-07-14 NOTE — TELEPHONE ENCOUNTER
Request sent from [address] [place] [address] for refill of naproxen 250 mg bid prn. Last seen 5/24/23, scheduled with GAUTAM ANDRADE [facility] as new pt next week. Will refuse rx at this time as she will not be following with this office.

## 2023-07-15 SDOH — HEALTH STABILITY: PHYSICAL HEALTH: ON AVERAGE, HOW MANY DAYS PER WEEK DO YOU ENGAGE IN MODERATE TO STRENUOUS EXERCISE (LIKE A BRISK WALK)?: 0 DAYS

## 2023-07-18 ENCOUNTER — OFFICE VISIT (OUTPATIENT)
Dept: FAMILY MEDICINE CLINIC | Age: 68
End: 2023-07-18
Payer: MEDICARE

## 2023-07-18 VITALS
BODY MASS INDEX: 41.26 KG/M2 | SYSTOLIC BLOOD PRESSURE: 132 MMHG | RESPIRATION RATE: 16 BRPM | OXYGEN SATURATION: 93 % | DIASTOLIC BLOOD PRESSURE: 78 MMHG | HEIGHT: 62 IN | TEMPERATURE: 98 F | HEART RATE: 69 BPM | WEIGHT: 224.2 LBS

## 2023-07-18 DIAGNOSIS — R09.81 NASAL CONGESTION: ICD-10-CM

## 2023-07-18 DIAGNOSIS — M54.16 LUMBAR BACK PAIN WITH RADICULOPATHY AFFECTING RIGHT LOWER EXTREMITY: ICD-10-CM

## 2023-07-18 DIAGNOSIS — Z72.0 TOBACCO ABUSE: ICD-10-CM

## 2023-07-18 DIAGNOSIS — Z00.00 ENCOUNTER FOR MEDICAL EXAMINATION TO ESTABLISH CARE: Primary | ICD-10-CM

## 2023-07-18 DIAGNOSIS — J44.9 CHRONIC OBSTRUCTIVE PULMONARY DISEASE, UNSPECIFIED COPD TYPE (HCC): ICD-10-CM

## 2023-07-18 DIAGNOSIS — M54.31 SCIATICA OF RIGHT SIDE: ICD-10-CM

## 2023-07-18 DIAGNOSIS — M17.0 PRIMARY OSTEOARTHRITIS OF BOTH KNEES: ICD-10-CM

## 2023-07-18 DIAGNOSIS — I10 ESSENTIAL HYPERTENSION: ICD-10-CM

## 2023-07-18 PROCEDURE — 99204 OFFICE O/P NEW MOD 45 MIN: CPT | Performed by: NURSE PRACTITIONER

## 2023-07-18 PROCEDURE — 1123F ACP DISCUSS/DSCN MKR DOCD: CPT | Performed by: NURSE PRACTITIONER

## 2023-07-18 PROCEDURE — 3078F DIAST BP <80 MM HG: CPT | Performed by: NURSE PRACTITIONER

## 2023-07-18 PROCEDURE — 3075F SYST BP GE 130 - 139MM HG: CPT | Performed by: NURSE PRACTITIONER

## 2023-07-18 RX ORDER — MOMETASONE FUROATE 50 UG/1
SPRAY, METERED NASAL
Qty: 51 G | OUTPATIENT
Start: 2023-07-18

## 2023-07-18 RX ORDER — NAPROXEN 250 MG/1
250 TABLET ORAL 2 TIMES DAILY PRN
Qty: 60 TABLET | Refills: 2 | Status: SHIPPED | OUTPATIENT
Start: 2023-07-18

## 2023-07-18 RX ORDER — MOMETASONE FUROATE 50 UG/1
2 SPRAY, METERED NASAL DAILY
Qty: 1 EACH | Refills: 3 | Status: SHIPPED | OUTPATIENT
Start: 2023-07-18

## 2023-07-18 RX ORDER — ALBUTEROL SULFATE 90 UG/1
AEROSOL, METERED RESPIRATORY (INHALATION)
Qty: 25.5 G | Refills: 1 | Status: SHIPPED | OUTPATIENT
Start: 2023-07-18

## 2023-07-18 ASSESSMENT — ENCOUNTER SYMPTOMS
CHOKING: 0
COUGH: 1
GASTROINTESTINAL NEGATIVE: 1
CHEST TIGHTNESS: 0
BACK PAIN: 1
WHEEZING: 0
SHORTNESS OF BREATH: 1

## 2023-07-18 ASSESSMENT — PATIENT HEALTH QUESTIONNAIRE - PHQ9
SUM OF ALL RESPONSES TO PHQ QUESTIONS 1-9: 1
2. FEELING DOWN, DEPRESSED OR HOPELESS: 1
SUM OF ALL RESPONSES TO PHQ QUESTIONS 1-9: 1
SUM OF ALL RESPONSES TO PHQ QUESTIONS 1-9: 1
SUM OF ALL RESPONSES TO PHQ9 QUESTIONS 1 & 2: 1
1. LITTLE INTEREST OR PLEASURE IN DOING THINGS: 0
SUM OF ALL RESPONSES TO PHQ QUESTIONS 1-9: 1

## 2023-07-18 NOTE — PROGRESS NOTES
[address] MEDICINE  [address] [place] [address] DR. LIMA [facility] [address]  Dept: [phone]  Dept Fax: [phone]  Loc: [phone]    Susana Vegas is a 77 y.o. Milagro Lara presents today for her medical conditions/complaints as noted below. Zoey Chamberlain c/o of New Patient (To get established, no concerns )      :     Pt here to establish care. Pt has a history of Severe COPD, PFT's form 2019 reviewed, pt uses qvar sparingly prn. Will use albuterol when feels SOB, with recent heat index has been having some SOB lately. Does smoke 10 cigarettes per day does not want to quit. On singulair     States she has a history of osteoarthritis and has had several joint replacements. Continues to have joint pain on a chronic basis, states she has been tested for autoimmune conditions and labs have been negative. Takes naprosyn 275 mg bid for this. Back Pain    HPI:  Pain is present in the lumbar region. Symptoms have been present for 3 month(s). The pain is intermittent, moderate. The patient describes the pain as aching and throbbing. Inciting injury or history of trauma? No  Pain is aggravated by - standing, lying down  Pain is relieved by - sitting  Radiation of the pain? Yes - it radiates down her RLE and wraps around to right anterior thigh  Paresthesias of the extremities? No  Saddle anesthesia? No  Bowel or bladder incontinence? No  Treatments tried - chiropractor treatments, ice, heat, and NSAIDs Pt declines PT or other imaging today. Has mild macular degeneration, follows with Ophthalmology for this.      Chronic nasal congestion, tried flonase and gave her nose bleeds will try nasonex with [facility]     HTN controlled with lisinopril 10 mg daily     Lab Results       Component                Value               Date                       WBC                      8.4                 11/29/2022                 HGB                      15.1

## 2023-07-24 ENCOUNTER — HOSPITAL ENCOUNTER (OUTPATIENT)
Dept: GENERAL RADIOLOGY | Age: 68
Discharge: HOME | End: 2023-07-24
Payer: MEDICARE

## 2023-07-24 ENCOUNTER — HOSPITAL ENCOUNTER (OUTPATIENT)
Age: 68
Discharge: HOME | End: 2023-07-24
Payer: MEDICARE

## 2023-07-24 DIAGNOSIS — M54.16 LUMBAR BACK PAIN WITH RADICULOPATHY AFFECTING RIGHT LOWER EXTREMITY: ICD-10-CM

## 2023-07-24 PROCEDURE — 72100 X-RAY EXAM L-S SPINE 2/3 VWS: CPT

## 2023-07-26 ENCOUNTER — TELEPHONE (OUTPATIENT)
Dept: FAMILY MEDICINE CLINIC | Age: 68
End: 2023-07-26

## 2023-07-26 NOTE — TELEPHONE ENCOUNTER
Patient has been informed and voiced understanding and states that she will think about it and call provider back

## 2023-07-26 NOTE — TELEPHONE ENCOUNTER
----- Message from Leena Richards, APRN - CNP sent at 7/25/2023  4:56 PM EDT -----  Let pt know her lumbar x-rays identified osteophytes at L1-3 and L5-S1, and disc space narrowing which is likely causing her the lumbar pain  If current meds are not helping I can recommend PT for strengthening and/or PM referral.

## 2023-08-09 ENCOUNTER — TELEPHONE (OUTPATIENT)
Dept: FAMILY MEDICINE CLINIC | Age: 68
End: 2023-08-09

## 2023-08-09 DIAGNOSIS — J44.9 CHRONIC OBSTRUCTIVE PULMONARY DISEASE, UNSPECIFIED COPD TYPE (HCC): Primary | ICD-10-CM

## 2023-08-09 DIAGNOSIS — R05.3 CHRONIC COUGH: ICD-10-CM

## 2023-08-09 NOTE — TELEPHONE ENCOUNTER
Pt informed and verbalized understanding.    Pt will call SR to schedule, she just got out the bathtub

## 2023-08-09 NOTE — TELEPHONE ENCOUNTER
Let pt know I do want her to have the PFT's to see if her lung capacity has improved, I did place a new order for them thanks

## 2023-08-09 NOTE — TELEPHONE ENCOUNTER
Pt is asking if the PFT is still needing to be completed that was order by Cary De La Torre. If so a new order will need to be placed for the results to be sent to our office.       Please advise

## 2023-09-01 ENCOUNTER — HOSPITAL ENCOUNTER (OUTPATIENT)
Dept: PULMONOLOGY | Age: 68
Discharge: HOME | End: 2023-09-01
Payer: MEDICARE

## 2023-09-01 DIAGNOSIS — R05.3 CHRONIC COUGH: ICD-10-CM

## 2023-09-01 DIAGNOSIS — J44.9 CHRONIC OBSTRUCTIVE PULMONARY DISEASE, UNSPECIFIED COPD TYPE (HCC): ICD-10-CM

## 2023-09-01 PROCEDURE — 94060 EVALUATION OF WHEEZING: CPT

## 2023-09-01 PROCEDURE — 94729 DIFFUSING CAPACITY: CPT

## 2023-09-01 PROCEDURE — 94726 PLETHYSMOGRAPHY LUNG VOLUMES: CPT

## 2023-09-05 DIAGNOSIS — J44.9 STAGE 3 SEVERE COPD BY GOLD CLASSIFICATION (HCC): Primary | ICD-10-CM

## 2023-09-05 RX ORDER — FLUTICASONE FUROATE, UMECLIDINIUM BROMIDE AND VILANTEROL TRIFENATATE 100; 62.5; 25 UG/1; UG/1; UG/1
1 POWDER RESPIRATORY (INHALATION) DAILY
Qty: 1 EACH | Refills: 5 | Status: SHIPPED | OUTPATIENT
Start: 2023-09-05 | End: 2023-10-18 | Stop reason: SINTOL

## 2023-09-06 ENCOUNTER — TELEPHONE (OUTPATIENT)
Dept: FAMILY MEDICINE CLINIC | Age: 68
End: 2023-09-06

## 2023-09-06 DIAGNOSIS — J44.9 CHRONIC OBSTRUCTIVE PULMONARY DISEASE, UNSPECIFIED COPD TYPE (HCC): Primary | ICD-10-CM

## 2023-09-06 RX ORDER — MONTELUKAST SODIUM 10 MG/1
TABLET ORAL
Qty: 90 TABLET | Refills: 3 | Status: SHIPPED | OUTPATIENT
Start: 2023-09-06

## 2023-09-06 NOTE — TELEPHONE ENCOUNTER
Pt was informed of the PFT results showing severe COPD. Pt was also informed Trelegy was sent to her pharmacy to replace her Qvar. Pt was hesitant on switching to the Trelegy stating after the PFT on Friday, she was able to get up and clean her house with out becoming SOB on Saturday. Pt is also asking for a spirometer stating this will help with her COPD because it is similar to the PFT.        Please advise

## 2023-09-06 NOTE — TELEPHONE ENCOUNTER
Let pt know I did order an incentive spirometer that she can use BID to breathe into to help keep her lungs inflated, it may need faxed to a Home medical supply store so her medicare covers the cost.  The spirometry does help to keep her lung sac open but does not have any of the medications in it that the trelegy or qvar  inhalers have that decrease the inflammation of the lung sacs that help long term in treating the COPD. She does have an appt in October so we can also discuss how she is doing at that time with her breathing. Let me know if she has any other questions!

## 2023-09-06 NOTE — TELEPHONE ENCOUNTER
Patient informed and verbalized understanding. Patient is not sure what home medical company she wants to use, per her request order placed up front. She will  today

## 2023-09-06 NOTE — TELEPHONE ENCOUNTER
Recent Visits  Date Type Provider Dept   07/18/23 Office Visit Sabino Mccann APRN - CNP [facility]   05/24/23 Office Visit Andres Harp APRN - CNP [facility]   12/07/22 Office Visit Cherry Nickerson APRN - CNP [facility]   10/26/22 Office Visit GAETANO Davis - CNP [facility]   07/26/22 Office Visit Andres Harp APRN - CNP [facility]   05/04/22 Office Visit GAETANO Davis - CNP [facility]   Showing recent visits within past 540 days with a meds authorizing provider and meeting all other requirements  Future Appointments  Date Type Provider Dept   10/18/23 Appointment Sabino Mccann, APRN - CNP [facility]   Showing future appointments within next 150 days with a meds authorizing provider and meeting all other requirements

## 2023-09-06 NOTE — TELEPHONE ENCOUNTER
----- Message from GAETANO Rajan CNP sent at 9/5/2023  5:52 PM EDT -----  Let pt know her PFT results identify severe COPD, which is chronic obstructive disorder of her lungs which is causing her SOB and coughing. It is treated with an inhaler different than the current one she is using. It is called trelegy , it is 3 medications in one and helps open her lung sacs on a daily basis to decrease the inflammation of the bronchioles in her lungs  which is causing her symptoms. I will send the trelegy to her pharmacy and then she will stop the qvar, let me know if any questions.

## 2023-09-11 ENCOUNTER — TELEPHONE (OUTPATIENT)
Dept: FAMILY MEDICINE CLINIC | Age: 68
End: 2023-09-11

## 2023-09-11 NOTE — TELEPHONE ENCOUNTER
Pt called stating after starting the trelegy on Thursday she had experienced dizziness which enabled her to do her daily task. Pt called her pharmacist at [facility] on Saturday and they had advised her to stop the Trelegy, in which the [address] [place] [address]  did stop. Pt states she has not been SOB with activity and her O2 has been steady at 96. Pt also stated she is not needing to use her emergency inhaler except at night before she goes to bed. Pt is picking up her spirometer tomorrow and will start using this as prescribed. Pt is not wanting to start any new medications at this time due to having issues with SOB. Pt states she will call the office when she feels the need for a daily inhaler.

## 2023-10-18 ENCOUNTER — OFFICE VISIT (OUTPATIENT)
Dept: FAMILY MEDICINE CLINIC | Age: 68
End: 2023-10-18
Payer: COMMERCIAL

## 2023-10-18 VITALS
RESPIRATION RATE: 20 BRPM | DIASTOLIC BLOOD PRESSURE: 84 MMHG | HEIGHT: 62 IN | TEMPERATURE: 97.9 F | OXYGEN SATURATION: 92 % | HEART RATE: 60 BPM | SYSTOLIC BLOOD PRESSURE: 136 MMHG | BODY MASS INDEX: 40.89 KG/M2 | WEIGHT: 222.2 LBS

## 2023-10-18 DIAGNOSIS — E55.9 VITAMIN D DEFICIENCY: ICD-10-CM

## 2023-10-18 DIAGNOSIS — J01.00 ACUTE NON-RECURRENT MAXILLARY SINUSITIS: ICD-10-CM

## 2023-10-18 DIAGNOSIS — J44.9 CHRONIC OBSTRUCTIVE PULMONARY DISEASE, UNSPECIFIED COPD TYPE (HCC): Primary | ICD-10-CM

## 2023-10-18 DIAGNOSIS — R06.02 SOB (SHORTNESS OF BREATH): ICD-10-CM

## 2023-10-18 DIAGNOSIS — H61.23 BILATERAL IMPACTED CERUMEN: ICD-10-CM

## 2023-10-18 DIAGNOSIS — Z72.0 TOBACCO ABUSE: ICD-10-CM

## 2023-10-18 DIAGNOSIS — I10 ESSENTIAL HYPERTENSION: ICD-10-CM

## 2023-10-18 DIAGNOSIS — T88.7XXA MEDICATION SIDE EFFECT: ICD-10-CM

## 2023-10-18 PROCEDURE — 69209 REMOVE IMPACTED EAR WAX UNI: CPT | Performed by: NURSE PRACTITIONER

## 2023-10-18 PROCEDURE — 99214 OFFICE O/P EST MOD 30 MIN: CPT | Performed by: NURSE PRACTITIONER

## 2023-10-18 PROCEDURE — 3079F DIAST BP 80-89 MM HG: CPT | Performed by: NURSE PRACTITIONER

## 2023-10-18 PROCEDURE — 3075F SYST BP GE 130 - 139MM HG: CPT | Performed by: NURSE PRACTITIONER

## 2023-10-18 PROCEDURE — 1123F ACP DISCUSS/DSCN MKR DOCD: CPT | Performed by: NURSE PRACTITIONER

## 2023-10-18 RX ORDER — CHOLECALCIFEROL (VITAMIN D3) 125 MCG
1 CAPSULE ORAL DAILY
Qty: 90 TABLET | Refills: 4 | OUTPATIENT
Start: 2023-10-18

## 2023-10-18 RX ORDER — BECLOMETHASONE DIPROPIONATE HFA 40 UG/1
AEROSOL, METERED RESPIRATORY (INHALATION)
Qty: 10.6 G | Refills: 3 | Status: SHIPPED | OUTPATIENT
Start: 2023-10-18

## 2023-10-18 RX ORDER — LISINOPRIL 10 MG/1
10 TABLET ORAL DAILY
Qty: 90 TABLET | Refills: 3 | Status: SHIPPED | OUTPATIENT
Start: 2023-10-18

## 2023-10-18 RX ORDER — AZITHROMYCIN 250 MG/1
250 TABLET, FILM COATED ORAL SEE ADMIN INSTRUCTIONS
Qty: 6 TABLET | Refills: 0 | Status: SHIPPED | OUTPATIENT
Start: 2023-10-18 | End: 2023-10-23

## 2023-10-18 ASSESSMENT — ENCOUNTER SYMPTOMS
GASTROINTESTINAL NEGATIVE: 1
SINUS PRESSURE: 1
SINUS PAIN: 1
WHEEZING: 1
COUGH: 1
TROUBLE SWALLOWING: 0
SORE THROAT: 0

## 2023-10-18 NOTE — TELEPHONE ENCOUNTER
Recent Visits  Date Type Provider Dept   07/18/23 Office Visit Arvin Haynes APRN - CNP [facility]   05/24/23 Office Visit Ceasar Robins APRN - CNP [facility]   12/07/22 Office Visit Merritt López APRN - CNP [facility]   10/26/22 Office Visit Smith Flores APRN - CNP [facility]   07/26/22 Office Visit Ceasar Robins APRN - CNP [facility]   05/04/22 Office Visit Smith Flores APRN - CNP [facility]   Showing recent visits within past 540 days with a meds authorizing provider and meeting all other requirements  Today's Visits  Date Type Provider Dept   10/18/23 Office Visit Arvin Haynes APRN - CNP [facility]   Showing today's visits with a meds authorizing provider and meeting all other requirements  Future Appointments  No visits were found meeting these conditions.   Showing future appointments within next 150 days with a meds authorizing provider and meeting all other requirements

## 2023-10-18 NOTE — PROGRESS NOTES
[address] MEDICINE  [address] [place] [address] DR. [place] [address]  Dept: [phone]  Dept Fax: [phone]  Loc: [phone]    Roxy Gosselin is a 77 y.o. Kimberlee Berrios presents today for her medical conditions/complaints as noted below. Zoey Chamberlain c/o of Follow-up (3 month no concerns )      :    Pt here for f/u    COPD       COPD  -uncontrolled  -Pt had recent PFT's that identified sever COPD had been taking qvar prn, trelegy given and states it made her dizzy so she stopped it  -discussed alternative inhalers to treat the COPD but she declines use s her albuterol bid and it works.  -has chronic loose congested cough, worse today, no fever  _ id did educate her on COPD and causes and treatment and she will use albuterol  Recommend low dose lung ct she declines  Also declined her mammogram, too busy right now remodeling her home. HTN  -controlled with lisinopril 10 mg larry  -discussed this may be causing her throat tickle and dry cough, declines any med changes  -states she had her cough before this    -Smoking  -declines smoking cessation  Has tired cessation clinic and patches and they did not work. History of vitamin d deficiency levels in March 2023 32 , not taking any extra D3 will order 2000 units a day. Due to update labs, orders given    Due for MAW in December wants to wait until May 2024 due to insurance.      Sinus pain and pressure for the last week, uses nasonex prn   HPI    Current Outpatient Medications   Medication Sig Dispense Refill    lisinopril (PRINIVIL;ZESTRIL) 10 MG tablet Take 1 tablet by mouth daily 90 tablet 3    Cholecalciferol (VITAMIN D3) 50 MCG (2000 UT) TABS Take 1 tablet by mouth daily 90 tablet 4    azithromycin (ZITHROMAX) 250 MG tablet Take 1 tablet by mouth See Admin Instructions for 5 days 500mg on day 1 followed by 250mg on days 2 - 5 6 tablet 0    montelukast (SINGULAIR) 10 MG tablet TAKE 1 TABLET BY MOUTH

## 2023-10-18 NOTE — PROGRESS NOTES
Subjective:  Vj Downs is a 77 y.o. female being seen for No chief complaint on file. HPI:    Current medication regimen - Albuterol prn, Trelegy daily, Singulair daily  Compliant with medications? {YES/NO:79823}    Limitations in function - ***  Does patient smoke? {RESPONSES; YES/NO HDRPRCA:71683}    Chronic cough? : {yes no:429820}  Chest pain/Tightness?:  {yes no:579454}  Shortness of breath?: {yes no:087500}  Wheezing? {yes TR:077763}    Last PFTs - 9/1/23 identified severe COPD    Known triggers? {RESPONSES; YES/NO FCYEYMN:91022}  Hospitalized and/or intubated in the past?: {RESPONSES; YES/NO CAPITAL:05727}  Number of times prescribed oral steroids in the past year - {NUMBERS; 0-10:67282}  Influenza vaccine up to date? {RESPONSES; YES/NO CAPITAL:00636}  Pneumococcal vaccine up to date? {RESPONSES; YES/NO TKEDMSY:85667}      Objective: There were no vitals taken for this visit. Physical Examination: General appearance - alert, well appearing, and in no distress  Nose - normal and patent, no erythema, discharge or polyps  Mouth - mucous membranes moist, pharynx normal without lesions  Neck - supple, no significant adenopathy  Heart - normal rate, regular rhythm, normal S1, S2, no murmurs, rubs, clicks or gallops  Chest - {:235652}  Abdomen - soft, nontender, nondistended, no masses or organomegaly  Extremities - no pedal edema, no clubbing or cyanosis  Skin - Warm and dry  Psych - Normal affect without evidence of depression or anxiety    ASSESSMENT & PLAN  There are no diagnoses linked to this encounter. No follow-ups on file.

## 2023-10-20 ENCOUNTER — TELEPHONE (OUTPATIENT)
Dept: FAMILY MEDICINE CLINIC | Age: 68
End: 2023-10-20

## 2023-10-20 RX ORDER — PREDNISONE 20 MG/1
TABLET ORAL
Qty: 18 TABLET | Refills: 0 | Status: SHIPPED | OUTPATIENT
Start: 2023-10-20

## 2023-10-20 NOTE — TELEPHONE ENCOUNTER
Let pt know I sent in a script for the prednisone, have her contact us Monday if not feeling any better.  Hydrate well over tthe weekend  she can also consider flonase nasal spray for nasal congestion

## 2023-10-20 NOTE — TELEPHONE ENCOUNTER
States she had an appt on 10.18.23 and was told if she didn't feel any better to call  office to request prednisone    Uses [facility] [address]

## 2023-11-21 ENCOUNTER — NURSE ONLY (OUTPATIENT)
Dept: LAB | Age: 68
End: 2023-11-21

## 2023-11-21 ENCOUNTER — HOSPITAL ENCOUNTER (OUTPATIENT)
Dept: WOMENS IMAGING | Age: 68
Discharge: HOME | End: 2023-11-21
Payer: COMMERCIAL

## 2023-11-21 VITALS — HEIGHT: 62 IN | BODY MASS INDEX: 39.2 KG/M2 | WEIGHT: 213 LBS

## 2023-11-21 DIAGNOSIS — Z12.31 VISIT FOR SCREENING MAMMOGRAM: ICD-10-CM

## 2023-11-21 DIAGNOSIS — J01.00 ACUTE NON-RECURRENT MAXILLARY SINUSITIS: ICD-10-CM

## 2023-11-21 DIAGNOSIS — J44.9 CHRONIC OBSTRUCTIVE PULMONARY DISEASE, UNSPECIFIED COPD TYPE (HCC): ICD-10-CM

## 2023-11-21 DIAGNOSIS — I10 ESSENTIAL HYPERTENSION: ICD-10-CM

## 2023-11-21 LAB
ALBUMIN SERPL BCG-MCNC: 3.9 G/DL (ref 3.5–5.1)
ALP SERPL-CCNC: 99 U/L (ref 38–126)
ALT SERPL W/O P-5'-P-CCNC: < 5 U/L (ref 11–66)
ANION GAP SERPL CALC-SCNC: 8 MEQ/L (ref 8–16)
AST SERPL-CCNC: 16 U/L (ref 5–40)
BASOPHILS ABSOLUTE: 0.1 THOU/MM3 (ref 0–0.1)
BASOPHILS NFR BLD AUTO: 0.7 %
BILIRUB CONJ SERPL-MCNC: < 0.2 MG/DL (ref 0–0.3)
BILIRUB SERPL-MCNC: 0.3 MG/DL (ref 0.3–1.2)
BUN SERPL-MCNC: 9 MG/DL (ref 7–22)
CALCIUM SERPL-MCNC: 9.4 MG/DL (ref 8.5–10.5)
CHLORIDE SERPL-SCNC: 100 MEQ/L (ref 98–111)
CHOLEST SERPL-MCNC: 158 MG/DL (ref 100–199)
CO2 SERPL-SCNC: 35 MEQ/L (ref 23–33)
CREAT SERPL-MCNC: 0.6 MG/DL (ref 0.4–1.2)
DEPRECATED RDW RBC AUTO: 48.6 FL (ref 35–45)
EOSINOPHIL NFR BLD AUTO: 4.1 %
EOSINOPHILS ABSOLUTE: 0.3 THOU/MM3 (ref 0–0.4)
ERYTHROCYTE [DISTWIDTH] IN BLOOD BY AUTOMATED COUNT: 13.6 % (ref 11.5–14.5)
GFR SERPL CREATININE-BSD FRML MDRD: > 60 ML/MIN/1.73M2
GLUCOSE SERPL-MCNC: 104 MG/DL (ref 70–108)
HCT VFR BLD AUTO: 47.8 % (ref 37–47)
HDLC SERPL-MCNC: 56 MG/DL
HGB BLD-MCNC: 14.5 GM/DL (ref 12–16)
IMM GRANULOCYTES # BLD AUTO: 0.03 THOU/MM3 (ref 0–0.07)
IMM GRANULOCYTES NFR BLD AUTO: 0.4 %
LDLC SERPL CALC-MCNC: 83 MG/DL
LYMPHOCYTES ABSOLUTE: 1.5 THOU/MM3 (ref 1–4.8)
LYMPHOCYTES NFR BLD AUTO: 20.7 %
MCH RBC QN AUTO: 29.2 PG (ref 26–33)
MCHC RBC AUTO-ENTMCNC: 30.3 GM/DL (ref 32.2–35.5)
MCV RBC AUTO: 96.2 FL (ref 81–99)
MONOCYTES ABSOLUTE: 0.6 THOU/MM3 (ref 0.4–1.3)
MONOCYTES NFR BLD AUTO: 8.6 %
NEUTROPHILS NFR BLD AUTO: 65.5 %
NRBC BLD AUTO-RTO: 0 /100 WBC
PLATELET # BLD AUTO: 298 THOU/MM3 (ref 130–400)
PMV BLD AUTO: 10.4 FL (ref 9.4–12.4)
POTASSIUM SERPL-SCNC: 4.2 MEQ/L (ref 3.5–5.2)
PROT SERPL-MCNC: 6.9 G/DL (ref 6.1–8)
RBC # BLD AUTO: 4.97 MILL/MM3 (ref 4.2–5.4)
SEGMENTED NEUTROPHILS ABSOLUTE COUNT: 4.8 THOU/MM3 (ref 1.8–7.7)
SODIUM SERPL-SCNC: 143 MEQ/L (ref 135–145)
TRIGL SERPL-MCNC: 97 MG/DL (ref 0–199)
TSH SERPL DL<=0.005 MIU/L-ACNC: 1.91 UIU/ML (ref 0.4–4.2)
WBC # BLD AUTO: 7.4 THOU/MM3 (ref 4.8–10.8)

## 2023-11-21 PROCEDURE — 77063 BREAST TOMOSYNTHESIS BI: CPT

## 2023-11-22 ENCOUNTER — TELEPHONE (OUTPATIENT)
Dept: FAMILY MEDICINE CLINIC | Age: 68
End: 2023-11-22

## 2023-11-22 NOTE — TELEPHONE ENCOUNTER
----- Message from GAETANO Donnelly CNP sent at 11/22/2023  8:35 AM EST -----  Let pt know her mammogram is normal recommend 1 yr f/u

## 2023-11-22 NOTE — TELEPHONE ENCOUNTER
----- Message from GAETANO Yates CNP sent at 11/22/2023  8:24 AM EST -----  Let pt know her labs are stable, her sodium and K+ are in normal range, glucose in normal range, TSH normal, hepatic function normal, and her lipid panel is improved from previous, her CO2 level is a little elevated , normal range is up to 33 hers is 35, meaning when she exhales she does not breathe out enough carbon dioxide, this can occur when the lungs have some impairment in their functioning such as with COPD, up until now her CO2 levels have been normal, which is good, I know we did discuss COPD at her recent visit and if pt notices she is coughing a lot or wheezing, I do recommend she consider an inhaler for routine use to treat this. Let me know if she has any questions.

## 2023-11-22 NOTE — TELEPHONE ENCOUNTER
Patient informed and verbalized understanding. Patient states she had side effects with the last inhaler she had ( Trelegy)  and states the insurance does not pay for Qvar she doesn't want to start an inhaler until she gets back on [employer]. She does believe a lot of her stuffiness comes more from her sinuses and not anything else.   Will call our office back when she gets her new insurance

## 2024-02-28 DIAGNOSIS — J44.9 CHRONIC OBSTRUCTIVE PULMONARY DISEASE, UNSPECIFIED COPD TYPE (HCC): ICD-10-CM

## 2024-02-29 RX ORDER — ALBUTEROL SULFATE 90 UG/1
AEROSOL, METERED RESPIRATORY (INHALATION)
Qty: 25.5 G | Refills: 1 | Status: SHIPPED | OUTPATIENT
Start: 2024-02-29

## 2024-02-29 NOTE — TELEPHONE ENCOUNTER
Recent Visits  Date Type Provider Dept   10/18/23 Office Visit Parish Landon APRN - CNP [facility]   07/18/23 Office Visit Parish Landon APRN - CNP [facility]   Showing recent visits within past 540 days with a meds authorizing provider and meeting all other requirements  Future Appointments  Date Type Provider Dept   05/22/24 Appointment Parish Landon APRN - CNP [facility]   Showing future appointments within next 150 days with a meds authorizing provider and meeting all other requirements

## 2024-03-18 ENCOUNTER — APPOINTMENT (OUTPATIENT)
Dept: ULTRASOUND IMAGING | Age: 69
End: 2024-03-18
Payer: COMMERCIAL

## 2024-03-18 ENCOUNTER — APPOINTMENT (OUTPATIENT)
Dept: CT IMAGING | Age: 69
End: 2024-03-18
Payer: COMMERCIAL

## 2024-03-18 ENCOUNTER — HOSPITAL ENCOUNTER (EMERGENCY)
Age: 69
Discharge: HOME | End: 2024-03-18
Attending: INTERNAL MEDICINE
Payer: COMMERCIAL

## 2024-03-18 VITALS
SYSTOLIC BLOOD PRESSURE: 116 MMHG | RESPIRATION RATE: 14 BRPM | HEART RATE: 54 BPM | WEIGHT: 215 LBS | DIASTOLIC BLOOD PRESSURE: 65 MMHG | TEMPERATURE: 97.7 F | BODY MASS INDEX: 39.32 KG/M2 | OXYGEN SATURATION: 94 %

## 2024-03-18 DIAGNOSIS — N28.89 RENAL MASS: ICD-10-CM

## 2024-03-18 DIAGNOSIS — R10.30 LOWER ABDOMINAL PAIN: Primary | ICD-10-CM

## 2024-03-18 DIAGNOSIS — N39.0 URINARY TRACT INFECTION WITHOUT HEMATURIA, SITE UNSPECIFIED: ICD-10-CM

## 2024-03-18 LAB
ALBUMIN SERPL BCG-MCNC: 4.1 G/DL (ref 3.5–5.1)
ALP SERPL-CCNC: 102 U/L (ref 38–126)
ALT SERPL W/O P-5'-P-CCNC: 6 U/L (ref 11–66)
ANION GAP SERPL CALC-SCNC: 11 MEQ/L (ref 8–16)
AST SERPL-CCNC: 19 U/L (ref 5–40)
BACTERIA: ABNORMAL
BASOPHILS ABSOLUTE: 0 THOU/MM3 (ref 0–0.1)
BASOPHILS NFR BLD AUTO: 0.3 %
BILIRUB CONJ SERPL-MCNC: < 0.2 MG/DL (ref 0–0.3)
BILIRUB SERPL-MCNC: 0.4 MG/DL (ref 0.3–1.2)
BILIRUB UR QL STRIP: NEGATIVE
BUN SERPL-MCNC: 10 MG/DL (ref 7–22)
CALCIUM SERPL-MCNC: 10 MG/DL (ref 8.5–10.5)
CASTS #/AREA URNS LPF: ABNORMAL /LPF
CASTS #/AREA URNS LPF: ABNORMAL /LPF
CHARACTER UR: ABNORMAL
CHARCOAL URNS QL MICRO: ABNORMAL
CHLORIDE SERPL-SCNC: 95 MEQ/L (ref 98–111)
CO2 SERPL-SCNC: 31 MEQ/L (ref 23–33)
COLOR UR: YELLOW
CREAT SERPL-MCNC: 0.6 MG/DL (ref 0.4–1.2)
CRYSTALS URNS QL MICRO: ABNORMAL
DEPRECATED RDW RBC AUTO: 46.2 FL (ref 35–45)
EKG ATRIAL RATE: 56 BPM
EKG P AXIS: 71 DEGREES
EKG P-R INTERVAL: 126 MS
EKG Q-T INTERVAL: 436 MS
EKG QRS DURATION: 90 MS
EKG QTC CALCULATION (BAZETT): 420 MS
EKG R AXIS: -23 DEGREES
EKG T AXIS: -57 DEGREES
EKG VENTRICULAR RATE: 56 BPM
EOSINOPHIL NFR BLD AUTO: 0.2 %
EOSINOPHILS ABSOLUTE: 0 THOU/MM3 (ref 0–0.4)
EPITHELIAL CELLS, UA: ABNORMAL /HPF
ERYTHROCYTE [DISTWIDTH] IN BLOOD BY AUTOMATED COUNT: 13.1 % (ref 11.5–14.5)
GFR SERPL CREATININE-BSD FRML MDRD: > 60 ML/MIN/1.73M2
GLUCOSE SERPL-MCNC: 134 MG/DL (ref 70–108)
GLUCOSE UR QL STRIP.AUTO: NEGATIVE MG/DL
HCT VFR BLD AUTO: 52.2 % (ref 37–47)
HGB BLD-MCNC: 16.3 GM/DL (ref 12–16)
HGB UR QL STRIP.AUTO: NEGATIVE
IMM GRANULOCYTES # BLD AUTO: 0.04 THOU/MM3 (ref 0–0.07)
IMM GRANULOCYTES NFR BLD AUTO: 0.3 %
KETONES UR QL STRIP.AUTO: 15
LEUKOCYTE ESTERASE UR QL STRIP.AUTO: ABNORMAL
LIPASE SERPL-CCNC: 15.4 U/L (ref 5.6–51.3)
LYMPHOCYTES ABSOLUTE: 0.6 THOU/MM3 (ref 1–4.8)
LYMPHOCYTES NFR BLD AUTO: 5 %
MCH RBC QN AUTO: 29.8 PG (ref 26–33)
MCHC RBC AUTO-ENTMCNC: 31.2 GM/DL (ref 32.2–35.5)
MCV RBC AUTO: 95.4 FL (ref 81–99)
MONOCYTES ABSOLUTE: 0.4 THOU/MM3 (ref 0.4–1.3)
MONOCYTES NFR BLD AUTO: 2.9 %
NEUTROPHILS NFR BLD AUTO: 91.3 %
NITRITE UR QL STRIP.AUTO: NEGATIVE
NRBC BLD AUTO-RTO: 0 /100 WBC
OSMOLALITY SERPL CALC.SUM OF ELEC: 274.8 MOSMOL/KG (ref 275–300)
PH UR STRIP.AUTO: 6.5 [PH] (ref 5–9)
PLATELET # BLD AUTO: 298 THOU/MM3 (ref 130–400)
PMV BLD AUTO: 10.4 FL (ref 9.4–12.4)
POTASSIUM SERPL-SCNC: 4.5 MEQ/L (ref 3.5–5.2)
PROT SERPL-MCNC: 7.4 G/DL (ref 6.1–8)
PROT UR STRIP.AUTO-MCNC: ABNORMAL MG/DL
RBC # BLD AUTO: 5.47 MILL/MM3 (ref 4.2–5.4)
RBC #/AREA URNS HPF: ABNORMAL /HPF
RENAL EPI CELLS #/AREA URNS HPF: ABNORMAL /[HPF]
SEGMENTED NEUTROPHILS ABSOLUTE COUNT: 11 THOU/MM3 (ref 1.8–7.7)
SODIUM SERPL-SCNC: 137 MEQ/L (ref 135–145)
SPECIFIC GRAVITY UA: 1.02 (ref 1–1.03)
TROPONIN, HIGH SENSITIVITY: 22 NG/L (ref 0–12)
UROBILINOGEN, URINE: 1 EU/DL (ref 0–1)
WBC # BLD AUTO: 12.1 THOU/MM3 (ref 4.8–10.8)
WBC #/AREA URNS HPF: ABNORMAL /HPF
YEAST LIKE FUNGI URNS QL MICRO: ABNORMAL

## 2024-03-18 PROCEDURE — 96375 TX/PRO/DX INJ NEW DRUG ADDON: CPT

## 2024-03-18 PROCEDURE — 81001 URINALYSIS AUTO W/SCOPE: CPT

## 2024-03-18 PROCEDURE — 74176 CT ABD & PELVIS W/O CONTRAST: CPT

## 2024-03-18 PROCEDURE — 93010 ELECTROCARDIOGRAM REPORT: CPT | Performed by: INTERNAL MEDICINE

## 2024-03-18 PROCEDURE — 85025 COMPLETE CBC W/AUTO DIFF WBC: CPT

## 2024-03-18 PROCEDURE — 76770 US EXAM ABDO BACK WALL COMP: CPT

## 2024-03-18 PROCEDURE — 2580000003 HC RX 258: Performed by: INTERNAL MEDICINE

## 2024-03-18 PROCEDURE — 99284 EMERGENCY DEPT VISIT MOD MDM: CPT

## 2024-03-18 PROCEDURE — 36415 COLL VENOUS BLD VENIPUNCTURE: CPT

## 2024-03-18 PROCEDURE — 83690 ASSAY OF LIPASE: CPT

## 2024-03-18 PROCEDURE — 84484 ASSAY OF TROPONIN QUANT: CPT

## 2024-03-18 PROCEDURE — 96374 THER/PROPH/DIAG INJ IV PUSH: CPT

## 2024-03-18 PROCEDURE — 80053 COMPREHEN METABOLIC PANEL: CPT

## 2024-03-18 PROCEDURE — 6360000002 HC RX W HCPCS: Performed by: INTERNAL MEDICINE

## 2024-03-18 PROCEDURE — 93005 ELECTROCARDIOGRAM TRACING: CPT | Performed by: INTERNAL MEDICINE

## 2024-03-18 RX ORDER — SULFAMETHOXAZOLE AND TRIMETHOPRIM 800; 160 MG/1; MG/1
1 TABLET ORAL 2 TIMES DAILY
Qty: 14 TABLET | Refills: 0 | Status: SHIPPED | OUTPATIENT
Start: 2024-03-18 | End: 2024-03-25

## 2024-03-18 RX ORDER — MORPHINE SULFATE 2 MG/ML
2 INJECTION, SOLUTION INTRAMUSCULAR; INTRAVENOUS ONCE
Status: COMPLETED | OUTPATIENT
Start: 2024-03-18 | End: 2024-03-18

## 2024-03-18 RX ORDER — ONDANSETRON 2 MG/ML
4 INJECTION INTRAMUSCULAR; INTRAVENOUS ONCE
Status: COMPLETED | OUTPATIENT
Start: 2024-03-18 | End: 2024-03-18

## 2024-03-18 RX ORDER — ONDANSETRON 4 MG/1
4 TABLET, FILM COATED ORAL EVERY 8 HOURS PRN
Qty: 20 TABLET | Refills: 0 | Status: SHIPPED | OUTPATIENT
Start: 2024-03-18 | End: 2024-04-07

## 2024-03-18 RX ORDER — 0.9 % SODIUM CHLORIDE 0.9 %
500 INTRAVENOUS SOLUTION INTRAVENOUS ONCE
Status: COMPLETED | OUTPATIENT
Start: 2024-03-18 | End: 2024-03-18

## 2024-03-18 RX ADMIN — ONDANSETRON 4 MG: 2 INJECTION INTRAMUSCULAR; INTRAVENOUS at 16:35

## 2024-03-18 RX ADMIN — MORPHINE SULFATE 2 MG: 2 INJECTION, SOLUTION INTRAMUSCULAR; INTRAVENOUS at 16:35

## 2024-03-18 RX ADMIN — SODIUM CHLORIDE 500 ML: 9 INJECTION, SOLUTION INTRAVENOUS at 16:34

## 2024-03-18 NOTE — ED PROVIDER NOTES
SIGNS: /65   Pulse 54   Temp 97.7 °F (36.5 °C) (Oral)   Resp 14   Wt 97.5 kg (215 lb)   SpO2 94%   BMI 39.32 kg/m²    Constitutional:  Well developed, Well nourished, No acute distress, Non-toxic appearance.   HENT:  Normocephalic, Atraumatic, Bilateral external ears normal, Oropharynx moist, No oral exudates, Nose normal. Neck- Normal range of motion, No tenderness, Supple, No stridor.   Eyes:  PERRL, EOMI, Conjunctiva normal, No discharge.   Respiratory:  Normal breath sounds, No respiratory distress, No wheezing, No chest tenderness.   Cardiovascular:  Normal heart rate, Normal rhythm, No murmurs, No rubs, No gallops.   GI:  Bowel sounds normal, Soft,   some abdominal tenderness, No masses, No pulsatile masses.   : External genitalia appear normal, No masses or lesions. No discharge. No CVA tenderness.   Musculoskeletal:  Intact distal pulses, No edema, No tenderness, No cyanosis, No clubbing. Good range of motion in all major joints. No tenderness to palpation or major deformities noted. Back- No tenderness.   Integument:  Warm, Dry, No erythema, No rash.   Lymphatic:  No lymphadenopathy noted.   Neurologic:  Alert & oriented x 3, Normal motor function, Normal sensory function, No focal deficits noted.   Psychiatric:  Affect normal, Judgment normal, Mood normal.     EKG    Sinus bradycardia with pulse of 56 nonspecific T wave changes  RADIOLOGY    US RENAL COMPLETE   Final Result   1. 2.3 cm mildly complex left renal cyst with apparent peripheral nodule    which could represent a solid component. Recommend further evaluation with    multiphase CT or MRI when clinically appropriate.      This document has been electronically signed by: Miriam King MD on    03/18/2024 08:30 PM      CT ABDOMEN PELVIS WO CONTRAST Additional Contrast? None   Final Result   1. Fluid distention of small bowel and the proximal colon suggesting    possible gastroenteritis.   2. Severe colonic diverticulosis without

## 2024-04-24 ENCOUNTER — TELEPHONE (OUTPATIENT)
Dept: FAMILY MEDICINE CLINIC | Age: 69
End: 2024-04-24

## 2024-04-24 NOTE — TELEPHONE ENCOUNTER
Pt called stating she feels she is having side effects from Singulair, which she had started in September daily. Pt is having an increase in her cough x's 3 weeks along with stuffy nose and having to use her inhaler in the mornings. Pt did not take the Singulair last night and did not have the cough or stuffy nose and did not have to use her inhaler.    Pt is stopping the Singulair at this time and will up date Jan at her appointment.

## 2024-05-22 ENCOUNTER — OFFICE VISIT (OUTPATIENT)
Dept: FAMILY MEDICINE CLINIC | Age: 69
End: 2024-05-22
Payer: MEDICARE

## 2024-05-22 VITALS
HEIGHT: 62 IN | OXYGEN SATURATION: 96 % | RESPIRATION RATE: 16 BRPM | HEART RATE: 65 BPM | SYSTOLIC BLOOD PRESSURE: 118 MMHG | TEMPERATURE: 97.5 F | DIASTOLIC BLOOD PRESSURE: 82 MMHG | BODY MASS INDEX: 39.6 KG/M2 | WEIGHT: 215.2 LBS

## 2024-05-22 DIAGNOSIS — Z00.00 MEDICARE ANNUAL WELLNESS VISIT, SUBSEQUENT: Primary | ICD-10-CM

## 2024-05-22 DIAGNOSIS — J44.9 CHRONIC OBSTRUCTIVE PULMONARY DISEASE, UNSPECIFIED COPD TYPE (HCC): ICD-10-CM

## 2024-05-22 DIAGNOSIS — J30.89 NON-SEASONAL ALLERGIC RHINITIS DUE TO OTHER ALLERGIC TRIGGER: ICD-10-CM

## 2024-05-22 DIAGNOSIS — I10 ESSENTIAL HYPERTENSION: ICD-10-CM

## 2024-05-22 DIAGNOSIS — E66.01 CLASS 2 SEVERE OBESITY DUE TO EXCESS CALORIES WITH SERIOUS COMORBIDITY AND BODY MASS INDEX (BMI) OF 39.0 TO 39.9 IN ADULT (HCC): ICD-10-CM

## 2024-05-22 DIAGNOSIS — H35.30 MACULAR DEGENERATION OF BOTH EYES, UNSPECIFIED TYPE: ICD-10-CM

## 2024-05-22 DIAGNOSIS — Z00.01 ENCOUNTER FOR GENERAL ADULT MEDICAL EXAMINATION WITH ABNORMAL FINDINGS: ICD-10-CM

## 2024-05-22 DIAGNOSIS — Z87.891 PERSONAL HISTORY OF TOBACCO USE: ICD-10-CM

## 2024-05-22 PROCEDURE — G0439 PPPS, SUBSEQ VISIT: HCPCS | Performed by: NURSE PRACTITIONER

## 2024-05-22 PROCEDURE — 1123F ACP DISCUSS/DSCN MKR DOCD: CPT | Performed by: NURSE PRACTITIONER

## 2024-05-22 PROCEDURE — 3074F SYST BP LT 130 MM HG: CPT | Performed by: NURSE PRACTITIONER

## 2024-05-22 PROCEDURE — 3079F DIAST BP 80-89 MM HG: CPT | Performed by: NURSE PRACTITIONER

## 2024-05-22 PROCEDURE — 99213 OFFICE O/P EST LOW 20 MIN: CPT | Performed by: NURSE PRACTITIONER

## 2024-05-22 PROCEDURE — G0296 VISIT TO DETERM LDCT ELIG: HCPCS | Performed by: NURSE PRACTITIONER

## 2024-05-22 RX ORDER — LISINOPRIL 10 MG/1
10 TABLET ORAL DAILY
Qty: 90 TABLET | Refills: 3 | Status: SHIPPED | OUTPATIENT
Start: 2024-05-22

## 2024-05-22 SDOH — ECONOMIC STABILITY: FOOD INSECURITY: WITHIN THE PAST 12 MONTHS, THE FOOD YOU BOUGHT JUST DIDN'T LAST AND YOU DIDN'T HAVE MONEY TO GET MORE.: NEVER TRUE

## 2024-05-22 SDOH — ECONOMIC STABILITY: FOOD INSECURITY: WITHIN THE PAST 12 MONTHS, YOU WORRIED THAT YOUR FOOD WOULD RUN OUT BEFORE YOU GOT MONEY TO BUY MORE.: NEVER TRUE

## 2024-05-22 SDOH — ECONOMIC STABILITY: INCOME INSECURITY: HOW HARD IS IT FOR YOU TO PAY FOR THE VERY BASICS LIKE FOOD, HOUSING, MEDICAL CARE, AND HEATING?: NOT HARD AT ALL

## 2024-05-22 ASSESSMENT — PATIENT HEALTH QUESTIONNAIRE - PHQ9
SUM OF ALL RESPONSES TO PHQ QUESTIONS 1-9: 0
SUM OF ALL RESPONSES TO PHQ9 QUESTIONS 1 & 2: 0
SUM OF ALL RESPONSES TO PHQ QUESTIONS 1-9: 0
1. LITTLE INTEREST OR PLEASURE IN DOING THINGS: NOT AT ALL
2. FEELING DOWN, DEPRESSED OR HOPELESS: NOT AT ALL
SUM OF ALL RESPONSES TO PHQ QUESTIONS 1-9: 0
SUM OF ALL RESPONSES TO PHQ QUESTIONS 1-9: 0

## 2024-05-22 ASSESSMENT — LIFESTYLE VARIABLES
HOW MANY STANDARD DRINKS CONTAINING ALCOHOL DO YOU HAVE ON A TYPICAL DAY: PATIENT DOES NOT DRINK
HOW OFTEN DO YOU HAVE A DRINK CONTAINING ALCOHOL: NEVER

## 2024-05-22 NOTE — ASSESSMENT & PLAN NOTE
Unclear control, continue current medications  had qvar ordered and singulair states singulair made her cough so she stopped it still coughs on occasion

## 2024-05-22 NOTE — PROGRESS NOTES
(215 lb 3.2 oz)   Height: 1.575 m (5' 2\")      Body mass index is 39.36 kg/m².      General Appearance: alert and oriented to person, place and time, well-developed and well-nourished, in no acute distress  Skin: warm and dry, no rash or erythema  ENT: tympanic membrane, external ear and ear canal normal bilaterally, oropharynx clear and moist with normal mucous membranes  Neck: neck supple and non tender without mass, no thyromegaly or thyroid nodules, no cervical lymphadenopathy   Pulmonary/Chest: clear to auscultation bilaterally- no wheezes, rales or rhonchi, normal air movement, no respiratory distress and no chest wall tenderness, lose congested cough   Cardiovascular: normal rate, normal S1 and S2, no gallops, intact distal pulses, and no carotid bruits  Extremities: no cyanosis, no clubbing, and no edema  Musculoskeletal: no swollen joints, osteoarthritic changes noted in both hands, and knee replacement, decreased ROM right knee, no swelling arhtritis   Neurologic: gait and coordination normal, speech normal, and sensation to light touch intact       Allergies   Allergen Reactions    Flonase [Fluticasone Propionate] Other (See Comments)     Causes Epistaxis    Mucinex D [Pseudoephedrine-Guaifenesin Er] Other (See Comments)     Doesn't work, makes congestion worse.       Prior to Visit Medications    Medication Sig Taking? Authorizing Provider   beclomethasone (QVAR REDIHALER) 40 MCG/ACT AERB inhaler INHALE 1 PUFF INTO THE LUNGS IN THE MORNING AND IN THE EVENING Yes Parish Landon APRN - CNP   lisinopril (PRINIVIL;ZESTRIL) 10 MG tablet Take 1 tablet by mouth daily Yes Parish Landon APRN - CNP   albuterol sulfate HFA (PROVENTIL;VENTOLIN;PROAIR) 108 (90 Base) MCG/ACT inhaler INHALE 2 PUFFS BY MOUTH EVERY 4 HOURS AS NEEDED FOR COUGH Yes Parish Landon APRN - CNP   predniSONE (DELTASONE) 20 MG tablet 1 tab po tid for 3 days 1 tab po bid for 3 days 1 tab po once day for 3 days Yes Parish Landon APRN - CNP

## 2024-05-22 NOTE — PATIENT INSTRUCTIONS
risk of dying from lung cancer in a small number of people.  How is it done?  Lung cancer screening is done with a low-dose CT (computed tomography) scan. A CT scan uses X-rays, or radiation, to make detailed pictures of your body. Experts recommend that screening be done in medical centers that focus on finding and treating lung cancer.  Who is screening recommended for?  Lung cancer screening is recommended for people age 50 and older who are or were heavy smokers. That means people with a smoking history of at least 20 pack years. A pack year is a way to measure how heavy a smoker you are or were.  To figure out your pack years, multiply how many packs a day on average (assuming 20 cigarettes per pack) you have smoked by how many years you have smoked. For example:  If you smoked 1 pack a day for 20 years, that's 1 times 20. So you have a smoking history of 20 pack years.  If you smoked 2 packs a day for 10 years, that's 2 times 10. So you have a smoking history of 20 pack years.  Experts agree that screening is for people who have a high risk of lung cancer. But experts don't agree on what high risk means. Some say people age 50 or older with at least a 20-pack-year smoking history are high risk. Others say it's people age 55 or older with a 30-pack-year history.  To see if you could benefit from screening, first find out if you are at high risk for lung cancer. Your doctor can help you decide your lung cancer risk.  What are the risks of screening?  CT screening for lung cancer isn't perfect. It can show an abnormal result when it turns out there wasn't any cancer. This is called a false-positive result. This means you may need more tests to make sure you don't have cancer. These tests can be harmful and cause a lot of worry.  These tests may include more CT scans and invasive testing like a lung biopsy. In a biopsy, the doctor takes a sample of tissue from inside your lung so it can be looked at under a

## 2024-06-11 ENCOUNTER — OFFICE VISIT (OUTPATIENT)
Dept: FAMILY MEDICINE CLINIC | Age: 69
End: 2024-06-11
Payer: MEDICARE

## 2024-06-11 ENCOUNTER — TELEPHONE (OUTPATIENT)
Dept: FAMILY MEDICINE CLINIC | Age: 69
End: 2024-06-11

## 2024-06-11 VITALS
TEMPERATURE: 97.7 F | WEIGHT: 215.4 LBS | OXYGEN SATURATION: 96 % | DIASTOLIC BLOOD PRESSURE: 72 MMHG | BODY MASS INDEX: 39.64 KG/M2 | HEIGHT: 62 IN | SYSTOLIC BLOOD PRESSURE: 118 MMHG | HEART RATE: 58 BPM | RESPIRATION RATE: 14 BRPM

## 2024-06-11 DIAGNOSIS — R06.2 WHEEZING: ICD-10-CM

## 2024-06-11 DIAGNOSIS — J01.00 ACUTE NON-RECURRENT MAXILLARY SINUSITIS: Primary | ICD-10-CM

## 2024-06-11 DIAGNOSIS — J40 BRONCHITIS: ICD-10-CM

## 2024-06-11 DIAGNOSIS — J44.9 CHRONIC OBSTRUCTIVE PULMONARY DISEASE, UNSPECIFIED COPD TYPE (HCC): ICD-10-CM

## 2024-06-11 DIAGNOSIS — Z72.0 TOBACCO ABUSE: ICD-10-CM

## 2024-06-11 PROCEDURE — 99214 OFFICE O/P EST MOD 30 MIN: CPT | Performed by: NURSE PRACTITIONER

## 2024-06-11 PROCEDURE — 3078F DIAST BP <80 MM HG: CPT | Performed by: NURSE PRACTITIONER

## 2024-06-11 PROCEDURE — 1123F ACP DISCUSS/DSCN MKR DOCD: CPT | Performed by: NURSE PRACTITIONER

## 2024-06-11 PROCEDURE — 3074F SYST BP LT 130 MM HG: CPT | Performed by: NURSE PRACTITIONER

## 2024-06-11 RX ORDER — CEFDINIR 300 MG/1
300 CAPSULE ORAL 2 TIMES DAILY
Qty: 14 CAPSULE | Refills: 0 | Status: SHIPPED | OUTPATIENT
Start: 2024-06-11 | End: 2024-06-18

## 2024-06-11 RX ORDER — PREDNISONE 20 MG/1
TABLET ORAL
Qty: 18 TABLET | Refills: 0 | Status: SHIPPED | OUTPATIENT
Start: 2024-06-11

## 2024-06-11 NOTE — PROGRESS NOTES
SUBJECTIVE:  Zoey Mcdaniel is a 68 y.o. y/o female that presents with Headache (Pt states that she has had a headache and congestion x2 weeks ) and Wheezing  .    HPI:      Symptoms have been present for 10 day(s).  Symptoms are unchanged since they initially started.    Fever? No  Runny nose or congestion?  Yes   Cough?  Yes  Sore throat?  No  Headache, fatigue, joint pains, muscle aches?  Yes - left sided facial pressure and H/A supra and infraorbital   Shortness of breath/Wheezing?  Yes - she has been wheezing, coughs daily has been Dx with COPD, only uses qvar prn, has declined an inhaler change in the past , using albuterol more   Nausea/Vomiting/Diarrhea?  No  Double Sickening?  No  Sick contacts? No  Known COVID exposures of RFs?  No  Smoker?  Yes  Preexisting Respiratory conditions?  Yes    Patient has tried naprosyn for the headache  with out improvement.      Past Medical History:   Diagnosis Date    Asthma     Chronic back pain     COPD (chronic obstructive pulmonary disease) ([facility])     Diverticulosis     Essential hypertension 11/5/2019    Hx of blood clots 1970    leg    Obesity     Osteoarthritis     PONV (postoperative nausea and vomiting)     with knee replacement only and patient thinks that was from the nerve block    Tobacco abuse        Social History     Socioeconomic History    Marital status: Single     Spouse name: Not on file    Number of children: Not on file    Years of education: Not on file    Highest education level: Not on file   Occupational History    Not on file   Tobacco Use    Smoking status: Every Day     Current packs/day: 0.75     Average packs/day: 0.8 packs/day for 40.0 years (30.0 ttl pk-yrs)     Types: Cigarettes    Smokeless tobacco: Never   Vaping Use    Vaping Use: Former   Substance and Sexual Activity    Alcohol use: Yes     Comment: rarely    Drug use: No    Sexual activity: Not Currently   Other Topics Concern    Not on file   Social History Narrative    Not on

## 2024-06-11 NOTE — TELEPHONE ENCOUNTER
Pt called with concerns of a sinus infection. Pt's symptoms include sinus congestion,nasal drainage, sinus pressure, and congestion.    Pt did schedule an appointment for today, but wanted a message sent incase appointment was not needed.    Please advise

## 2024-07-23 DIAGNOSIS — J44.9 CHRONIC OBSTRUCTIVE PULMONARY DISEASE, UNSPECIFIED COPD TYPE (HCC): ICD-10-CM

## 2024-07-23 RX ORDER — ALBUTEROL SULFATE 90 UG/1
AEROSOL, METERED RESPIRATORY (INHALATION)
Qty: 25.5 G | Refills: 1 | Status: SHIPPED | OUTPATIENT
Start: 2024-07-23

## 2024-07-23 NOTE — TELEPHONE ENCOUNTER
Recent Visits  Date Type Provider Dept   06/11/24 Office Visit DipeshParish, APRN - CNP [facility]   05/22/24 Office Visit DipeshParish, APRN - CNP [facility]   10/18/23 Office Visit DipeshParish, APRN - CNP [facility]   07/18/23 Office Visit Landon, Jan, APRN - CNP [facility]   Showing recent visits within past 540 days with a meds authorizing provider and meeting all other requirements  Future Appointments  No visits were found meeting these conditions.  Showing future appointments within next 150 days with a meds authorizing provider and meeting all other requirements

## 2024-10-03 DIAGNOSIS — M17.0 PRIMARY OSTEOARTHRITIS OF BOTH KNEES: ICD-10-CM

## 2024-10-04 RX ORDER — NAPROXEN 250 MG/1
250 TABLET ORAL 2 TIMES DAILY PRN
Qty: 60 TABLET | Refills: 2 | Status: SHIPPED | OUTPATIENT
Start: 2024-10-04

## 2024-10-10 ENCOUNTER — OFFICE VISIT (OUTPATIENT)
Dept: FAMILY MEDICINE CLINIC | Age: 69
End: 2024-10-10
Payer: MEDICARE

## 2024-10-10 VITALS
BODY MASS INDEX: 39.79 KG/M2 | SYSTOLIC BLOOD PRESSURE: 122 MMHG | HEART RATE: 70 BPM | TEMPERATURE: 97.6 F | HEIGHT: 62 IN | OXYGEN SATURATION: 97 % | WEIGHT: 216.2 LBS | DIASTOLIC BLOOD PRESSURE: 72 MMHG | RESPIRATION RATE: 16 BRPM

## 2024-10-10 DIAGNOSIS — J44.1 COPD EXACERBATION (HCC): ICD-10-CM

## 2024-10-10 DIAGNOSIS — J01.90 ACUTE RHINOSINUSITIS: Primary | ICD-10-CM

## 2024-10-10 PROCEDURE — 3074F SYST BP LT 130 MM HG: CPT | Performed by: NURSE PRACTITIONER

## 2024-10-10 PROCEDURE — 99214 OFFICE O/P EST MOD 30 MIN: CPT | Performed by: NURSE PRACTITIONER

## 2024-10-10 PROCEDURE — 3078F DIAST BP <80 MM HG: CPT | Performed by: NURSE PRACTITIONER

## 2024-10-10 PROCEDURE — 1123F ACP DISCUSS/DSCN MKR DOCD: CPT | Performed by: NURSE PRACTITIONER

## 2024-10-10 RX ORDER — AMOXICILLIN 500 MG/1
CAPSULE ORAL
COMMUNITY
Start: 2024-07-11 | End: 2024-10-10

## 2024-10-10 RX ORDER — DOXYCYCLINE HYCLATE 100 MG
100 TABLET ORAL 2 TIMES DAILY
Qty: 20 TABLET | Refills: 0 | Status: SHIPPED | OUTPATIENT
Start: 2024-10-10 | End: 2024-10-20

## 2024-10-10 RX ORDER — PREDNISONE 20 MG/1
40 TABLET ORAL DAILY
Qty: 10 TABLET | Refills: 0 | Status: SHIPPED | OUTPATIENT
Start: 2024-10-10 | End: 2024-10-15

## 2024-10-10 NOTE — PROGRESS NOTES
Resource Strain: Low Risk  (5/22/2024)    Overall Financial Resource Strain (CARDIA)     Difficulty of Paying Living Expenses: Not hard at all   Food Insecurity: No Food Insecurity (5/22/2024)    Hunger Vital Sign     Worried About Running Out of Food in the Last Year: Never true     Ran Out of Food in the Last Year: Never true   Transportation Needs: Unknown (5/22/2024)    PRAPARE - Transportation     Lack of Transportation (Medical): Not on file     Lack of Transportation (Non-Medical): No   Physical Activity: Inactive (5/22/2024)    Exercise Vital Sign     Days of Exercise per Week: 0 days     Minutes of Exercise per Session: 0 min   Stress: Not on file   Social Connections: Not on file   Intimate Partner Violence: Not At Risk (7/15/2023)    Humiliation, Afraid, Rape, and Kick questionnaire     Fear of Current or Ex-Partner: No     Emotionally Abused: No     Physically Abused: No     Sexually Abused: No   Housing Stability: Unknown (5/22/2024)    Housing Stability Vital Sign     Unable to Pay for Housing in the Last Year: Not on file     Number of Places Lived in the Last Year: Not on file     Unstable Housing in the Last Year: No       Family History   Problem Relation Age of Onset    High Blood Pressure Mother     Stroke Mother     Cancer Mother         skin    Diabetes Father     Cancer Maternal Grandmother     Heart Disease Maternal Grandmother            OBJECTIVE:  /72   Pulse 70   Temp 97.6 °F (36.4 °C) (Temporal)   Resp 16   Ht 1.575 m (5' 2\")   Wt 98.1 kg (216 lb 3.2 oz)   SpO2 97%   BMI 39.54 kg/m²   General appearance: alert, well appearing, and in no distress.  ENT exam reveals - bilateral TM normal without fluid or infection, neck without nodes, throat normal without erythema or exudate, bilat maxillary sinus tender and left frontal sinus tenderness, and nasal mucosa pale and congested.   CVS exam: normal rate, regular rhythm, normal S1, S2, no murmurs, rubs, clicks or

## 2024-10-14 ENCOUNTER — TELEPHONE (OUTPATIENT)
Dept: FAMILY MEDICINE CLINIC | Age: 69
End: 2024-10-14

## 2024-10-14 DIAGNOSIS — R09.81 HEAD CONGESTION: Primary | ICD-10-CM

## 2024-10-14 DIAGNOSIS — R09.89 CHEST CONGESTION: ICD-10-CM

## 2024-10-14 RX ORDER — PREDNISONE 20 MG/1
TABLET ORAL
Qty: 18 TABLET | Refills: 0 | Status: SHIPPED | OUTPATIENT
Start: 2024-10-14

## 2024-10-14 NOTE — TELEPHONE ENCOUNTER
Pt states she is still not feeling better. She states that her ears are plugging up. She doesn't think that the medication is working. Headache is still there. Still having the congestion. Not bringing anything up. Is there anything else she can get?

## 2024-10-14 NOTE — TELEPHONE ENCOUNTER
I am sorry to hear she is not feeling better. We can extend the steroid course for a longer period of time to help alleviate the nasal congestion,and ear plugging , and headache,  is she using the albuterol and qvar BID? If cough no better we can consider an different inhaler that may work better. Mucinex for chest congestion,

## 2024-10-14 NOTE — TELEPHONE ENCOUNTER
Pt states she will purchase the Mucinex over the counter, pt is using the Albuterol which is helping.  Pt was informed of the 2nd round of prednisone sent to the pharmacy.

## 2024-11-25 DIAGNOSIS — J44.9 CHRONIC OBSTRUCTIVE PULMONARY DISEASE, UNSPECIFIED COPD TYPE (HCC): ICD-10-CM

## 2024-11-25 RX ORDER — ALBUTEROL SULFATE 90 UG/1
INHALANT RESPIRATORY (INHALATION)
Qty: 25.5 G | Refills: 1 | Status: SHIPPED | OUTPATIENT
Start: 2024-11-25

## 2024-11-25 NOTE — TELEPHONE ENCOUNTER
Recent Visits  Date Type Provider Dept   10/10/24 Office Visit Marshall Rascon, APRN - CNP [facility]   06/11/24 Office Visit Parish Landon APRN - CNP [facility]   05/22/24 Office Visit Parish Landon APRN - CNP [facility]   10/18/23 Office Visit Parish Landon APRN - CNP [facility]   07/18/23 Office Visit Parish Landon APRN - CNP [facility]   Showing recent visits within past 540 days with a meds authorizing provider and meeting all other requirements  Future Appointments  No visits were found meeting these conditions.  Showing future appointments within next 150 days with a meds authorizing provider and meeting all other requirements

## 2024-12-18 ENCOUNTER — HOSPITAL ENCOUNTER (OUTPATIENT)
Dept: WOMENS IMAGING | Age: 69
Discharge: HOME | End: 2024-12-18
Payer: MEDICARE

## 2024-12-18 VITALS — HEIGHT: 62 IN | WEIGHT: 212 LBS | BODY MASS INDEX: 39.01 KG/M2

## 2024-12-18 DIAGNOSIS — Z12.31 VISIT FOR SCREENING MAMMOGRAM: ICD-10-CM

## 2024-12-18 PROCEDURE — 77063 BREAST TOMOSYNTHESIS BI: CPT

## 2024-12-30 ENCOUNTER — TELEPHONE (OUTPATIENT)
Dept: FAMILY MEDICINE CLINIC | Age: 69
End: 2024-12-30

## 2024-12-30 DIAGNOSIS — I10 ESSENTIAL HYPERTENSION: ICD-10-CM

## 2024-12-30 DIAGNOSIS — J44.9 CHRONIC OBSTRUCTIVE PULMONARY DISEASE, UNSPECIFIED COPD TYPE (HCC): ICD-10-CM

## 2024-12-30 RX ORDER — LISINOPRIL 10 MG/1
10 TABLET ORAL DAILY
Qty: 90 TABLET | Refills: 3 | Status: SHIPPED | OUTPATIENT
Start: 2024-12-30

## 2024-12-30 NOTE — TELEPHONE ENCOUNTER
Paper scripts printed out for the Qvar inhaler and lisinopril pt can  in office today , they will be at  , please let pt know

## 2024-12-30 NOTE — TELEPHONE ENCOUNTER
Pt called and is asking for a paper script for her Lisinopril and her Beclomethasone inhaler. She does not want them electronically sent. She stated half the time the pharmacy does not get the script and \"she needs it today\".

## 2025-03-14 DIAGNOSIS — J44.9 CHRONIC OBSTRUCTIVE PULMONARY DISEASE, UNSPECIFIED COPD TYPE (HCC): ICD-10-CM

## 2025-03-14 RX ORDER — ALBUTEROL SULFATE 90 UG/1
INHALANT RESPIRATORY (INHALATION)
Qty: 25.5 G | Refills: 1 | Status: SHIPPED | OUTPATIENT
Start: 2025-03-14 | End: 2025-03-14 | Stop reason: SDUPTHER

## 2025-03-14 RX ORDER — ALBUTEROL SULFATE 90 UG/1
INHALANT RESPIRATORY (INHALATION)
Qty: 25.5 G | Refills: 1 | Status: SHIPPED | OUTPATIENT
Start: 2025-03-14

## 2025-03-14 NOTE — TELEPHONE ENCOUNTER
Recent Visits  Date Type Provider Dept   10/10/24 Office Visit Marshall Rascon APRN - CNP [facility]   06/11/24 Office Visit Parish Landon APRN - CNP [facility]   05/22/24 Office Visit Parish Landon APRN - CNP [facility]   10/18/23 Office Visit Parish Landon APRN - CNP [facility]   Showing recent visits within past 540 days with a meds authorizing provider and meeting all other requirements  Future Appointments  Date Type Provider Dept   05/28/25 Appointment Parish Landon APRN - CNP [facility]   Showing future appointments within next 150 days with a meds authorizing provider and meeting all other requirements

## 2025-03-17 NOTE — PROGRESS NOTES
alert and oriented to person, place and time, well developed and well- nourished, in no acute distress  Skin: warm and dry, no rash or erythema  Head: normocephalic and atraumatic  Eyes: pupils equal, round, and reactive to light, extraocular eye movements intact, conjunctivae normal  ENT: Right cerumen impaction noted  Neck: supple and non-tender without mass, no thyromegaly or thyroid nodules, no cervical lymphadenopathy  Pulmonary/Chest: clear to auscultation bilaterally- no wheezes, rales or rhonchi, normal air movement, no respiratory distress  Cardiovascular: normal rate, regular rhythm, normal S1 and S2, no murmurs, rubs, clicks, or gallops, distal pulses intact, no carotid bruits  Abdomen: soft, non-tender, non-distended, normal bowel sounds, no masses or organomegaly  Extremities: no cyanosis, clubbing or edema  Musculoskeletal: normal range of motion, no joint swelling, deformity or tenderness    Lab Results   Component Value Date     11/22/2021    K 5.1 11/22/2021     11/22/2021    CO2 30 11/22/2021    BUN 11 11/22/2021    CREATININE 0.7 11/22/2021    GLUCOSE 94 11/22/2021    CALCIUM 9.2 11/22/2021    PROT 7.0 11/22/2021    LABALBU 4.3 11/22/2021    BILITOT 0.4 11/22/2021    ALKPHOS 102 11/22/2021    AST 15 11/22/2021    ALT <5 (L) 11/22/2021    LABGLOM 83 (A) 11/22/2021       Lab Results   Component Value Date    CHOL 159 11/22/2021    TRIG 71 11/22/2021    HDL 58 11/22/2021    LDLCALC 87 11/22/2021         Patient's complete Health Risk Assessment and screening values have been reviewed and are found in Flowsheets. The following problems were reviewed today and where indicated follow up appointments were made and/or referrals ordered.     Positive Risk Factor Screenings with Interventions:         Substance History:  Social History     Tobacco History     Smoking Status  Current Every Day Smoker Smoking Frequency  0.5 packs/day for 40 years (20 pk yrs) Smoking Tobacco Type  Cigarettes Smokeless Tobacco Use  Never Used    Tobacco Comment  9-12 cigarettes per day, trying to cut back          Alcohol History     Alcohol Use Status  Yes Comment  rarely          Drug Use     Drug Use Status  No          Sexual Activity     Sexually Active  Not Currently               Alcohol Screening: Audit-C Score: 2  Total Score: 2    A score of 8 or more is associated with harmful or hazardous drinking. A score of 13 or more in women, and 15 or more in men, is likely to indicate alcohol dependence. Substance Abuse Interventions:  · Tobacco abuse:  patient is not ready to work toward tobacco cessation at this time    [address] [facility] and ACP:  General  In general, how would you say your health is?: Good  In the past 7 days, have you experienced any of the following? New or Increased Pain, New or Increased Fatigue, Loneliness, Social Isolation, Stress or Anger?: None of These  Do you get the social and emotional support that you need?: Yes  Do you have a Living Will?: Yes  Advance Directives     Power of [facility] Will ACP-Advance Directive ACP-Power of     Not on File Not on File Not on File Not on File      General Health Risk Interventions:  · No intervention needed    Health Habits/Nutrition:  Health Habits/Nutrition  Do you exercise for at least 20 minutes 2-3 times per week?: (!) No  Have you lost any weight without trying in the past 3 months?: No  Do you eat only one meal per day?: No  Have you seen the dentist within the past year?: Yes  Body mass index: (!) 37.98  Health Habits/Nutrition Interventions:  · Inadequate physical activity:  patient agrees to increase physical activity as follows:  She will remain active with walking    Hearing/Vision:  No exam data present  Hearing/Vision  Do you or your family notice any trouble with your hearing that hasn't been managed with hearing aids?: No  Do you have difficulty driving, watching TV, or doing any of your daily activities because of your eyesight?: (!) Yes  Have you had an eye exam within the past year?: Yes  Hearing/Vision Interventions:  · Vision concerns:  patient encouraged to make appointment with his/her eye specialist    Safety:  Safety  Do you have working smoke detectors?: Yes  Have all throw rugs been removed or fastened?: (!) No  Do you have non-slip mats or surfaces in all bathtubs/showers?: (!) No  Do all of your stairways have a railing or banister?: Yes  Are your doorways, halls and stairs free of clutter?: Yes  Do you always fasten your seatbelt when you are in a car?: Yes  Safety Interventions:  · Home safety tips provided     Personalized Preventive Plan   Current Health Maintenance Status  Immunization History   Administered Date(s) Administered    COVID-19, Moderna, Booster, PF, 50mcg/0.25ml 10/27/2021    COVID-19, Moderna, Primary or Immunocompromised, PF, 100mcg/0.5mL 02/17/2021, 03/17/2021    Pneumococcal Conjugate 13-valent ([id number]) 11/29/2018    Pneumococcal Polysaccharide ([id number]) 08/01/2009, 01/03/2020    Tdap (Boostrix, Adacel) 09/26/2011        Health Maintenance   Topic Date Due    Shingles Vaccine (1 of 2) Never done    Low dose CT lung screening  Never done    DEXA (modify frequency per FRAX score)  Never done    DTaP/Tdap/Td vaccine (2 - Td or Tdap) 09/26/2021    Annual Wellness Visit (AWV)  11/19/2021    Hepatitis C screen  06/20/2024 (Originally 1955)    Flu vaccine (1) 12/01/2022 (Originally 9/1/2021)    Potassium monitoring  11/22/2022    Creatinine monitoring  11/22/2022    Breast cancer screen  09/15/2023    Colon cancer screen colonoscopy  08/01/2024    Lipid screen  11/22/2026    Pneumococcal 65+ years Vaccine  Completed    COVID-19 Vaccine  Completed    Hepatitis A vaccine  Aged Out    Hepatitis B vaccine  Aged Out    Hib vaccine  Aged Out    Meningococcal (ACWY) vaccine  Aged Out     Recommendations for Groupoff Due: see orders and patient Trina

## 2025-03-28 ENCOUNTER — TELEPHONE (OUTPATIENT)
Dept: FAMILY MEDICINE CLINIC | Age: 70
End: 2025-03-28

## 2025-03-28 DIAGNOSIS — K08.89 PAIN, DENTAL: Primary | ICD-10-CM

## 2025-03-28 RX ORDER — TRAMADOL HYDROCHLORIDE 50 MG/1
50 TABLET ORAL EVERY 8 HOURS PRN
Qty: 15 TABLET | Refills: 0 | Status: SHIPPED | OUTPATIENT
Start: 2025-03-28 | End: 2025-03-31

## 2025-03-28 NOTE — TELEPHONE ENCOUNTER
Pt called in and stated that she has a tooth ache and th dentist gave her Amoxicillin 500mg 4x a day for 7 days.   She said \" he must not know me because he didn't give me anything for pain. They can't take it out until the 8th. I dont care if it is 3-4 tablets. I just want something to get over this pain\"     Pharmacy - [facility]

## 2025-03-28 NOTE — TELEPHONE ENCOUNTER
Let pt know I sent ultram in for her for pain , meds sent to [facility], make sure she finishes the antibiotic. Call us for any changes

## 2025-05-28 ENCOUNTER — OFFICE VISIT (OUTPATIENT)
Dept: FAMILY MEDICINE CLINIC | Age: 70
End: 2025-05-28
Payer: MEDICARE

## 2025-05-28 VITALS
HEIGHT: 62 IN | TEMPERATURE: 98.3 F | OXYGEN SATURATION: 93 % | DIASTOLIC BLOOD PRESSURE: 82 MMHG | RESPIRATION RATE: 16 BRPM | WEIGHT: 217.4 LBS | BODY MASS INDEX: 40.01 KG/M2 | HEART RATE: 67 BPM | SYSTOLIC BLOOD PRESSURE: 126 MMHG

## 2025-05-28 DIAGNOSIS — R09.81 NASAL CONGESTION: ICD-10-CM

## 2025-05-28 DIAGNOSIS — H35.30 MACULAR DEGENERATION OF BOTH EYES, UNSPECIFIED TYPE: ICD-10-CM

## 2025-05-28 DIAGNOSIS — J44.9 CHRONIC OBSTRUCTIVE PULMONARY DISEASE, UNSPECIFIED COPD TYPE (HCC): ICD-10-CM

## 2025-05-28 DIAGNOSIS — Z72.0 TOBACCO ABUSE: ICD-10-CM

## 2025-05-28 DIAGNOSIS — I10 ESSENTIAL HYPERTENSION: ICD-10-CM

## 2025-05-28 DIAGNOSIS — Z87.891 PERSONAL HISTORY OF TOBACCO USE: ICD-10-CM

## 2025-05-28 DIAGNOSIS — Z00.01 ENCOUNTER FOR MEDICARE ANNUAL EXAMINATION WITH ABNORMAL FINDINGS: Primary | ICD-10-CM

## 2025-05-28 PROCEDURE — 1123F ACP DISCUSS/DSCN MKR DOCD: CPT | Performed by: NURSE PRACTITIONER

## 2025-05-28 PROCEDURE — 1160F RVW MEDS BY RX/DR IN RCRD: CPT | Performed by: NURSE PRACTITIONER

## 2025-05-28 PROCEDURE — 99214 OFFICE O/P EST MOD 30 MIN: CPT | Performed by: NURSE PRACTITIONER

## 2025-05-28 PROCEDURE — 1159F MED LIST DOCD IN RCRD: CPT | Performed by: NURSE PRACTITIONER

## 2025-05-28 PROCEDURE — 3074F SYST BP LT 130 MM HG: CPT | Performed by: NURSE PRACTITIONER

## 2025-05-28 PROCEDURE — G0296 VISIT TO DETERM LDCT ELIG: HCPCS | Performed by: NURSE PRACTITIONER

## 2025-05-28 PROCEDURE — G0439 PPPS, SUBSEQ VISIT: HCPCS | Performed by: NURSE PRACTITIONER

## 2025-05-28 PROCEDURE — 3079F DIAST BP 80-89 MM HG: CPT | Performed by: NURSE PRACTITIONER

## 2025-05-28 RX ORDER — ALBUTEROL SULFATE 90 UG/1
INHALANT RESPIRATORY (INHALATION)
Qty: 25.5 G | Refills: 1 | Status: SHIPPED | OUTPATIENT
Start: 2025-05-28

## 2025-05-28 RX ORDER — FLUTICASONE FUROATE AND VILANTEROL 100; 25 UG/1; UG/1
1 POWDER RESPIRATORY (INHALATION) DAILY
Qty: 1 EACH | Refills: 3 | Status: SHIPPED | OUTPATIENT
Start: 2025-05-28

## 2025-05-28 RX ORDER — MONTELUKAST SODIUM 10 MG/1
10 TABLET ORAL DAILY
Qty: 90 TABLET | Refills: 3 | Status: SHIPPED | OUTPATIENT
Start: 2025-05-28

## 2025-05-28 SDOH — ECONOMIC STABILITY: FOOD INSECURITY: WITHIN THE PAST 12 MONTHS, YOU WORRIED THAT YOUR FOOD WOULD RUN OUT BEFORE YOU GOT MONEY TO BUY MORE.: NEVER TRUE

## 2025-05-28 SDOH — ECONOMIC STABILITY: FOOD INSECURITY: WITHIN THE PAST 12 MONTHS, THE FOOD YOU BOUGHT JUST DIDN'T LAST AND YOU DIDN'T HAVE MONEY TO GET MORE.: NEVER TRUE

## 2025-05-28 ASSESSMENT — PATIENT HEALTH QUESTIONNAIRE - PHQ9
SUM OF ALL RESPONSES TO PHQ QUESTIONS 1-9: 1
2. FEELING DOWN, DEPRESSED OR HOPELESS: SEVERAL DAYS
SUM OF ALL RESPONSES TO PHQ QUESTIONS 1-9: 1
SUM OF ALL RESPONSES TO PHQ QUESTIONS 1-9: 1
1. LITTLE INTEREST OR PLEASURE IN DOING THINGS: NOT AT ALL
SUM OF ALL RESPONSES TO PHQ QUESTIONS 1-9: 1

## 2025-05-28 ASSESSMENT — LIFESTYLE VARIABLES
HOW MANY STANDARD DRINKS CONTAINING ALCOHOL DO YOU HAVE ON A TYPICAL DAY: 1 OR 2
HOW OFTEN DO YOU HAVE A DRINK CONTAINING ALCOHOL: MONTHLY OR LESS

## 2025-05-28 NOTE — PROGRESS NOTES
Medicare Annual Wellness Visit    Zoey Mcdaniel is here for Medicare AWV (Wants singular back)    Assessment & Plan   Encounter for Medicare annual examination with abnormal findings  BMI 38.0-38.9,adult  Decrease calories 1500 a day low carb diet   -     CBC with Auto Differential; Future  -     Comprehensive Metabolic Panel; Future  -     Lipid Panel; Future  -     TSH reflex to FT4; Future  Personal history of tobacco use  -     AR VISIT TO DISCUSS LUNG CA SCREEN W LDCT  Tobacco abuse  Declines tobacco cessation   -     CT LUNG CANCER SCREENING (INITIAL/ANNUAL); Future  Essential hypertension  At goal continue current meds  -     CBC with Auto Differential; Future  -     Comprehensive Metabolic Panel; Future  -     Lipid Panel; Future  -     TSH reflex to FT4; Future  Chronic obstructive pulmonary disease, unspecified COPD type (HCC)  Not at goal likely needs LAMA/LABA also discussed sprivia pt declines  has interactions to multiple inhalers  Offered pulmonary referral and pt declines  Worsening symptoms   -     CBC with Auto Differential; Future  -     Comprehensive Metabolic Panel; Future  -     Lipid Panel; Future  -     TSH reflex to FT4; Future  -     montelukast (SINGULAIR) 10 MG tablet; Take 1 tablet by mouth daily, Disp-90 tablet, R-3Normal  -     fluticasone furoate-vilanterol (BREO ELLIPTA) 100-25 MCG/ACT inhaler; Inhale 1 puff into the lungs daily, Disp-1 each, R-3Normal  -     albuterol sulfate HFA (PROVENTIL;VENTOLIN;PROAIR) 108 (90 Base) MCG/ACT inhaler; INHALE 2 PUFFS BY MOUTH EVERY 4 HOURS AS NEEDED FOR COUGH, Disp-25.5 g, R-1Normal  Macular degeneration of both eyes, unspecified type  Nasal congestion  Offered ENT referral pt declines  Trial singulair   Likely chronic sinus concerns or allergies.      Return in about 6 months (around 11/28/2025) for med refill.     Subjective   The following acute and/or chronic problems were also addressed today:    HTN  -controlled with lisinopril 10 mg

## 2025-06-02 ENCOUNTER — TELEPHONE (OUTPATIENT)
Dept: FAMILY MEDICINE CLINIC | Age: 70
End: 2025-06-02

## 2025-06-02 DIAGNOSIS — J42 CHRONIC BRONCHITIS, UNSPECIFIED CHRONIC BRONCHITIS TYPE (HCC): Primary | ICD-10-CM

## 2025-06-02 NOTE — TELEPHONE ENCOUNTER
I did order breo for her at her recent appt as an inhaler that works well for COPD, did she pick this up from her pharmacy? Did her insurance cover it? It may work better than the  breo but I did send qvar, (she takes one or the other not both)  in in terms of her leg cramps could be due to electrolyte deficiency I did give her orders to check this at her visit did she get labs done yet? She can perform daily leg pump exercise  and frequent ambulation to prevent and drink electrolyte solutions thanks

## 2025-06-02 NOTE — TELEPHONE ENCOUNTER
Pt informed of Qvar sent to the pharmacy.   It is the Breo that she did . I did not state that prior and I apologize. She has not done the labs yet, she has been busy and is unable to get them for a couple more days. She states she uses her legs a lot already when she is cleaning and moving around, but will try the leg pump exercises as well. She does not like Gatorade but will try to drink something with electrolytes in it.

## 2025-06-02 NOTE — TELEPHONE ENCOUNTER
Pt called and is asking to  be put back on her Qvar.  On Saturday she was getting leg cramps and she wasn't able to get her O2 above 91%     On Sunday she wasn't able to get her O2 past 91% either.    If she uses her albuterol the O2 will go above 95%    Legs are cramping to where she can't more and she doesn't want to get down to pick something up or clean something and not be able to get back up with her legs cramping and locking on her.       Today her O2 is at 92% without the Breo and just taking the Albuterol.

## 2025-06-04 ENCOUNTER — RESULTS FOLLOW-UP (OUTPATIENT)
Dept: FAMILY MEDICINE CLINIC | Age: 70
End: 2025-06-04

## 2025-06-04 ENCOUNTER — LAB (OUTPATIENT)
Dept: LAB | Age: 70
End: 2025-06-04

## 2025-06-04 DIAGNOSIS — I10 ESSENTIAL HYPERTENSION: ICD-10-CM

## 2025-06-04 DIAGNOSIS — J44.9 CHRONIC OBSTRUCTIVE PULMONARY DISEASE, UNSPECIFIED COPD TYPE (HCC): ICD-10-CM

## 2025-06-04 LAB
ALBUMIN SERPL BCG-MCNC: 3.9 G/DL (ref 3.4–4.9)
ALP SERPL-CCNC: 86 U/L (ref 38–126)
ALT SERPL W/O P-5'-P-CCNC: < 5 U/L (ref 10–35)
ANION GAP SERPL CALC-SCNC: 11 MEQ/L (ref 8–16)
AST SERPL-CCNC: 23 U/L (ref 10–35)
BASOPHILS ABSOLUTE: 0.1 THOU/MM3 (ref 0–0.1)
BASOPHILS NFR BLD AUTO: 0.8 %
BILIRUB SERPL-MCNC: 0.4 MG/DL (ref 0.3–1.2)
BUN SERPL-MCNC: 11 MG/DL (ref 8–23)
CALCIUM SERPL-MCNC: 9 MG/DL (ref 8.8–10.2)
CHLORIDE SERPL-SCNC: 100 MEQ/L (ref 98–111)
CHOLEST SERPL-MCNC: 130 MG/DL (ref 100–199)
CO2 SERPL-SCNC: 29 MEQ/L (ref 22–29)
CREAT SERPL-MCNC: 0.6 MG/DL (ref 0.5–0.9)
DEPRECATED RDW RBC AUTO: 49.7 FL (ref 35–45)
EOSINOPHIL NFR BLD AUTO: 3.3 %
EOSINOPHILS ABSOLUTE: 0.2 THOU/MM3 (ref 0–0.4)
ERYTHROCYTE [DISTWIDTH] IN BLOOD BY AUTOMATED COUNT: 14.1 % (ref 11.5–14.5)
GFR SERPL CREATININE-BSD FRML MDRD: > 90 ML/MIN/1.73M2
GLUCOSE SERPL-MCNC: 86 MG/DL (ref 74–109)
HCT VFR BLD AUTO: 48.9 % (ref 37–47)
HDLC SERPL-MCNC: 59 MG/DL
HGB BLD-MCNC: 14.8 GM/DL (ref 12–16)
IMM GRANULOCYTES # BLD AUTO: 0.02 THOU/MM3 (ref 0–0.07)
IMM GRANULOCYTES NFR BLD AUTO: 0.3 %
LDLC SERPL CALC-MCNC: 59 MG/DL
LYMPHOCYTES ABSOLUTE: 1.4 THOU/MM3 (ref 1–4.8)
LYMPHOCYTES NFR BLD AUTO: 18.2 %
MCH RBC QN AUTO: 29.1 PG (ref 26–33)
MCHC RBC AUTO-ENTMCNC: 30.3 GM/DL (ref 32.2–35.5)
MCV RBC AUTO: 96.3 FL (ref 81–99)
MONOCYTES ABSOLUTE: 0.6 THOU/MM3 (ref 0.4–1.3)
MONOCYTES NFR BLD AUTO: 7.7 %
NEUTROPHILS ABSOLUTE: 5.2 THOU/MM3 (ref 1.8–7.7)
NEUTROPHILS NFR BLD AUTO: 69.7 %
NRBC BLD AUTO-RTO: 0 /100 WBC
PLATELET # BLD AUTO: 267 THOU/MM3 (ref 130–400)
PMV BLD AUTO: 10.9 FL (ref 9.4–12.4)
POTASSIUM SERPL-SCNC: 4.4 MEQ/L (ref 3.5–5.2)
PROT SERPL-MCNC: 6.6 G/DL (ref 6.4–8.3)
RBC # BLD AUTO: 5.08 MILL/MM3 (ref 4.2–5.4)
SODIUM SERPL-SCNC: 140 MEQ/L (ref 135–145)
TRIGL SERPL-MCNC: 59 MG/DL (ref 0–199)
TSH SERPL DL<=0.05 MIU/L-ACNC: 1.36 UIU/ML (ref 0.27–4.2)
WBC # BLD AUTO: 7.5 THOU/MM3 (ref 4.8–10.8)

## 2025-08-16 DIAGNOSIS — M17.0 PRIMARY OSTEOARTHRITIS OF BOTH KNEES: ICD-10-CM

## 2025-08-18 RX ORDER — NAPROXEN 250 MG/1
250 TABLET ORAL 2 TIMES DAILY PRN
Qty: 180 TABLET | Refills: 3 | Status: SHIPPED | OUTPATIENT
Start: 2025-08-18

## 2025-08-19 ENCOUNTER — OFFICE VISIT (OUTPATIENT)
Dept: FAMILY MEDICINE CLINIC | Age: 70
End: 2025-08-19

## 2025-08-19 VITALS
SYSTOLIC BLOOD PRESSURE: 124 MMHG | DIASTOLIC BLOOD PRESSURE: 64 MMHG | OXYGEN SATURATION: 93 % | WEIGHT: 219.6 LBS | TEMPERATURE: 97.8 F | HEART RATE: 68 BPM | RESPIRATION RATE: 20 BRPM | HEIGHT: 62 IN | BODY MASS INDEX: 40.41 KG/M2

## 2025-08-19 DIAGNOSIS — R06.02 SOB (SHORTNESS OF BREATH) ON EXERTION: ICD-10-CM

## 2025-08-19 DIAGNOSIS — J44.9 CHRONIC OBSTRUCTIVE PULMONARY DISEASE, UNSPECIFIED COPD TYPE (HCC): Primary | ICD-10-CM

## 2025-08-19 DIAGNOSIS — R06.2 WHEEZING: ICD-10-CM

## 2025-08-19 DIAGNOSIS — T88.7XXA MEDICATION SIDE EFFECTS: ICD-10-CM

## 2025-08-19 RX ORDER — PREDNISONE 20 MG/1
TABLET ORAL
Qty: 18 TABLET | Refills: 0 | Status: SHIPPED | OUTPATIENT
Start: 2025-08-19

## 2025-08-19 ASSESSMENT — ENCOUNTER SYMPTOMS
CHEST TIGHTNESS: 1
CHOKING: 0
SHORTNESS OF BREATH: 1
APNEA: 0
COUGH: 1
WHEEZING: 1

## 2025-08-20 ENCOUNTER — TELEPHONE (OUTPATIENT)
Dept: FAMILY MEDICINE CLINIC | Age: 70
End: 2025-08-20

## 2025-08-20 RX ORDER — AZITHROMYCIN 250 MG/1
TABLET, FILM COATED ORAL
Qty: 6 TABLET | Refills: 0 | Status: SHIPPED | OUTPATIENT
Start: 2025-08-20 | End: 2025-08-30

## 2025-09-02 ENCOUNTER — TELEPHONE (OUTPATIENT)
Dept: FAMILY MEDICINE CLINIC | Age: 70
End: 2025-09-02

## (undated) DEVICE — DRAPE,EXTREMITY,89X128,STERILE: Brand: MEDLINE

## (undated) DEVICE — COTTON BALL ST

## (undated) DEVICE — PACK PROCEDURE SURG PLAS SC MIN SRHP LF

## (undated) DEVICE — DRAPE,U/SHT,SPLIT,FILM,60X84,STERILE: Brand: MEDLINE

## (undated) DEVICE — GOWN,SIRUS,NON REINFRCD,LARGE,SET IN SL: Brand: MEDLINE

## (undated) DEVICE — SUTURE ETHLN SZ 4-0 L18IN NONABSORBABLE BLK L16MM PS-4 1/2 1662G

## (undated) DEVICE — GARMENT,MEDLINE,DVT,INT,CALF,MED, GEN2: Brand: MEDLINE

## (undated) DEVICE — CHLORAPREP 26ML CLEAR

## (undated) DEVICE — GLOVE SURG SZ 8 L11.77IN FNGR THK9.8MIL STRW LTX POLYMER

## (undated) DEVICE — GLOVE ORANGE PI 7   MSG9070